# Patient Record
Sex: MALE | Race: WHITE | Employment: UNEMPLOYED | URBAN - METROPOLITAN AREA
[De-identification: names, ages, dates, MRNs, and addresses within clinical notes are randomized per-mention and may not be internally consistent; named-entity substitution may affect disease eponyms.]

---

## 2019-08-23 ENCOUNTER — HOSPITAL ENCOUNTER (INPATIENT)
Age: 54
LOS: 12 days | Discharge: HOME HEALTH | DRG: 854 | End: 2019-09-04
Attending: EMERGENCY MEDICINE | Admitting: HOSPITALIST

## 2019-08-23 ENCOUNTER — APPOINTMENT (OUTPATIENT)
Dept: GENERAL RADIOLOGY | Age: 54
DRG: 854 | End: 2019-08-23
Attending: EMERGENCY MEDICINE

## 2019-08-23 DIAGNOSIS — L03.115 CELLULITIS OF RIGHT FOOT: Primary | ICD-10-CM

## 2019-08-23 DIAGNOSIS — S90.851A FOREIGN BODY IN RIGHT FOOT, INITIAL ENCOUNTER: ICD-10-CM

## 2019-08-23 PROBLEM — Z91.199 NON-COMPLIANCE: Status: ACTIVE | Noted: 2019-08-23

## 2019-08-23 PROBLEM — M79.5 FOREIGN BODY (FB) IN SOFT TISSUE: Status: ACTIVE | Noted: 2019-08-23

## 2019-08-23 PROBLEM — T79.7XXA SUBCUTANEOUS EMPHYSEMA (HCC): Status: ACTIVE | Noted: 2019-08-23

## 2019-08-23 PROBLEM — E87.1 HYPONATREMIA: Status: ACTIVE | Noted: 2019-08-23

## 2019-08-23 LAB
ALBUMIN SERPL-MCNC: 3.4 G/DL (ref 3.5–5)
ALBUMIN/GLOB SERPL: 0.8 {RATIO} (ref 1.2–3.5)
ALP SERPL-CCNC: 129 U/L (ref 50–136)
ALT SERPL-CCNC: 14 U/L (ref 12–65)
ANION GAP SERPL CALC-SCNC: 7 MMOL/L (ref 7–16)
AST SERPL-CCNC: 10 U/L (ref 15–37)
BASOPHILS # BLD: 0.1 K/UL (ref 0–0.2)
BASOPHILS NFR BLD: 0 % (ref 0–2)
BILIRUB SERPL-MCNC: 1.5 MG/DL (ref 0.2–1.1)
BUN SERPL-MCNC: 9 MG/DL (ref 6–23)
CALCIUM SERPL-MCNC: 9.2 MG/DL (ref 8.3–10.4)
CHLORIDE SERPL-SCNC: 90 MMOL/L (ref 98–107)
CO2 SERPL-SCNC: 29 MMOL/L (ref 21–32)
CREAT SERPL-MCNC: 1.21 MG/DL (ref 0.8–1.5)
DIFFERENTIAL METHOD BLD: ABNORMAL
EOSINOPHIL # BLD: 0 K/UL (ref 0–0.8)
EOSINOPHIL NFR BLD: 0 % (ref 0.5–7.8)
ERYTHROCYTE [DISTWIDTH] IN BLOOD BY AUTOMATED COUNT: 12.6 % (ref 11.9–14.6)
EST. AVERAGE GLUCOSE BLD GHB EST-MCNC: 232 MG/DL
GLOBULIN SER CALC-MCNC: 4.4 G/DL (ref 2.3–3.5)
GLUCOSE BLD STRIP.AUTO-MCNC: 252 MG/DL (ref 65–100)
GLUCOSE BLD STRIP.AUTO-MCNC: 315 MG/DL (ref 65–100)
GLUCOSE SERPL-MCNC: 337 MG/DL (ref 65–100)
HBA1C MFR BLD: 9.7 %
HCT VFR BLD AUTO: 42 % (ref 41.1–50.3)
HGB BLD-MCNC: 14.4 G/DL (ref 13.6–17.2)
IMM GRANULOCYTES # BLD AUTO: 0.1 K/UL (ref 0–0.5)
IMM GRANULOCYTES NFR BLD AUTO: 1 % (ref 0–5)
LACTATE BLD-SCNC: 2.02 MMOL/L (ref 0.5–1.9)
LYMPHOCYTES # BLD: 1.2 K/UL (ref 0.5–4.6)
LYMPHOCYTES NFR BLD: 6 % (ref 13–44)
MCH RBC QN AUTO: 28.6 PG (ref 26.1–32.9)
MCHC RBC AUTO-ENTMCNC: 34.3 G/DL (ref 31.4–35)
MCV RBC AUTO: 83.5 FL (ref 79.6–97.8)
MONOCYTES # BLD: 1.2 K/UL (ref 0.1–1.3)
MONOCYTES NFR BLD: 6 % (ref 4–12)
NEUTS SEG # BLD: 17 K/UL (ref 1.7–8.2)
NEUTS SEG NFR BLD: 87 % (ref 43–78)
NRBC # BLD: 0 K/UL (ref 0–0.2)
PLATELET # BLD AUTO: 206 K/UL (ref 150–450)
PMV BLD AUTO: 11 FL (ref 9.4–12.3)
POTASSIUM SERPL-SCNC: 3.7 MMOL/L (ref 3.5–5.1)
PROT SERPL-MCNC: 7.8 G/DL (ref 6.3–8.2)
RBC # BLD AUTO: 5.03 M/UL (ref 4.23–5.6)
SODIUM SERPL-SCNC: 126 MMOL/L (ref 136–145)
URATE SERPL-MCNC: 3.8 MG/DL (ref 2.6–6)
WBC # BLD AUTO: 19.5 K/UL (ref 4.3–11.1)

## 2019-08-23 PROCEDURE — 87150 DNA/RNA AMPLIFIED PROBE: CPT

## 2019-08-23 PROCEDURE — 83605 ASSAY OF LACTIC ACID: CPT

## 2019-08-23 PROCEDURE — 87040 BLOOD CULTURE FOR BACTERIA: CPT

## 2019-08-23 PROCEDURE — 74011250636 HC RX REV CODE- 250/636: Performed by: HOSPITALIST

## 2019-08-23 PROCEDURE — 80053 COMPREHEN METABOLIC PANEL: CPT

## 2019-08-23 PROCEDURE — 87186 SC STD MICRODIL/AGAR DIL: CPT

## 2019-08-23 PROCEDURE — 87205 SMEAR GRAM STAIN: CPT

## 2019-08-23 PROCEDURE — 74011000258 HC RX REV CODE- 258: Performed by: EMERGENCY MEDICINE

## 2019-08-23 PROCEDURE — 99284 EMERGENCY DEPT VISIT MOD MDM: CPT | Performed by: EMERGENCY MEDICINE

## 2019-08-23 PROCEDURE — 87077 CULTURE AEROBIC IDENTIFY: CPT

## 2019-08-23 PROCEDURE — 84550 ASSAY OF BLOOD/URIC ACID: CPT

## 2019-08-23 PROCEDURE — 74011250636 HC RX REV CODE- 250/636: Performed by: EMERGENCY MEDICINE

## 2019-08-23 PROCEDURE — 85025 COMPLETE CBC W/AUTO DIFF WBC: CPT

## 2019-08-23 PROCEDURE — 65270000029 HC RM PRIVATE

## 2019-08-23 PROCEDURE — 83036 HEMOGLOBIN GLYCOSYLATED A1C: CPT

## 2019-08-23 PROCEDURE — 74011636637 HC RX REV CODE- 636/637: Performed by: HOSPITALIST

## 2019-08-23 PROCEDURE — 73630 X-RAY EXAM OF FOOT: CPT

## 2019-08-23 PROCEDURE — 74011250637 HC RX REV CODE- 250/637: Performed by: HOSPITALIST

## 2019-08-23 PROCEDURE — 96365 THER/PROPH/DIAG IV INF INIT: CPT | Performed by: EMERGENCY MEDICINE

## 2019-08-23 PROCEDURE — 82962 GLUCOSE BLOOD TEST: CPT

## 2019-08-23 RX ORDER — ADHESIVE BANDAGE
30 BANDAGE TOPICAL DAILY PRN
Status: DISCONTINUED | OUTPATIENT
Start: 2019-08-23 | End: 2019-09-04 | Stop reason: HOSPADM

## 2019-08-23 RX ORDER — NALOXONE HYDROCHLORIDE 0.4 MG/ML
0.4 INJECTION, SOLUTION INTRAMUSCULAR; INTRAVENOUS; SUBCUTANEOUS AS NEEDED
Status: DISCONTINUED | OUTPATIENT
Start: 2019-08-23 | End: 2019-09-04 | Stop reason: HOSPADM

## 2019-08-23 RX ORDER — PROMETHAZINE HYDROCHLORIDE 25 MG/1
25 TABLET ORAL
Status: DISCONTINUED | OUTPATIENT
Start: 2019-08-23 | End: 2019-09-04 | Stop reason: HOSPADM

## 2019-08-23 RX ORDER — ACETAMINOPHEN 325 MG/1
650 TABLET ORAL
Status: DISCONTINUED | OUTPATIENT
Start: 2019-08-23 | End: 2019-09-04 | Stop reason: HOSPADM

## 2019-08-23 RX ORDER — VANCOMYCIN 2 GRAM/500 ML IN 0.9 % SODIUM CHLORIDE INTRAVENOUS
2000 ONCE
Status: COMPLETED | OUTPATIENT
Start: 2019-08-23 | End: 2019-08-24

## 2019-08-23 RX ORDER — SODIUM CHLORIDE 0.9 % (FLUSH) 0.9 %
5-40 SYRINGE (ML) INJECTION AS NEEDED
Status: DISCONTINUED | OUTPATIENT
Start: 2019-08-23 | End: 2019-09-03 | Stop reason: SDUPTHER

## 2019-08-23 RX ORDER — ACETAMINOPHEN 650 MG/1
650 SUPPOSITORY RECTAL
Status: DISCONTINUED | OUTPATIENT
Start: 2019-08-23 | End: 2019-08-23

## 2019-08-23 RX ORDER — SODIUM CHLORIDE 9 MG/ML
100 INJECTION, SOLUTION INTRAVENOUS CONTINUOUS
Status: DISPENSED | OUTPATIENT
Start: 2019-08-23 | End: 2019-08-25

## 2019-08-23 RX ORDER — INSULIN LISPRO 100 [IU]/ML
INJECTION, SOLUTION INTRAVENOUS; SUBCUTANEOUS
Status: DISCONTINUED | OUTPATIENT
Start: 2019-08-23 | End: 2019-09-04 | Stop reason: HOSPADM

## 2019-08-23 RX ORDER — SODIUM CHLORIDE 0.9 % (FLUSH) 0.9 %
5-40 SYRINGE (ML) INJECTION EVERY 8 HOURS
Status: DISCONTINUED | OUTPATIENT
Start: 2019-08-23 | End: 2019-09-03 | Stop reason: SDUPTHER

## 2019-08-23 RX ORDER — MORPHINE SULFATE 2 MG/ML
2 INJECTION, SOLUTION INTRAMUSCULAR; INTRAVENOUS
Status: DISCONTINUED | OUTPATIENT
Start: 2019-08-23 | End: 2019-09-04 | Stop reason: HOSPADM

## 2019-08-23 RX ORDER — DIPHENHYDRAMINE HCL 25 MG
25 CAPSULE ORAL
Status: DISCONTINUED | OUTPATIENT
Start: 2019-08-23 | End: 2019-09-04 | Stop reason: HOSPADM

## 2019-08-23 RX ORDER — VANCOMYCIN/0.9 % SOD CHLORIDE 1.5G/250ML
1500 PLASTIC BAG, INJECTION (ML) INTRAVENOUS EVERY 24 HOURS
Status: DISCONTINUED | OUTPATIENT
Start: 2019-08-23 | End: 2019-08-23 | Stop reason: SDUPTHER

## 2019-08-23 RX ORDER — ENOXAPARIN SODIUM 100 MG/ML
40 INJECTION SUBCUTANEOUS EVERY 24 HOURS
Status: DISCONTINUED | OUTPATIENT
Start: 2019-08-24 | End: 2019-09-04 | Stop reason: HOSPADM

## 2019-08-23 RX ORDER — IBUPROFEN 200 MG
1 TABLET ORAL
Status: DISCONTINUED | OUTPATIENT
Start: 2019-08-23 | End: 2019-09-04 | Stop reason: HOSPADM

## 2019-08-23 RX ORDER — HYDROCODONE BITARTRATE AND ACETAMINOPHEN 7.5; 325 MG/1; MG/1
1 TABLET ORAL
Status: DISCONTINUED | OUTPATIENT
Start: 2019-08-23 | End: 2019-09-04 | Stop reason: HOSPADM

## 2019-08-23 RX ORDER — VANCOMYCIN/0.9 % SOD CHLORIDE 1.5G/250ML
1500 PLASTIC BAG, INJECTION (ML) INTRAVENOUS EVERY 12 HOURS
Status: DISCONTINUED | OUTPATIENT
Start: 2019-08-24 | End: 2019-08-25 | Stop reason: DRUGHIGH

## 2019-08-23 RX ADMIN — INSULIN LISPRO 5 UNITS: 100 INJECTION, SOLUTION INTRAVENOUS; SUBCUTANEOUS at 22:56

## 2019-08-23 RX ADMIN — SODIUM CHLORIDE 75 ML/HR: 900 INJECTION, SOLUTION INTRAVENOUS at 22:21

## 2019-08-23 RX ADMIN — SODIUM CHLORIDE 1000 ML: 900 INJECTION, SOLUTION INTRAVENOUS at 19:57

## 2019-08-23 RX ADMIN — VANCOMYCIN HYDROCHLORIDE 2000 MG: 10 INJECTION, POWDER, LYOPHILIZED, FOR SOLUTION INTRAVENOUS at 22:22

## 2019-08-23 RX ADMIN — PIPERACILLIN SODIUM,TAZOBACTAM SODIUM 3.38 G: 3; .375 INJECTION, POWDER, FOR SOLUTION INTRAVENOUS at 18:41

## 2019-08-23 RX ADMIN — Medication 5 ML: at 22:23

## 2019-08-23 RX ADMIN — ACETAMINOPHEN 650 MG: 325 TABLET, FILM COATED ORAL at 22:56

## 2019-08-23 NOTE — ED NOTES
Cleaned right foot with dermal cleanser. Pt tolerated well. Antibiotic hanging. Call bell provided. Pt resting.

## 2019-08-23 NOTE — ED TRIAGE NOTES
Pt to ED c/o redness/swelling/possible gout flare of right foot. Last flare in 2002. Pt states he cannot afford his diabetes medications. bgl 315. Afebrile.

## 2019-08-23 NOTE — ED PROVIDER NOTES
60-year-old  male with a history of diabetes presents to the emergency department complaining of right foot pain and swelling    The history is provided by the patient. Foot Pain    This is a new problem. The current episode started more than 2 days ago. The problem occurs constantly. The problem has been gradually worsening. The pain is present in the right foot. The quality of the pain is described as aching. The pain is at a severity of 6/10. Associated symptoms include stiffness. Pertinent negatives include no numbness, full range of motion, no tingling, no itching, no back pain and no neck pain. The symptoms are aggravated by palpation, movement and standing. He has tried rest for the symptoms. The treatment provided no relief. There has been no history of extremity trauma. Past Medical History:   Diagnosis Date    Diabetes [facility])        Past Surgical History:   Procedure Laterality Date    HX CHOLECYSTECTOMY           History reviewed. No pertinent family history.     Social History     Socioeconomic History    Marital status: SINGLE     Spouse name: Not on file    Number of children: Not on file    Years of education: Not on file    Highest education level: Not on file   Occupational History    Not on file   Social Needs    Financial resource strain: Not on file    Food insecurity:     Worry: Not on file     Inability: Not on file    Transportation needs:     Medical: Not on file     Non-medical: Not on file   Tobacco Use    Smoking status: Current Every Day Smoker   Substance and Sexual Activity    Alcohol use: No    Drug use: Not on file    Sexual activity: Never   Lifestyle    Physical activity:     Days per week: Not on file     Minutes per session: Not on file    Stress: Not on file   Relationships    Social connections:     Talks on phone: Not on file     Gets together: Not on file     Attends Advent service: Not on file     Active member of club or organization: Not on file     Attends meetings of clubs or organizations: Not on file     Relationship status: Not on file    Intimate partner violence:     Fear of current or ex partner: Not on file     Emotionally abused: Not on file     Physically abused: Not on file     Forced sexual activity: Not on file   Other Topics Concern    Not on file   Social History Narrative    Not on file         ALLERGIES: Patient has no known allergies. Review of Systems   Constitutional: Negative for chills and fever. Musculoskeletal: Positive for arthralgias and stiffness. Negative for back pain and neck pain. Skin: Positive for color change. Negative for itching. Neurological: Negative for tingling and numbness. All other systems reviewed and are negative. Vitals:    08/23/19 1738   BP: 123/85   Pulse: 94   Resp: 16   Temp: 98.7 °F (37.1 °C)   SpO2: 98%   Weight: 95.3 kg (210 lb)   Height: 5' 9\" (1.753 m)            Physical Exam   Constitutional: He is oriented to person, place, and time. He appears well-developed and well-nourished. No distress. HENT:   Head: Normocephalic and atraumatic. Right Ear: External ear normal.   Left Ear: External ear normal.   Mouth/Throat: Oropharynx is clear and moist.   Eyes: Pupils are equal, round, and reactive to light. Conjunctivae and EOM are normal.   Neck: Normal range of motion. Neck supple. Cardiovascular: Normal rate, regular rhythm, normal heart sounds and intact distal pulses. Exam reveals no gallop and no friction rub. No murmur heard. Pulmonary/Chest: Effort normal and breath sounds normal.   Abdominal: Soft. Bowel sounds are normal. There is no tenderness. Musculoskeletal: Normal range of motion. He exhibits no edema. Right foot: There is tenderness, swelling and laceration. There is no deformity. Feet:    Right foot is swollen and erythematous and warm with streaking erythema going up the anterior aspect of the leg. 3+ edema to the right ankle and foot. Feet:   Right Foot:   Skin Integrity: Positive for erythema and warmth. Neurological: He is alert and oriented to person, place, and time. Skin: Skin is warm and dry. Capillary refill takes less than 2 seconds. There is erythema. Psychiatric: He has a normal mood and affect. Nursing note and vitals reviewed. MDM  Number of Diagnoses or Management Options  Cellulitis of right foot: new and requires workup  Foreign body in right foot, initial encounter: new and requires workup     Amount and/or Complexity of Data Reviewed  Clinical lab tests: ordered and reviewed  Tests in the radiology section of CPT®: ordered and reviewed  Review and summarize past medical records: yes  Discuss the patient with other providers: yes  Independent visualization of images, tracings, or specimens: yes    Risk of Complications, Morbidity, and/or Mortality  Presenting problems: high  Diagnostic procedures: moderate  Management options: moderate    Patient Progress  Patient progress: stable         Procedures    The patient was observed in the ED. he was given Zosyn 4.5 g IV for his foot infection. Due to the elevated blood sugars, history of noncompliance, severe swelling as well as possible foreign body in the foot, case was discussed with the hospitalist will admit.     Results Reviewed:      Recent Results (from the past 24 hour(s))   GLUCOSE, POC    Collection Time: 08/23/19  5:41 PM   Result Value Ref Range    Glucose (POC) 315 (H) 65 - 100 mg/dL   CBC WITH AUTOMATED DIFF    Collection Time: 08/23/19  5:54 PM   Result Value Ref Range    WBC 19.5 (H) 4.3 - 11.1 K/uL    RBC 5.03 4.23 - 5.6 M/uL    HGB 14.4 13.6 - 17.2 g/dL    HCT 42.0 41.1 - 50.3 %    MCV 83.5 79.6 - 97.8 FL    MCH 28.6 26.1 - 32.9 PG    MCHC 34.3 31.4 - 35.0 g/dL    RDW 12.6 11.9 - 14.6 %    PLATELET 463 490 - 993 K/uL    MPV 11.0 9.4 - 12.3 FL    ABSOLUTE NRBC 0.00 0.0 - 0.2 K/uL    DF AUTOMATED      NEUTROPHILS 87 (H) 43 - 78 %    LYMPHOCYTES 6 (L) 13 - 44 %    MONOCYTES 6 4.0 - 12.0 %    EOSINOPHILS 0 (L) 0.5 - 7.8 %    BASOPHILS 0 0.0 - 2.0 %    IMMATURE GRANULOCYTES 1 0.0 - 5.0 %    ABS. NEUTROPHILS 17.0 (H) 1.7 - 8.2 K/UL    ABS. LYMPHOCYTES 1.2 0.5 - 4.6 K/UL    ABS. MONOCYTES 1.2 0.1 - 1.3 K/UL    ABS. EOSINOPHILS 0.0 0.0 - 0.8 K/UL    ABS. BASOPHILS 0.1 0.0 - 0.2 K/UL    ABS. IMM. GRANS. 0.1 0.0 - 0.5 K/UL   METABOLIC PANEL, COMPREHENSIVE    Collection Time: 08/23/19  5:54 PM   Result Value Ref Range    Sodium 126 (L) 136 - 145 mmol/L    Potassium 3.7 3.5 - 5.1 mmol/L    Chloride 90 (L) 98 - 107 mmol/L    CO2 29 21 - 32 mmol/L    Anion gap 7 7 - 16 mmol/L    Glucose 337 (H) 65 - 100 mg/dL    BUN 9 6 - 23 MG/DL    Creatinine 1.21 0.8 - 1.5 MG/DL    GFR est AA >60 >60 ml/min/1.73m2    GFR est non-AA >60 >60 ml/min/1.73m2    Calcium 9.2 8.3 - 10.4 MG/DL    Bilirubin, total 1.5 (H) 0.2 - 1.1 MG/DL    ALT (SGPT) 14 12 - 65 U/L    AST (SGOT) 10 (L) 15 - 37 U/L    Alk. phosphatase 129 50 - 136 U/L    Protein, total 7.8 6.3 - 8.2 g/dL    Albumin 3.4 (L) 3.5 - 5.0 g/dL    Globulin 4.4 (H) 2.3 - 3.5 g/dL    A-G Ratio 0.8 (L) 1.2 - 3.5     URIC ACID    Collection Time: 08/23/19  5:54 PM   Result Value Ref Range    Uric acid 3.8 2.6 - 6.0 MG/DL   POC LACTIC ACID    Collection Time: 08/23/19  6:40 PM   Result Value Ref Range    Lactic Acid (POC) 2.02 (H) 0.5 - 1.9 mmol/L       XR FOOT RT MIN 3 V   Final Result   IMPRESSION: Findings suggestive of triangular radiopaque foreign body within the   plantar soft tissues at the level of the third proximal phalanx. There is also   subcutaneous emphysema seen extending from the first through fifth digits at the   level of the proximal phalanges.

## 2019-08-23 NOTE — PROGRESS NOTES
TRANSFER - IN REPORT:    Verbal report received from Janis Veras RN on Shama Stage  being received from [facility]. for routine progression of care      Report consisted of patients Situation, Background, Assessment and   Recommendations(SBAR). Information from the following report(s) Kardex, ED Summary and Recent Results was reviewed with the receiving nurse. Opportunity for questions and clarification was provided. Assessment to be completed upon patients arrival to unit and care to be assumed.

## 2019-08-24 ENCOUNTER — ANESTHESIA EVENT (OUTPATIENT)
Dept: SURGERY | Age: 54
DRG: 854 | End: 2019-08-24

## 2019-08-24 PROBLEM — A41.9 SEPSIS (HCC): Status: ACTIVE | Noted: 2019-08-24

## 2019-08-24 PROBLEM — E11.628 DIABETIC INFECTION OF RIGHT FOOT (HCC): Status: ACTIVE | Noted: 2019-08-24

## 2019-08-24 PROBLEM — L08.9 DIABETIC INFECTION OF RIGHT FOOT (HCC): Status: ACTIVE | Noted: 2019-08-24

## 2019-08-24 LAB
ANION GAP SERPL CALC-SCNC: 7 MMOL/L (ref 7–16)
BUN SERPL-MCNC: 10 MG/DL (ref 6–23)
CALCIUM SERPL-MCNC: 7.8 MG/DL (ref 8.3–10.4)
CHLORIDE SERPL-SCNC: 96 MMOL/L (ref 98–107)
CO2 SERPL-SCNC: 28 MMOL/L (ref 21–32)
CREAT SERPL-MCNC: 1.2 MG/DL (ref 0.8–1.5)
CRP SERPL HS-MCNC: 189 MG/L
ERYTHROCYTE [DISTWIDTH] IN BLOOD BY AUTOMATED COUNT: 12.4 % (ref 11.9–14.6)
ERYTHROCYTE [SEDIMENTATION RATE] IN BLOOD: 51 MM/HR (ref 0–20)
GLUCOSE BLD STRIP.AUTO-MCNC: 156 MG/DL (ref 65–100)
GLUCOSE BLD STRIP.AUTO-MCNC: 177 MG/DL (ref 65–100)
GLUCOSE BLD STRIP.AUTO-MCNC: 182 MG/DL (ref 65–100)
GLUCOSE BLD STRIP.AUTO-MCNC: 235 MG/DL (ref 65–100)
GLUCOSE SERPL-MCNC: 174 MG/DL (ref 65–100)
HCT VFR BLD AUTO: 37.3 % (ref 41.1–50.3)
HGB BLD-MCNC: 12.7 G/DL (ref 13.6–17.2)
MCH RBC QN AUTO: 28.3 PG (ref 26.1–32.9)
MCHC RBC AUTO-ENTMCNC: 34 G/DL (ref 31.4–35)
MCV RBC AUTO: 83.3 FL (ref 79.6–97.8)
NRBC # BLD: 0 K/UL (ref 0–0.2)
PLATELET # BLD AUTO: 161 K/UL (ref 150–450)
PMV BLD AUTO: 10.5 FL (ref 9.4–12.3)
POTASSIUM SERPL-SCNC: 3.6 MMOL/L (ref 3.5–5.1)
RBC # BLD AUTO: 4.48 M/UL (ref 4.23–5.6)
SODIUM SERPL-SCNC: 131 MMOL/L (ref 136–145)
WBC # BLD AUTO: 14.2 K/UL (ref 4.3–11.1)

## 2019-08-24 PROCEDURE — 85652 RBC SED RATE AUTOMATED: CPT

## 2019-08-24 PROCEDURE — 65270000029 HC RM PRIVATE

## 2019-08-24 PROCEDURE — 85027 COMPLETE CBC AUTOMATED: CPT

## 2019-08-24 PROCEDURE — 74011000258 HC RX REV CODE- 258: Performed by: HOSPITALIST

## 2019-08-24 PROCEDURE — 74011250636 HC RX REV CODE- 250/636: Performed by: HOSPITALIST

## 2019-08-24 PROCEDURE — 36415 COLL VENOUS BLD VENIPUNCTURE: CPT

## 2019-08-24 PROCEDURE — 77030020263 HC SOL INJ SOD CL0.9% LFCR 1000ML

## 2019-08-24 PROCEDURE — 82962 GLUCOSE BLOOD TEST: CPT

## 2019-08-24 PROCEDURE — 74011250637 HC RX REV CODE- 250/637: Performed by: HOSPITALIST

## 2019-08-24 PROCEDURE — 80048 BASIC METABOLIC PNL TOTAL CA: CPT

## 2019-08-24 PROCEDURE — 74011636637 HC RX REV CODE- 636/637: Performed by: INTERNAL MEDICINE

## 2019-08-24 PROCEDURE — 86141 C-REACTIVE PROTEIN HS: CPT

## 2019-08-24 PROCEDURE — 74011636637 HC RX REV CODE- 636/637: Performed by: HOSPITALIST

## 2019-08-24 RX ORDER — POVIDONE-IODINE 10 %
SOLUTION, NON-ORAL TOPICAL DAILY
Status: DISCONTINUED | OUTPATIENT
Start: 2019-08-24 | End: 2019-08-30

## 2019-08-24 RX ORDER — INSULIN GLARGINE 100 [IU]/ML
10 INJECTION, SOLUTION SUBCUTANEOUS DAILY
Status: DISCONTINUED | OUTPATIENT
Start: 2019-08-24 | End: 2019-09-04 | Stop reason: HOSPADM

## 2019-08-24 RX ADMIN — INSULIN GLARGINE 10 UNITS: 100 INJECTION, SOLUTION SUBCUTANEOUS at 08:37

## 2019-08-24 RX ADMIN — Medication 10 ML: at 22:30

## 2019-08-24 RX ADMIN — INSULIN LISPRO 3 UNITS: 100 INJECTION, SOLUTION INTRAVENOUS; SUBCUTANEOUS at 08:38

## 2019-08-24 RX ADMIN — INSULIN LISPRO 2 UNITS: 100 INJECTION, SOLUTION INTRAVENOUS; SUBCUTANEOUS at 02:00

## 2019-08-24 RX ADMIN — ENOXAPARIN SODIUM 40 MG: 40 INJECTION SUBCUTANEOUS at 08:39

## 2019-08-24 RX ADMIN — Medication 10 ML: at 06:35

## 2019-08-24 RX ADMIN — PIPERACILLIN SODIUM,TAZOBACTAM SODIUM 3.38 G: 3; .375 INJECTION, POWDER, FOR SOLUTION INTRAVENOUS at 19:43

## 2019-08-24 RX ADMIN — SODIUM CHLORIDE 75 ML/HR: 900 INJECTION, SOLUTION INTRAVENOUS at 15:29

## 2019-08-24 RX ADMIN — INSULIN LISPRO 5 UNITS: 100 INJECTION, SOLUTION INTRAVENOUS; SUBCUTANEOUS at 12:33

## 2019-08-24 RX ADMIN — INSULIN LISPRO 3 UNITS: 100 INJECTION, SOLUTION INTRAVENOUS; SUBCUTANEOUS at 22:19

## 2019-08-24 RX ADMIN — ACETAMINOPHEN 650 MG: 325 TABLET, FILM COATED ORAL at 23:58

## 2019-08-24 RX ADMIN — VANCOMYCIN HYDROCHLORIDE 1500 MG: 10 INJECTION, POWDER, LYOPHILIZED, FOR SOLUTION INTRAVENOUS at 10:11

## 2019-08-24 RX ADMIN — PIPERACILLIN SODIUM,TAZOBACTAM SODIUM 3.38 G: 3; .375 INJECTION, POWDER, FOR SOLUTION INTRAVENOUS at 11:27

## 2019-08-24 RX ADMIN — Medication: at 19:50

## 2019-08-24 RX ADMIN — PIPERACILLIN SODIUM,TAZOBACTAM SODIUM 3.38 G: 3; .375 INJECTION, POWDER, FOR SOLUTION INTRAVENOUS at 02:59

## 2019-08-24 RX ADMIN — VANCOMYCIN HYDROCHLORIDE 1500 MG: 10 INJECTION, POWDER, LYOPHILIZED, FOR SOLUTION INTRAVENOUS at 22:01

## 2019-08-24 NOTE — CONSULTS
H&P/Consult Note/Progress Note/Office Note:   Kelly Hamilton  MRN: [id number]  :1965  Age:54 y.o.    HPI: Kelly Hamilton is a 49 y.o. male who was admitted on 19 from the ER by the Hospitalists with a diabetic right foot infection. He reported progressive, constant, severe right foot pain for at least 2 days. He walks barefoot. Nothing made pain better. It is worse with palpation or standing. He had associated low grade fever. He was placed on IV Abx by Hospitalists  He has h/o DM and non- compliance (No primary care doctor, not on DM meds)  Bld Cxs x 2 negative so far  Xray as below      19 Xray right foot  History: Redness and swelling of the right foot. Pain     EXAM: 3 views right foot     FINDINGS: There is a question of a triangular shaped foreign body along the  plantar surface of the right foot at the level of the third proximal phalanx. There is subcutaneous emphysema, raising possibility for superimposed infection. No fracture or dislocation.     IMPRESSION: Findings suggestive of triangular radiopaque foreign body within the  plantar soft tissues at the level of the third proximal phalanx.  There is also  subcutaneous emphysema seen extending from the first through fifth digits at the  level of the proximal phalanges            Past Medical History:   Diagnosis Date    Diabetes [facility])      Past Surgical History:   Procedure Laterality Date    HX CHOLECYSTECTOMY       Current Facility-Administered Medications   Medication Dose Route Frequency    piperacillin-tazobactam (ZOSYN) 3.375 g in 0.9% sodium chloride (MBP/ADV) 100 mL  3.375 g IntraVENous Q8H    insulin glargine (LANTUS) injection 10 Units  10 Units SubCUTAneous DAILY    [START ON 2019] VANCOMYCIN INFORMATION NOTE   Other ONCE    povidone-iodine (BETADINE) 10 % topical solution   Topical DAILY    0.9% sodium chloride infusion  100 mL/hr IntraVENous CONTINUOUS    sodium chloride (NS) flush 5-40 mL  5-40 mL IntraVENous Q8H    sodium chloride (NS) flush 5-40 mL  5-40 mL IntraVENous PRN    HYDROcodone-acetaminophen (NORCO) 7.5-325 mg per tablet 1 Tab  1 Tab Oral Q4H PRN    morphine injection 2 mg  2 mg IntraVENous Q4H PRN    naloxone (NARCAN) injection 0.4 mg  0.4 mg IntraVENous PRN    diphenhydrAMINE (BENADRYL) capsule 25 mg  25 mg Oral Q4H PRN    promethazine (PHENERGAN) tablet 25 mg  25 mg Oral Q6H PRN    magnesium hydroxide (MILK OF MAGNESIA) 400 mg/5 mL oral suspension 30 mL  30 mL Oral DAILY PRN    nicotine (NICODERM CQ) 21 mg/24 hr patch 1 Patch  1 Patch TransDERmal DAILY PRN    enoxaparin (LOVENOX) injection 40 mg  40 mg SubCUTAneous Q24H    insulin lispro (HUMALOG) injection   SubCUTAneous AC&HS    acetaminophen (TYLENOL) tablet 650 mg  650 mg Oral Q4H PRN    vancomycin (VANCOCIN) 1500 mg in  ml infusion  1,500 mg IntraVENous Q12H     Patient has no known allergies. Social History     Socioeconomic History    Marital status: SINGLE     Spouse name: Not on file    Number of children: Not on file    Years of education: Not on file    Highest education level: Not on file   Tobacco Use    Smoking status: Current Every Day Smoker   Substance and Sexual Activity    Alcohol use: No    Sexual activity: Never     Social History     Tobacco Use   Smoking Status Current Every Day Smoker     History reviewed. No pertinent family history. ROS: The patient has no difficulty with chest pain or shortness of breath. No fever or chills. Comprehensive review of systems was otherwise unremarkable except as noted above.     Physical Exam:   Visit Vitals  /79 (BP 1 Location: Left arm, BP Patient Position: At rest)   Pulse 82   Temp 100.1 °F (37.8 °C)   Resp 16   Ht 5' 9\" (1.753 m)   Wt 210 lb (95.3 kg)   SpO2 98%   BMI 31.01 kg/m²     Vitals:    08/24/19 0317 08/24/19 0745 08/24/19 1218 08/24/19 1557   BP: 154/81 141/78 126/73 149/79   Pulse: 89 86 87 82   Resp: 17 16 16 16   Temp: 98.6 °F (37 °C) 100.3 °F (37.9 °C) 99.4 °F (37.4 °C) 100.1 °F (37.8 °C)   SpO2: 97% 94% 97% 98%   Weight:       Height:         No intake/output data recorded. 08/23 0701 - 08/24 1900  In: 0006 [P.O.:240; I.V.:850]  Out: 1250 [Urine:1250]    Constitutional: Alert, oriented, cooperative patient in no acute distress; appears stated age    Eyes:Sclera are clear. EOMs intact  ENMT: no external lesions gross hearing normal; no obvious neck masses, no ear or lip lesions, nares normal  CV: RRR. Normal perfusion  Resp: No JVD. Breathing is  non-labored; no audible wheezing. GI: soft and non-distended     Musculoskeletal: unremarkable with normal function. No embolic signs or cyanosis. Right foot cellulitis with much necrotic tissue on plantar aspect of distal foot just proxmial to toes overlying distal metatarsals   No fluctuance;  No crepitus    Neuro:  Oriented; moves all 4; no focal deficits  Psychiatric: normal affect and mood, no memory impairment    Recent vitals (if inpt):  Patient Vitals for the past 24 hrs:   BP Temp Pulse Resp SpO2   08/24/19 1557 149/79 100.1 °F (37.8 °C) 82 16 98 %   08/24/19 1218 126/73 99.4 °F (37.4 °C) 87 16 97 %   08/24/19 0745 141/78 100.3 °F (37.9 °C) 86 16 94 %   08/24/19 0317 154/81 98.6 °F (37 °C) 89 17 97 %   08/23/19 2334 128/76 97.9 °F (36.6 °C) 87 16 97 %   08/23/19 2012 147/75 97.4 °F (36.3 °C) 84 17 99 %   08/23/19 1945 131/84 98.5 °F (36.9 °C) 88 16 98 %       Labs:  Recent Labs     08/24/19  0454 08/23/19  1754   WBC 14.2* 19.5*   HGB 12.7* 14.4    206   * 126*   K 3.6 3.7   CL 96* 90*   CO2 28 29   BUN 10 9   CREA 1.20 1.21   * 337*   TBILI  --  1.5*   SGOT  --  10*   ALT  --  14   AP  --  129       Lab Results   Component Value Date/Time    WBC 14.2 (H) 08/24/2019 04:54 AM    HGB 12.7 (L) 08/24/2019 04:54 AM    PLATELET 664 82/26/8485 04:54 AM    Sodium 131 (L) 08/24/2019 04:54 AM    Potassium 3.6 08/24/2019 04:54 AM    Chloride 96 (L) 08/24/2019 04:54 AM    CO2 28 08/24/2019 04:54 AM    BUN 10 08/24/2019 04:54 AM    Creatinine 1.20 08/24/2019 04:54 AM    Glucose 174 (H) 08/24/2019 04:54 AM    Bilirubin, total 1.5 (H) 08/23/2019 05:54 PM    AST (SGOT) 10 (L) 08/23/2019 05:54 PM    ALT (SGPT) 14 08/23/2019 05:54 PM    Alk. phosphatase 129 08/23/2019 05:54 PM       CT Results  (Last 48 hours)    None        chest X-ray      I reviewed recent labs, recent radiologic studies, and pertinent records including other doctor notes if needed. I independently reviewed radiology images for studies I described above or studies I have ordered. Admission date (for inpatients): 8/23/2019   * No surgery found *  * No surgery found *    ASSESSMENT/PLAN:  Problem List  Date Reviewed: 8/24/2019          Codes Class Noted    * (Principal) Diabetic infection of right foot ([facility] ICD-10-CM: E11.628, L08.9  ICD-9-CM: 250.80, 686.9  8/24/2019        Sepsis ([facility] ICD-10-CM: A41.9  ICD-9-CM: 038.9, 995.91  8/24/2019        Cellulitis of right leg ICD-10-CM: L03.115  ICD-9-CM: 682.6  8/23/2019        Foreign body (FB) in soft tissue ICD-10-CM: M79.5  ICD-9-CM: 729.6  8/23/2019        Subcutaneous emphysema ([facility] ICD-10-CM: T79. 7XXA  ICD-9-CM: 958.7  8/23/2019        Uncontrolled type II diabetes mellitus ([facility] ICD-10-CM: E11.65  ICD-9-CM: 250.02  8/23/2019        Personal history of noncompliance with medical treatment ICD-10-CM: Z91.19  ICD-9-CM: V15.81  8/23/2019        Hyponatremia ICD-10-CM: E87.1  ICD-9-CM: 276.1  8/23/2019            Principal Problem:    Diabetic infection of right foot ([facility] (8/24/2019)    Active Problems:    Cellulitis of right leg (8/23/2019)      Foreign body (FB) in soft tissue (8/23/2019)      Subcutaneous emphysema ([address] (8/23/2019)      Uncontrolled type II diabetes mellitus ([address] (8/23/2019)      Personal history of noncompliance with medical treatment (8/23/2019)      Hyponatremia (8/23/2019)      Sepsis ([address] (8/24/2019)         Diabetic right foot infection  Will likely proceed with OR debridement, drainage,a dn foreign body removal in OR in am, but will decide in early am  NPO after midnight  IVF  IV Abx; Pharmacy dosing Vancomycin    Surgical drainage/debridement and FB removal recommended   Procedural risks discussed including inability to locate the FB, need for furtehr procedures, blood clots, progressive infection despite suregry, need for amputation. All questions answered. Pt elects to proceed      Uncontrolled DM  Inpt diabetic control  Sliding scale and semiquants    Non-Compliance with outpt DM regimen  Encourage compliance  Risks of non-compliance discussed including amputation and early death            I have personally performed a face-to-face diagnostic evaluation and management  service on this patient. I have independently seen the patient. I have independently obtained the above history from the patient/family. I have independently examined the patient with above findings. I have independently reviewed data/labs for this patient and developed the above plan of care (MDM). Signed: Luz Elena Backers.  Lidia Tompkins MD, FACS

## 2019-08-24 NOTE — PROGRESS NOTES
Resting quietly, awake with no c/o during bedside report given to Eric Maxwell RN. Breakfast menu given with instructions how to order. No distress noted.

## 2019-08-24 NOTE — PROGRESS NOTES
Pt with no new complaint. Denies pain/numbness to that right foot, although flinches with dressing change. Drainage is cloudy and malodorous. Pt assisted in hygiene overnight. Education reinforced this morning ie: foot care and wearing shoes. Consult to diabetes educator.

## 2019-08-24 NOTE — PROGRESS NOTES
Hospitalist Progress Note    2019  Admit Date: 2019  6:07 PM   NAME: Wanda Ludwig   :  1965   MRN:  [id number]   Attending: Messi Nam MD  PCP:  None    SUBJECTIVE:   Patient is a 49years old male with history of NDDM and non complicance presents with redness, swelling, pain of R foot and low grade fever for last 5 days. He do not recall injuries but he walks bare foot not infrequently. He has no PCP, on no medications for years despite his need for low dose insulin 2-3 y ago. Interval History(): patient examined at bedside. No acute events since admission, nursing staff says that his right foot has been bleeding and is foul smelling. Patient says he does not have a PCP nor does he take any medications because he cannot afford anything. He denies fevers/chills, chest pain, shortness of breath. Pain in foot is tolerable currently. Review of Systems negative with exception of pertinent positives noted above  PHYSICAL EXAM     Visit Vitals  /78 (BP 1 Location: Right arm, BP Patient Position: At rest)   Pulse 86   Temp 100.3 °F (37.9 °C)   Resp 16   Ht 5' 9\" (1.753 m)   Wt 95.3 kg (210 lb)   SpO2 94%   BMI 31.01 kg/m²      Temp (24hrs), Av.6 °F (37 °C), Min:97.4 °F (36.3 °C), Max:100.3 °F (37.9 °C)    Oxygen Therapy  O2 Sat (%): 94 % (19 0745)  O2 Device: Room air (19 0724)    Intake/Output Summary (Last 24 hours) at 2019 1146  Last data filed at 2019 1016  Gross per 24 hour   Intake 840 ml   Output 1050 ml   Net -210 ml      General: No acute distress    Lungs:  CTA Bilaterally. Heart:  Regular rate and rhythm,  No murmur, rub, or gallop  Abdomen: Soft, Non distended, Non tender, Positive bowel sounds  Extremities: Bleeding on plantar surface of right foot between 2nd/3rd toes with surrounding erythema. No palpable crepitus.    Neurologic:  No focal deficits    ASSESSMENT      Active Hospital Problems    Diagnosis Date Noted    Diabetic infection of right foot ([address] 08/24/2019    Sepsis ([address] 08/24/2019    Cellulitis of right leg 08/23/2019    Foreign body (FB) in soft tissue 08/23/2019    Subcutaneous emphysema ([facility] 08/23/2019    Uncontrolled type II diabetes mellitus ([facility] 08/23/2019    Personal history of noncompliance with medical treatment 08/23/2019    Hyponatremia 08/23/2019     Plan:    # Sepsis with diabetic foot infection of the right foot  - started empirically on vancomycin and Zosyn  - x-ray upon admission showing foreign body on plantar surface (patient admits to walking around barefoot)  - consult general surgery for removal of foreign body  - given uncontrolled DM type I, would like to order MRI of right foot but do not know if he has metal in his foot  - continue with vancomycin/Zosyn and IVFs  - blood cultures x2 collected with NGTD    # DM type II, uncontrolled  - cannot afford any medications and does not take anything  - case management consult for assistance   - Lantus 10 units qdaily  - Humalog SSI and serial CBGs  - goal CBG between 140 and 180 while inpatient  - diabetes education consultation  - repeat HgbA1c of 9.7, patient would most benefit with starting on insulin regimen after hospitalization    # Hyponatremia  - resolving with IVFs  - continue to monitor    F/E/N: maintenance fluids, replete electrolytes as needed, NPO for possible surgical intervention    Ppx: Lovenox for VTE    Code Status: FULL CODE    Disposition: pending workup as above. Discussed with patient at bedside. All questions answered.      Signed By: Marquise Godoy DO     August 24, 2019

## 2019-08-24 NOTE — PROGRESS NOTES
Vancomycin Consult    MD ordering: Andrew MEZA following? no  Indication: SSTI  DOT:  5-7  days  Goal level(s): 10 - 20    Ht Readings from Last 1 Encounters:   19 5' 9\" (1.753 m)      Wt Readings from Last 1 Encounters:   19 95.3 kg (210 lb)         Temp (24hrs), Av.2 °F (36.8 °C), Min:97.4 °F (36.3 °C), Max:98.7 °F (37.1 °C)    Dosing weight: 95 kg  49 y.o. Date:  Dose/Freq Admin Times Level/Time:    2 g IV LD  1500 mg IV every 12 hours (10)  ()     1500 mg IV every 12 hours (10)  () Trough (09)                 Recent Labs     19  0454 19  1840 19  1754   BUN 10  --  9   CREA 1.20  --  1.21   WBC 14.2*  --  19.5*   LACPOC  --  2.02*  --      Estimated Creatinine Clearance: 81.1 mL/min (based on SCr of 1.2 mg/dL). Day 2 Assessment and Plan: Will continue with Vancomycin 1500 mg IV every 12 hours. Vancomycin trough ordered for 9 am tomorrow .     Sole WinnD, Board Certified Pharmacotherapy Specialist

## 2019-08-24 NOTE — PROGRESS NOTES
08/24/19 0310   Dual Skin Pressure Injury Assessment   Dual Skin Pressure Injury Assessment WDL   Second Care Provider (Based on Facility Policy) Ambrosio Gaytan RN   Skin Integumentary   Skin Integumentary (WDL) X   Skin Color Purple;Red  (right foot, toes)   Skin Condition/Temp Dry; Inflamed; Warm   Skin Integrity Cracked  (between toes, 2nd  through 5th, scattered scabs over body)

## 2019-08-24 NOTE — ANESTHESIA PREPROCEDURE EVALUATION
Relevant Problems   No relevant active problems       Anesthetic History   No history of anesthetic complications            Review of Systems / Medical History  Patient summary reviewed and pertinent labs reviewed    Pulmonary          Smoker         Neuro/Psych   Within defined limits           Cardiovascular                  Exercise tolerance: >4 METS     GI/Hepatic/Renal                Endo/Other    Diabetes: well controlled, type 2    Obesity     Other Findings   Comments: Foreign body right foot; infection           Physical Exam    Airway  Mallampati: II  TM Distance: 4 - 6 cm  Neck ROM: normal range of motion   Mouth opening: Normal     Cardiovascular  Regular rate and rhythm,  S1 and S2 normal,  no murmur, click, rub, or gallop  Rhythm: regular  Rate: normal         Dental  No notable dental hx       Pulmonary  Breath sounds clear to auscultation               Abdominal  Abdominal exam normal       Other Findings            Anesthetic Plan    ASA: 2  Anesthesia type: general      Post-op pain plan if not by surgeon: peripheral nerve block single    Induction: Intravenous  Anesthetic plan and risks discussed with: Patient

## 2019-08-24 NOTE — PROGRESS NOTES
Right foot with small amount of bloody and purulent drainage noted between toes, cleansed, covered with vaseline gauze, securing with kerlex dsg, tolerating well with no c/o pain or discomfort. Skid resistant sock applied with reminder to call for asst to be out of bed, agreed. No distress.

## 2019-08-24 NOTE — PROGRESS NOTES
SW reviewed patient's chart and conducted a baseline assessment. Discharge plan at this time is as follows:     Care Management Interventions  PCP Verified by CM:  Yes  Mode of Transport at Discharge: Self  Transition of [address] (CM Consult): Discharge Planning  Current Support Network: Own Home(Roommate )  Confirm Follow Up Transport: Friends  Plan discussed with Pt/Family/Caregiver: Yes  Freedom of Choice Offered: Yes  Discharge Location  Discharge Placement: Home with family assistance    Everette Tompkins, [address] Work   [address] [facility]  [email or web address]

## 2019-08-24 NOTE — ED NOTES
TRANSFER - OUT REPORT:    Verbal report given to Fredo Cohen RN on Debbie Alva  being transferred to [address] [place] [address] for routine progression of care       Report consisted of patients Situation, Background, Assessment and   Recommendations(SBAR). Information from the following report(s) ED Summary was reviewed with the receiving nurse. Lines:   Peripheral IV 08/23/19 Right Antecubital (Active)   Site Assessment Clean, dry, & intact 8/23/2019  5:51 PM   Phlebitis Assessment 0 8/23/2019  5:51 PM   Infiltration Assessment 0 8/23/2019  5:51 PM   Dressing Type Tape;Transparent 8/23/2019  5:51 PM   Hub Color/Line Status Pink;Flushed 8/23/2019  5:51 PM        Opportunity for questions and clarification was provided.       Patient transported with:   Registered Nurse

## 2019-08-24 NOTE — PROGRESS NOTES
Resting quietly, in bed, alert. Oriented to bed controls, nurse call light, gown applied. To bathroom with asst. RLE red, inflamed, warm with 3+ edema. Seated on commode to soak and wash off thick layer of hardened brown dirt, questionably stool from left foot in warm, soapy water. Back to bed. Right foot elevated on pillow. Instructed to call for asst to be out of bed with rationale given, agreed. Denies any pain or discomfort. Assessment started. Aware nurse to return to discuss plan of care.

## 2019-08-24 NOTE — PROGRESS NOTES
Tylenol 650 mg given PO for c/o headache. IVF infusing without difficulty. Humalog insulin 5 units given SC for SQBS 252.

## 2019-08-24 NOTE — H&P
INTERNAL MEDICINE H&P/CONSULT    Subjective:     Patient is a 49years old male with history of NDDM and non complicance presents with redness, swelling, pain of R foot and low grade fever for last 5 days. He do not recall injuries but he walks bare foot not infrequently. He has no PCP, on no medications for years despite his need for low dose insulin 2-3 y ago. Past Medical History:   Diagnosis Date    Diabetes [facility])       Past Surgical History:   Procedure Laterality Date    HX CHOLECYSTECTOMY        Prior to Admission medications    Not on File     No Known Allergies   Social History     Tobacco Use    Smoking status: Current Every Day Smoker   Substance Use Topics    Alcohol use: No        Family History:  HTN    Review of Systems   A comprehensive review of systems was negative except for that written in the HPI. Objective: Intake / Output:  No intake/output data recorded. No intake/output data recorded. Physical Exam:  Visit Vitals  /75 (BP 1 Location: Right arm, BP Patient Position: At rest)   Pulse 84   Temp 97.4 °F (36.3 °C)   Resp 17   Ht 5' 9\" (1.753 m)   Wt 95.3 kg (210 lb)   SpO2 99%   BMI 31.01 kg/m²     General appearance: awake, alert, cooperative, moderate distress, appears stated age -   Head: Normocephalic, without obvious abnormality, atraumatic  Back: symmetric, no curvature. ROM normal. No CVA tenderness. Lungs: clear to auscultation bilaterally  Heart: regular rate and rhythm, S1, S2 normal, no murmur, click, rub or gallop. Abdomen: soft, no tenderness, no distension, normal bowel sound, no masses, no organomegaly  Extremities: R foot proximally is red swollen w/ bluish discoloration at the 2-4th MCP joints w/ possible pin point of entry at the base of 3rd toe. Monofilament test is partially impaired. Skin: No rashes or ulceration.   Neurologic: Grossly intact     ECG: sinus rhythm     Data Review (Labs):   Recent Results (from the past 24 hour(s))   GLUCOSE, POC    Collection Time: 08/23/19  5:41 PM   Result Value Ref Range    Glucose (POC) 315 (H) 65 - 100 mg/dL   CBC WITH AUTOMATED DIFF    Collection Time: 08/23/19  5:54 PM   Result Value Ref Range    WBC 19.5 (H) 4.3 - 11.1 K/uL    RBC 5.03 4.23 - 5.6 M/uL    HGB 14.4 13.6 - 17.2 g/dL    HCT 42.0 41.1 - 50.3 %    MCV 83.5 79.6 - 97.8 FL    MCH 28.6 26.1 - 32.9 PG    MCHC 34.3 31.4 - 35.0 g/dL    RDW 12.6 11.9 - 14.6 %    PLATELET 242 516 - 430 K/uL    MPV 11.0 9.4 - 12.3 FL    ABSOLUTE NRBC 0.00 0.0 - 0.2 K/uL    DF AUTOMATED      NEUTROPHILS 87 (H) 43 - 78 %    LYMPHOCYTES 6 (L) 13 - 44 %    MONOCYTES 6 4.0 - 12.0 %    EOSINOPHILS 0 (L) 0.5 - 7.8 %    BASOPHILS 0 0.0 - 2.0 %    IMMATURE GRANULOCYTES 1 0.0 - 5.0 %    ABS. NEUTROPHILS 17.0 (H) 1.7 - 8.2 K/UL    ABS. LYMPHOCYTES 1.2 0.5 - 4.6 K/UL    ABS. MONOCYTES 1.2 0.1 - 1.3 K/UL    ABS. EOSINOPHILS 0.0 0.0 - 0.8 K/UL    ABS. BASOPHILS 0.1 0.0 - 0.2 K/UL    ABS. IMM. GRANS. 0.1 0.0 - 0.5 K/UL   METABOLIC PANEL, COMPREHENSIVE    Collection Time: 08/23/19  5:54 PM   Result Value Ref Range    Sodium 126 (L) 136 - 145 mmol/L    Potassium 3.7 3.5 - 5.1 mmol/L    Chloride 90 (L) 98 - 107 mmol/L    CO2 29 21 - 32 mmol/L    Anion gap 7 7 - 16 mmol/L    Glucose 337 (H) 65 - 100 mg/dL    BUN 9 6 - 23 MG/DL    Creatinine 1.21 0.8 - 1.5 MG/DL    GFR est AA >60 >60 ml/min/1.73m2    GFR est non-AA >60 >60 ml/min/1.73m2    Calcium 9.2 8.3 - 10.4 MG/DL    Bilirubin, total 1.5 (H) 0.2 - 1.1 MG/DL    ALT (SGPT) 14 12 - 65 U/L    AST (SGOT) 10 (L) 15 - 37 U/L    Alk.  phosphatase 129 50 - 136 U/L    Protein, total 7.8 6.3 - 8.2 g/dL    Albumin 3.4 (L) 3.5 - 5.0 g/dL    Globulin 4.4 (H) 2.3 - 3.5 g/dL    A-G Ratio 0.8 (L) 1.2 - 3.5     URIC ACID    Collection Time: 08/23/19  5:54 PM   Result Value Ref Range    Uric acid 3.8 2.6 - 6.0 MG/DL   HEMOGLOBIN A1C WITH EAG    Collection Time: 08/23/19  5:54 PM   Result Value Ref Range    Hemoglobin A1c 9.7 %    Est. average glucose 232 mg/dL POC LACTIC ACID    Collection Time: 08/23/19  6:40 PM   Result Value Ref Range    Lactic Acid (POC) 2.02 (H) 0.5 - 1.9 mmol/L   GLUCOSE, POC    Collection Time: 08/23/19  9:30 PM   Result Value Ref Range    Glucose (POC) 252 (H) 65 - 100 mg/dL       Assessment:     Principal Problem:    Cellulitis of right leg (8/23/2019)    Active Problems:    Foreign body (FB) in soft tissue (8/23/2019) possible per X-ray      Subcutaneous emphysema ([address] (8/23/2019)      Uncontrolled type II diabetes mellitus ([address] (8/23/2019)      Non-compliance (8/23/2019)      Hyponatremia (8/23/2019)        Plan:     Vancomycin+ Zosyn iv  Follow blood CS  IVF hydration  Insulin scale  DM educator and  consulted  May get surgical opinion after improvement of infection  AM lab  Patient is full code. Further management depends on patient progress. Thank you for the oppourtinity to contribute in the care of your patient. Time 30 minutes.     Signed By: Jacquelyn Jeffries MD     August 23, 2019

## 2019-08-24 NOTE — PROGRESS NOTES
Pharmacokinetic Consult to Pharmacist    Kaiden Blankenship is a 49 y.o. male being treated for SSTI with vancomycin. Height: 5' 9\" (175.3 cm)  Weight: 95.3 kg (210 lb)  Lab Results   Component Value Date/Time    BUN 9 08/23/2019 05:54 PM    Creatinine 1.21 08/23/2019 05:54 PM    WBC 19.5 (H) 08/23/2019 05:54 PM    Lactic Acid (POC) 2.02 (H) 08/23/2019 06:40 PM      Estimated Creatinine Clearance: 80.4 mL/min (based on SCr of 1.21 mg/dL). CULTURES:  Results     Procedure Component Value Units Date/Time    CULTURE, BLOOD [[id number]] Collected:  08/23/19 1840    Order Status:  Completed Specimen:  Blood Updated:  08/23/19 1846    CULTURE, BLOOD [[id number]] Collected:  08/23/19 1840    Order Status:  Completed Specimen:  Blood Updated:  08/23/19 1846          Day 1 of vancomycin. Goal trough is 10-20. Vancomycin dose initiated at 2000 mg x 1 dose; followed by Vanc 1500 mg IV q12h. Will continue to follow patient. Thank you,  Denita Lakhani, PharmD.

## 2019-08-25 ENCOUNTER — APPOINTMENT (OUTPATIENT)
Dept: MRI IMAGING | Age: 54
DRG: 854 | End: 2019-08-25
Attending: INTERNAL MEDICINE

## 2019-08-25 ENCOUNTER — ANESTHESIA (OUTPATIENT)
Dept: SURGERY | Age: 54
DRG: 854 | End: 2019-08-25

## 2019-08-25 LAB
ANION GAP SERPL CALC-SCNC: 8 MMOL/L (ref 7–16)
BASOPHILS # BLD: 0.1 K/UL (ref 0–0.2)
BASOPHILS NFR BLD: 1 % (ref 0–2)
BUN SERPL-MCNC: 13 MG/DL (ref 6–23)
CALCIUM SERPL-MCNC: 7.6 MG/DL (ref 8.3–10.4)
CHLORIDE SERPL-SCNC: 102 MMOL/L (ref 98–107)
CO2 SERPL-SCNC: 24 MMOL/L (ref 21–32)
CREAT SERPL-MCNC: 1.17 MG/DL (ref 0.8–1.5)
DIFFERENTIAL METHOD BLD: ABNORMAL
EOSINOPHIL # BLD: 0.1 K/UL (ref 0–0.8)
EOSINOPHIL NFR BLD: 1 % (ref 0.5–7.8)
ERYTHROCYTE [DISTWIDTH] IN BLOOD BY AUTOMATED COUNT: 12.5 % (ref 11.9–14.6)
GLUCOSE BLD STRIP.AUTO-MCNC: 133 MG/DL (ref 65–100)
GLUCOSE BLD STRIP.AUTO-MCNC: 164 MG/DL (ref 65–100)
GLUCOSE BLD STRIP.AUTO-MCNC: 166 MG/DL (ref 65–100)
GLUCOSE BLD STRIP.AUTO-MCNC: 178 MG/DL (ref 65–100)
GLUCOSE SERPL-MCNC: 157 MG/DL (ref 65–100)
HCT VFR BLD AUTO: 36.6 % (ref 41.1–50.3)
HGB BLD-MCNC: 12.3 G/DL (ref 13.6–17.2)
IMM GRANULOCYTES # BLD AUTO: 0.1 K/UL (ref 0–0.5)
IMM GRANULOCYTES NFR BLD AUTO: 1 % (ref 0–5)
LYMPHOCYTES # BLD: 1.5 K/UL (ref 0.5–4.6)
LYMPHOCYTES NFR BLD: 13 % (ref 13–44)
MCH RBC QN AUTO: 28.6 PG (ref 26.1–32.9)
MCHC RBC AUTO-ENTMCNC: 33.6 G/DL (ref 31.4–35)
MCV RBC AUTO: 85.1 FL (ref 79.6–97.8)
MONOCYTES # BLD: 0.7 K/UL (ref 0.1–1.3)
MONOCYTES NFR BLD: 6 % (ref 4–12)
NEUTS SEG # BLD: 8.7 K/UL (ref 1.7–8.2)
NEUTS SEG NFR BLD: 78 % (ref 43–78)
NRBC # BLD: 0 K/UL (ref 0–0.2)
PLATELET # BLD AUTO: 145 K/UL (ref 150–450)
PMV BLD AUTO: 10.7 FL (ref 9.4–12.3)
POTASSIUM SERPL-SCNC: 3.2 MMOL/L (ref 3.5–5.1)
RBC # BLD AUTO: 4.3 M/UL (ref 4.23–5.6)
SODIUM SERPL-SCNC: 134 MMOL/L (ref 136–145)
VANCOMYCIN TROUGH SERPL-MCNC: 18.3 UG/ML (ref 5–20)
WBC # BLD AUTO: 11.1 K/UL (ref 4.3–11.1)

## 2019-08-25 PROCEDURE — 74011250636 HC RX REV CODE- 250/636: Performed by: INTERNAL MEDICINE

## 2019-08-25 PROCEDURE — 87075 CULTR BACTERIA EXCEPT BLOOD: CPT

## 2019-08-25 PROCEDURE — 87205 SMEAR GRAM STAIN: CPT

## 2019-08-25 PROCEDURE — 87186 SC STD MICRODIL/AGAR DIL: CPT

## 2019-08-25 PROCEDURE — 74011250636 HC RX REV CODE- 250/636: Performed by: SURGERY

## 2019-08-25 PROCEDURE — 74011636637 HC RX REV CODE- 636/637: Performed by: SURGERY

## 2019-08-25 PROCEDURE — 76060000033 HC ANESTHESIA 1 TO 1.5 HR: Performed by: SURGERY

## 2019-08-25 PROCEDURE — 80202 ASSAY OF VANCOMYCIN: CPT

## 2019-08-25 PROCEDURE — 77030020263 HC SOL INJ SOD CL0.9% LFCR 1000ML

## 2019-08-25 PROCEDURE — 77030019940 HC BLNKT HYPOTHRM STRY -B: Performed by: ANESTHESIOLOGY

## 2019-08-25 PROCEDURE — 88300 SURGICAL PATH GROSS: CPT

## 2019-08-25 PROCEDURE — 85025 COMPLETE CBC W/AUTO DIFF WBC: CPT

## 2019-08-25 PROCEDURE — 74011000258 HC RX REV CODE- 258: Performed by: HOSPITALIST

## 2019-08-25 PROCEDURE — 77030018836 HC SOL IRR NACL ICUM -A: Performed by: SURGERY

## 2019-08-25 PROCEDURE — 77030010509 HC AIRWY LMA MSK TELE -A: Performed by: ANESTHESIOLOGY

## 2019-08-25 PROCEDURE — 87077 CULTURE AEROBIC IDENTIFY: CPT

## 2019-08-25 PROCEDURE — 74011250637 HC RX REV CODE- 250/637: Performed by: INTERNAL MEDICINE

## 2019-08-25 PROCEDURE — A9575 INJ GADOTERATE MEGLUMI 0.1ML: HCPCS | Performed by: HOSPITALIST

## 2019-08-25 PROCEDURE — 74011000258 HC RX REV CODE- 258: Performed by: SURGERY

## 2019-08-25 PROCEDURE — 77030020255 HC SOL INJ LR 1000ML BG

## 2019-08-25 PROCEDURE — 76210000006 HC OR PH I REC 0.5 TO 1 HR: Performed by: SURGERY

## 2019-08-25 PROCEDURE — 80048 BASIC METABOLIC PNL TOTAL CA: CPT

## 2019-08-25 PROCEDURE — 73720 MRI LWR EXTREMITY W/O&W/DYE: CPT

## 2019-08-25 PROCEDURE — 36415 COLL VENOUS BLD VENIPUNCTURE: CPT

## 2019-08-25 PROCEDURE — 88305 TISSUE EXAM BY PATHOLOGIST: CPT

## 2019-08-25 PROCEDURE — 82962 GLUCOSE BLOOD TEST: CPT

## 2019-08-25 PROCEDURE — 65660000000 HC RM CCU STEPDOWN

## 2019-08-25 PROCEDURE — 74011250636 HC RX REV CODE- 250/636: Performed by: HOSPITALIST

## 2019-08-25 PROCEDURE — 76010000138 HC OR TIME 0.5 TO 1 HR: Performed by: SURGERY

## 2019-08-25 PROCEDURE — 74011000250 HC RX REV CODE- 250

## 2019-08-25 PROCEDURE — 74011250636 HC RX REV CODE- 250/636

## 2019-08-25 RX ORDER — ESMOLOL HYDROCHLORIDE 10 MG/ML
INJECTION INTRAVENOUS AS NEEDED
Status: DISCONTINUED | OUTPATIENT
Start: 2019-08-25 | End: 2019-08-25 | Stop reason: HOSPADM

## 2019-08-25 RX ORDER — OXYCODONE HYDROCHLORIDE 5 MG/1
5 TABLET ORAL
Status: DISCONTINUED | OUTPATIENT
Start: 2019-08-25 | End: 2019-08-25 | Stop reason: HOSPADM

## 2019-08-25 RX ORDER — POTASSIUM CHLORIDE 20 MEQ/1
40 TABLET, EXTENDED RELEASE ORAL
Status: COMPLETED | OUTPATIENT
Start: 2019-08-25 | End: 2019-08-25

## 2019-08-25 RX ORDER — NALOXONE HYDROCHLORIDE 0.4 MG/ML
0.1 INJECTION, SOLUTION INTRAMUSCULAR; INTRAVENOUS; SUBCUTANEOUS AS NEEDED
Status: DISCONTINUED | OUTPATIENT
Start: 2019-08-25 | End: 2019-08-25 | Stop reason: HOSPADM

## 2019-08-25 RX ORDER — SODIUM CHLORIDE 0.9 % (FLUSH) 0.9 %
5-40 SYRINGE (ML) INJECTION EVERY 8 HOURS
Status: DISCONTINUED | OUTPATIENT
Start: 2019-08-25 | End: 2019-08-25 | Stop reason: HOSPADM

## 2019-08-25 RX ORDER — SODIUM CHLORIDE 0.9 % (FLUSH) 0.9 %
5-40 SYRINGE (ML) INJECTION AS NEEDED
Status: DISCONTINUED | OUTPATIENT
Start: 2019-08-25 | End: 2019-08-25 | Stop reason: HOSPADM

## 2019-08-25 RX ORDER — VANCOMYCIN HYDROCHLORIDE
1250 EVERY 12 HOURS
Status: DISCONTINUED | OUTPATIENT
Start: 2019-08-25 | End: 2019-08-27 | Stop reason: DRUGHIGH

## 2019-08-25 RX ORDER — ONDANSETRON 2 MG/ML
INJECTION INTRAMUSCULAR; INTRAVENOUS AS NEEDED
Status: DISCONTINUED | OUTPATIENT
Start: 2019-08-25 | End: 2019-08-25 | Stop reason: HOSPADM

## 2019-08-25 RX ORDER — LIDOCAINE HYDROCHLORIDE 20 MG/ML
INJECTION, SOLUTION EPIDURAL; INFILTRATION; INTRACAUDAL; PERINEURAL AS NEEDED
Status: DISCONTINUED | OUTPATIENT
Start: 2019-08-25 | End: 2019-08-25 | Stop reason: HOSPADM

## 2019-08-25 RX ORDER — SODIUM CHLORIDE, SODIUM LACTATE, POTASSIUM CHLORIDE, CALCIUM CHLORIDE 600; 310; 30; 20 MG/100ML; MG/100ML; MG/100ML; MG/100ML
INJECTION, SOLUTION INTRAVENOUS
Status: DISCONTINUED | OUTPATIENT
Start: 2019-08-25 | End: 2019-08-25 | Stop reason: HOSPADM

## 2019-08-25 RX ORDER — HYDROMORPHONE HYDROCHLORIDE 2 MG/ML
0.5 INJECTION, SOLUTION INTRAMUSCULAR; INTRAVENOUS; SUBCUTANEOUS
Status: DISCONTINUED | OUTPATIENT
Start: 2019-08-25 | End: 2019-08-25 | Stop reason: HOSPADM

## 2019-08-25 RX ORDER — PROPOFOL 10 MG/ML
INJECTION, EMULSION INTRAVENOUS AS NEEDED
Status: DISCONTINUED | OUTPATIENT
Start: 2019-08-25 | End: 2019-08-25 | Stop reason: HOSPADM

## 2019-08-25 RX ORDER — SODIUM CHLORIDE 0.9 % (FLUSH) 0.9 %
10 SYRINGE (ML) INJECTION
Status: COMPLETED | OUTPATIENT
Start: 2019-08-25 | End: 2019-08-25

## 2019-08-25 RX ORDER — GADOTERATE MEGLUMINE 376.9 MG/ML
20 INJECTION INTRAVENOUS
Status: COMPLETED | OUTPATIENT
Start: 2019-08-25 | End: 2019-08-25

## 2019-08-25 RX ORDER — HYDROCODONE BITARTRATE AND ACETAMINOPHEN 7.5; 325 MG/1; MG/1
1 TABLET ORAL AS NEEDED
Status: DISCONTINUED | OUTPATIENT
Start: 2019-08-25 | End: 2019-08-25 | Stop reason: HOSPADM

## 2019-08-25 RX ORDER — SODIUM CHLORIDE 9 MG/ML
100 INJECTION, SOLUTION INTRAVENOUS CONTINUOUS
Status: DISPENSED | OUTPATIENT
Start: 2019-08-25 | End: 2019-08-27

## 2019-08-25 RX ADMIN — ESMOLOL HYDROCHLORIDE 50 MCG: 10 INJECTION INTRAVENOUS at 08:16

## 2019-08-25 RX ADMIN — Medication 5 ML: at 07:56

## 2019-08-25 RX ADMIN — PIPERACILLIN SODIUM,TAZOBACTAM SODIUM 3.38 G: 3; .375 INJECTION, POWDER, FOR SOLUTION INTRAVENOUS at 02:56

## 2019-08-25 RX ADMIN — PIPERACILLIN SODIUM,TAZOBACTAM SODIUM 3.38 G: 3; .375 INJECTION, POWDER, FOR SOLUTION INTRAVENOUS at 20:07

## 2019-08-25 RX ADMIN — PROPOFOL 170 MG: 10 INJECTION, EMULSION INTRAVENOUS at 08:13

## 2019-08-25 RX ADMIN — SODIUM CHLORIDE 100 ML/HR: 900 INJECTION, SOLUTION INTRAVENOUS at 14:28

## 2019-08-25 RX ADMIN — INSULIN LISPRO 2 UNITS: 100 INJECTION, SOLUTION INTRAVENOUS; SUBCUTANEOUS at 17:25

## 2019-08-25 RX ADMIN — POTASSIUM CHLORIDE 40 MEQ: 20 TABLET, EXTENDED RELEASE ORAL at 07:39

## 2019-08-25 RX ADMIN — INSULIN GLARGINE 10 UNITS: 100 INJECTION, SOLUTION SUBCUTANEOUS at 10:57

## 2019-08-25 RX ADMIN — SODIUM CHLORIDE, SODIUM LACTATE, POTASSIUM CHLORIDE, CALCIUM CHLORIDE: 600; 310; 30; 20 INJECTION, SOLUTION INTRAVENOUS at 08:04

## 2019-08-25 RX ADMIN — SODIUM CHLORIDE 100 ML/HR: 900 INJECTION, SOLUTION INTRAVENOUS at 11:18

## 2019-08-25 RX ADMIN — Medication 10 ML: at 16:09

## 2019-08-25 RX ADMIN — INSULIN LISPRO 2 UNITS: 100 INJECTION, SOLUTION INTRAVENOUS; SUBCUTANEOUS at 22:11

## 2019-08-25 RX ADMIN — GADOTERATE MEGLUMINE 20 ML: 376.9 INJECTION INTRAVENOUS at 16:09

## 2019-08-25 RX ADMIN — ONDANSETRON 4 MG: 2 INJECTION INTRAMUSCULAR; INTRAVENOUS at 08:22

## 2019-08-25 RX ADMIN — Medication 5 ML: at 22:14

## 2019-08-25 RX ADMIN — VANCOMYCIN HYDROCHLORIDE 1250 MG: 10 INJECTION, POWDER, LYOPHILIZED, FOR SOLUTION INTRAVENOUS at 22:13

## 2019-08-25 RX ADMIN — SODIUM CHLORIDE 100 ML/HR: 900 INJECTION, SOLUTION INTRAVENOUS at 02:25

## 2019-08-25 RX ADMIN — Medication 5 ML: at 06:47

## 2019-08-25 RX ADMIN — LIDOCAINE HYDROCHLORIDE 100 MG: 20 INJECTION, SOLUTION EPIDURAL; INFILTRATION; INTRACAUDAL; PERINEURAL at 08:13

## 2019-08-25 RX ADMIN — VANCOMYCIN HYDROCHLORIDE 1500 MG: 10 INJECTION, POWDER, LYOPHILIZED, FOR SOLUTION INTRAVENOUS at 11:13

## 2019-08-25 RX ADMIN — PIPERACILLIN SODIUM,TAZOBACTAM SODIUM 3.38 G: 3; .375 INJECTION, POWDER, FOR SOLUTION INTRAVENOUS at 11:13

## 2019-08-25 RX ADMIN — INSULIN LISPRO 3 UNITS: 100 INJECTION, SOLUTION INTRAVENOUS; SUBCUTANEOUS at 11:51

## 2019-08-25 NOTE — PROGRESS NOTES
After confirming surgical plan, proper limb, surgeon, surgical consent and blood consent obtained and witnessed.

## 2019-08-25 NOTE — PERIOP NOTES
TRANSFER - OUT REPORT:    Verbal report given to Isidoro Hedrick (name) on Kaiden Blankenship  being transferred to [address] [place] [address] (unit) for routine post - op       Report consisted of patients Situation, Background, Assessment and   Recommendations(SBAR). Information from the following report(s) OR Summary, Procedure Summary, Intake/Output and MAR was reviewed with the receiving nurse. Lines:   Peripheral IV 08/23/19 Right Wrist (Active)   Site Assessment Clean, dry, & intact 8/25/2019  7:04 AM   Phlebitis Assessment 0 8/25/2019  7:04 AM   Infiltration Assessment 0 8/25/2019  7:04 AM   Dressing Status Clean, dry, & intact 8/25/2019  7:04 AM   Dressing Type Transparent;Tape 8/25/2019  7:04 AM   Hub Color/Line Status Infusing 8/25/2019  7:04 AM       Peripheral IV 08/24/19 Left Wrist (Active)   Site Assessment Clean, dry, & intact 8/25/2019  9:03 AM   Phlebitis Assessment 0 8/25/2019  9:03 AM   Infiltration Assessment 0 8/25/2019  9:03 AM   Dressing Status Clean, dry, & intact 8/25/2019  9:03 AM   Dressing Type Tape;Transparent 8/25/2019  9:03 AM   Hub Color/Line Status Infusing;Pink 8/25/2019  9:03 AM        Opportunity for questions and clarification was provided.       Patient transported with:

## 2019-08-25 NOTE — PROGRESS NOTES
Awoke easily. Bathed and rinsed with hibiclens solution with clean gown and linens. Remains NPO. No c/o. No distress. Waiting to be transported to surgery.

## 2019-08-25 NOTE — PROGRESS NOTES
Humalog 3 units given SC for SQBS 177. Aware to call for asst to be out of bed, rationale understood. All jewelry removed, denies any body piercings, denies any removable dental work. Pt plans to notify family of his surgery.

## 2019-08-25 NOTE — PROGRESS NOTES
Hospitalist Progress Note    2019  Admit Date: 2019  6:07 PM   NAME: Jackqueline Gosselin   :  1965   MRN:  [id number]   Attending: Sridhar Chao MD  PCP:  None    SUBJECTIVE:   Patient is a 49years old male with history of NDDM and non complicance presents with redness, swelling, pain of R foot and low grade fever for last 5 days. He do not recall injuries but he walks bare foot not infrequently. He has no PCP, on no medications for years despite his need for low dose insulin 2-3 y ago. Interval History (): patient examined at bedside. No acute events overnight. He denies fevers/chills, chest pain, shortness of breath. Pain in foot is tolerable currently. Patient went to OR for surgery, documented episode of Afib during surgery, patient converted spontaneously with no further events. Review of Systems negative with exception of pertinent positives noted above  PHYSICAL EXAM     Visit Vitals  /81 (BP 1 Location: Right arm, BP Patient Position: At rest;Supine)   Pulse 82   Temp 98.8 °F (37.1 °C)   Resp 15   Ht 5' 9\" (1.753 m)   Wt 95.3 kg (210 lb)   SpO2 93%   BMI 31.01 kg/m²      Temp (24hrs), Av.4 °F (36.9 °C), Min:96.1 °F (35.6 °C), Max:100.1 °F (37.8 °C)    Oxygen Therapy  O2 Sat (%): 93 % (19 1221)  O2 Device: Room air (19 0930)  O2 Flow Rate (L/min): 2 l/min (19 0930)    Intake/Output Summary (Last 24 hours) at 2019 1412  Last data filed at 2019 0938  Gross per 24 hour   Intake 1625 ml   Output 1100 ml   Net 525 ml      General: No acute distress    Lungs:  CTA Bilaterally. Heart:  Regular rate and rhythm,  No murmur, rub, or gallop  Abdomen: Soft, Non distended, Non tender, Positive bowel sounds  Extremities: Bleeding on plantar surface of right foot between 2nd/3rd toes with surrounding erythema. No palpable crepitus.    Neurologic:  No focal deficits    ASSESSMENT      Active Hospital Problems    Diagnosis Date Noted    Diabetic infection of right foot ([facility] 08/24/2019    Sepsis ([facility] 08/24/2019    Cellulitis of right leg 08/23/2019    Foreign body (FB) in soft tissue 08/23/2019    Subcutaneous emphysema ([facility] 08/23/2019    Uncontrolled type II diabetes mellitus ([facility] 08/23/2019    Personal history of noncompliance with medical treatment 08/23/2019    Hyponatremia 08/23/2019     Plan:    # Sepsis with diabetic foot infection of the right foot  - continue with vancomycin and Zosyn and IVFs  - blood cultures growing Strep species  - x-ray upon admission showing foreign body on plantar surface (patient admits to walking around barefoot), general surgery consulted with glass removed (also with 5th toe amputation and debridement/abscess drainage)  - with foreign body removed, will order MRI of right foot with/without contrast to assess for underlying osteomyelitis  - given presence of Strep bacteremia, will order TTE to assess for valvular vegetations  - consult ID tomorrow    # Paroxysmal atrial fibrillation  - observed during surgery and likely precipitated by sepsis  - patient spontaneously converted back to NSR  - no current indication for anticoagulation  - ordered cardiac telemetry  - sepsis management, as above    # DM type II, uncontrolled  - cannot afford any medications and does not take anything at home  - case management consult for assistance   - Lantus 10 units qdaily  - Humalog SSI and serial CBGs  - goal CBG between 140 and 180 while inpatient  - diabetes education consultation  - repeat HgbA1c of 9.7, patient would most benefit with starting on insulin regimen after hospitalization    # Hyponatremia  - resolving with IVFs  - continue to monitor    F/E/N: maintenance fluids, replete electrolytes as needed, diabetic diet after surgery    Ppx: Lovenox for VTE    Code Status: FULL CODE    Disposition: pending workup as above. Discussed with patient at bedside. All questions answered.      Signed By: Angella Brasher DO     August 25, 2019

## 2019-08-25 NOTE — BRIEF OP NOTE
BRIEF OPERATIVE NOTE    Date of Procedure: 8/25/2019   Preoperative Diagnosis:   Diabetic right foot infection  Retained Foreign Body Right Foot    Postoperative Diagnosis: same        Procedure(s):    Right foot diabetic abscess drainage  Debridement (skin, fat , fascia)  Right 5th toe amputation  Foreign body removal right foot (glass)     Surgeon(s) and Role:     * Aarti Holley MD - Primary         Surgical Assistant: none    Surgical Staff:  Circ-1: Arthur Tipton RN  Scrub Tech-1: Ernestina ALLISON  Event Time In Time Out   Incision Start 08/25/2019 0826    Incision Close 08/25/2019 0853      Anesthesia: General   Estimated Blood Loss: <10cc  Specimens:   ID Type Source Tests Collected by Time Destination   1 : RIGHT 5th TOE Preservative Toe  Aarti Holley MD 8/25/2019 0845 Pathology   2 : FOREIGN BODY RIGHT FOOT Preservative Foot, Right  Aarti Holley MD 8/25/2019 0011 Pathology   1 : ANAEROBIC & WOUND CULTURE W/GRAM STAIN RIGHT FOOT Wound Foot, Right CULTURE, ANAEROBIC, CULTURE, WOUND W Sampson Mena MD 8/25/2019 0830 Microbiology      Findings: marked deep ischemia; all 4 remaining toes are at risk of needing amputation in next 1-6 weeks     Complications: none  Implants: * No implants in log *

## 2019-08-25 NOTE — PROGRESS NOTES
Tylenol 650 mg given PO for c/o headache. Aware of NPO status at midnight. Agrees to call for asst to be out of bed.

## 2019-08-25 NOTE — PROGRESS NOTES
Received pt from PACU in stable condition. Dressing to right foot c/d/i and elevated. Pt denies pain at this time. IVF infusing without difficulty. Encouraged to call for help when needed. Dr. Rhodna Sanchez notified of afib in OR and received order for tele monitor.

## 2019-08-25 NOTE — PROGRESS NOTES
Assessment completed. AP 76, regular, lungs sounds clear. Aware of NPO status, beginning at midnight for surgery early a.m.

## 2019-08-25 NOTE — PROGRESS NOTES
Continues to rest quietly, awake with no c/o during bedside report given to Sade Madison RN. No distress.

## 2019-08-25 NOTE — PROGRESS NOTES
TRANSFER - IN REPORT:    Verbal report received from MANNY Pickett on Martin Ernandez  being received from PACU for routine post - op      Report consisted of patients Situation, Background, Assessment and   Recommendations(SBAR). Information from the following report(s) OR Summary, MAR and Recent Results was reviewed with the receiving nurse. Opportunity for questions and clarification was provided. Assessment completed upon patients arrival to unit and care assumed.

## 2019-08-25 NOTE — PROGRESS NOTES
Resting quietly, awake, resp even, unlab, skin warm, dry. Right foot elevated on pillow, red, inflamed, swollen, skin cracked between toes with discolored forefoot. Denies any pain. Betadine applied to toes, forefoot with dsg and kerlex applied per orders. Consumed sandwich tray given earlier. No c/o, no distress noted.

## 2019-08-25 NOTE — PROGRESS NOTES
Reported to Paul Sparrow RN Charge, pt's MEWs score \"3\", pulse 112, irregular, pt asymptomatic with plans to continue to monitor.

## 2019-08-25 NOTE — PROGRESS NOTES
TRANSFER - OUT REPORT:    Verbal report given to Elden Heimlich, RN on Jackqueline Gosselin  being transferred to OR(unit) for ordered procedure, right foot Drainage, debridement and removal of foreign object. Report consisted of patients Situation, Background, Assessment and   Recommendations(SBAR). Information from the following report(s) SBAR, Kardex, Intake/Output, MAR and Recent Results was reviewed with the receiving nurse. Lines:   Peripheral IV 08/23/19 Right Wrist (Active)   Site Assessment Clean, dry, & intact 8/25/2019  7:04 AM   Phlebitis Assessment 0 8/25/2019  7:04 AM   Infiltration Assessment 0 8/25/2019  7:04 AM   Dressing Status Clean, dry, & intact 8/25/2019  7:04 AM   Dressing Type Transparent;Tape 8/25/2019  7:04 AM   Hub Color/Line Status Infusing 8/25/2019  7:04 AM       Peripheral IV 08/24/19 Left Wrist (Active)   Site Assessment Clean, dry, & intact 8/25/2019  7:04 AM   Phlebitis Assessment 0 8/25/2019  7:04 AM   Infiltration Assessment 0 8/25/2019  7:04 AM   Dressing Status Clean, dry, & intact 8/25/2019  7:04 AM   Dressing Type Transparent;Tape 8/25/2019  7:04 AM   Hub Color/Line Status Capped; Patent 8/25/2019  7:04 AM        Opportunity for questions and clarification was provided.       Patient transported with:   Registered Nurse

## 2019-08-25 NOTE — PROGRESS NOTES
Up to bathroom with one person asst, voiding without difficulty and back to bed. No c/o. No distress.

## 2019-08-25 NOTE — PROGRESS NOTES
Vancomycin Consult    MD ordering: Andrew MEZA following? no  Indication: SSTI  DOT:  5-7  days  Goal level(s): 10 - 20    Ht Readings from Last 1 Encounters:   19 5' 9\" (1.753 m)      Wt Readings from Last 1 Encounters:   19 95.3 kg (210 lb)         Temp (24hrs), Av.2 °F (36.8 °C), Min:97.4 °F (36.3 °C), Max:98.7 °F (37.1 °C)    Dosing weight: 95 kg  49 y.o. Date:  Dose/Freq Admin Times Level/Time:    2 g IV LD 2223     1500 mg IV every 12 hours 1114  2202     1500 mg IV every 12 hours 1113   Trough at 1022 was 18.3    1250 mg IV every 12 hours (22)            Recent Labs     19  0454 19  1840 19  1754   BUN 10  --  9   CREA 1.20  --  1.21   WBC 14.2*  --  19.5*   LACPOC  --  2.02*  --      Estimated Creatinine Clearance: 81.1 mL/min (based on SCr of 1.2 mg/dL). Day 3 Assessment and Plan:  Vancomycin reduced to 1250 mg IV every 12 hours.       Chandrika WinnD, Board Certified Pharmacotherapy Specialist

## 2019-08-25 NOTE — PROGRESS NOTES
H&P/Consult Note/Progress Note/Office Note:   Jim Mercado  MRN: [id number]  :1965  Age:54 y.o.    HPI: Jim Mercado is a 49 y.o. male who was admitted on 19 from the ER by the Hospitalists with a diabetic right foot infection. He reported progressive, constant, severe right foot pain for at least 2 days. He walks barefoot. Nothing made pain better. It is worse with palpation or standing. He had associated low grade fever. He was placed on IV Abx by Hospitalists  He has h/o DM and non- compliance (No primary care doctor, not on DM meds)  Bld Cxs x 2 negative so far  Xray as below      19 Xray right foot, 3 views  Hx: Redness and swelling of the right foot. Pain     There is a question of a triangular shaped foreign body along the plantar surface of the right foot   at the level of the third proximal phalanx. There is subcutaneous emphysema, raising possibility for superimposed infection. No fracture or dislocation.     IMPRESSION: Findings suggestive of triangular radiopaque foreign body within the  plantar soft tissues at the level of the third proximal phalanx.  There is also  subcutaneous emphysema seen extending from the first through fifth digits at the  level of the proximal phalanges            Past Medical History:   Diagnosis Date    Diabetes [facility])      Past Surgical History:   Procedure Laterality Date    HX CHOLECYSTECTOMY       Current Facility-Administered Medications   Medication Dose Route Frequency    piperacillin-tazobactam (ZOSYN) 3.375 g in 0.9% sodium chloride (MBP/ADV) 100 mL  3.375 g IntraVENous Q8H    insulin glargine (LANTUS) injection 10 Units  10 Units SubCUTAneous DAILY    VANCOMYCIN INFORMATION NOTE   Other ONCE    povidone-iodine (BETADINE) 10 % topical solution   Topical DAILY    0.9% sodium chloride infusion  100 mL/hr IntraVENous CONTINUOUS    sodium chloride (NS) flush 5-40 mL  5-40 mL IntraVENous Q8H    sodium chloride (NS) flush 5-40 mL  5-40 mL IntraVENous PRN    HYDROcodone-acetaminophen (NORCO) 7.5-325 mg per tablet 1 Tab  1 Tab Oral Q4H PRN    morphine injection 2 mg  2 mg IntraVENous Q4H PRN    naloxone (NARCAN) injection 0.4 mg  0.4 mg IntraVENous PRN    diphenhydrAMINE (BENADRYL) capsule 25 mg  25 mg Oral Q4H PRN    promethazine (PHENERGAN) tablet 25 mg  25 mg Oral Q6H PRN    magnesium hydroxide (MILK OF MAGNESIA) 400 mg/5 mL oral suspension 30 mL  30 mL Oral DAILY PRN    nicotine (NICODERM CQ) 21 mg/24 hr patch 1 Patch  1 Patch TransDERmal DAILY PRN    enoxaparin (LOVENOX) injection 40 mg  40 mg SubCUTAneous Q24H    insulin lispro (HUMALOG) injection   SubCUTAneous AC&HS    acetaminophen (TYLENOL) tablet 650 mg  650 mg Oral Q4H PRN    vancomycin (VANCOCIN) 1500 mg in  ml infusion  1,500 mg IntraVENous Q12H     Patient has no known allergies. Social History     Socioeconomic History    Marital status: SINGLE     Spouse name: Not on file    Number of children: Not on file    Years of education: Not on file    Highest education level: Not on file   Tobacco Use    Smoking status: Current Every Day Smoker   Substance and Sexual Activity    Alcohol use: No    Sexual activity: Never     Social History     Tobacco Use   Smoking Status Current Every Day Smoker     History reviewed. No pertinent family history. ROS: The patient has no difficulty with chest pain or shortness of breath. No fever or chills. Comprehensive review of systems was otherwise unremarkable except as noted above.     Physical Exam:   Visit Vitals  /75   Pulse (!) 101   Temp 97.8 °F (36.6 °C)   Resp 16   Ht 5' 9\" (1.753 m)   Wt 210 lb (95.3 kg)   SpO2 94%   BMI 31.01 kg/m²     Vitals:    08/24/19 2011 08/25/19 0000 08/25/19 0258 08/25/19 0644   BP: 151/71 136/84 115/74 126/75   Pulse: 76 (!) 112 (!) 104 (!) 101   Resp: 20 20 18 16   Temp: 98.8 °F (37.1 °C) 99 °F (37.2 °C) 97.4 °F (36.3 °C) 97.8 °F (36.6 °C)   SpO2: 95% 95% 95% 94%   Weight:       Height: 08/25 0701 - 08/25 1900  In: 138 [I.V.:138]  Out: -   08/23 1901 - 08/25 0700  In: 5227 [P.O.:240; I.V.:2287]  Out: 2150 [Urine:2150]    Constitutional: Alert, oriented, cooperative patient in no acute distress; appears stated age    Eyes:Sclera are clear. EOMs intact  ENMT: no external lesions gross hearing normal; no obvious neck masses, no ear or lip lesions, nares normal  CV: RRR. Normal perfusion  Resp: No JVD. Breathing is  non-labored; no audible wheezing. GI: soft and non-distended     Musculoskeletal: unremarkable with normal function. No embolic signs or cyanosis. Right foot cellulitis with much necrotic tissue on plantar aspect of distal foot just proxmial to toes overlying distal metatarsals   No fluctuance; No crepitus  +edema  Hard to palpate pedal pulses    Neuro:  Oriented; moves all 4; no focal deficits  Psychiatric: normal affect and mood, no memory impairment    Recent vitals (if inpt):  Patient Vitals for the past 24 hrs:   BP Temp Pulse Resp SpO2   08/25/19 0644 126/75 97.8 °F (36.6 °C) (!) 101 16 94 %   08/25/19 0258 115/74 97.4 °F (36.3 °C) (!) 104 18 95 %   08/25/19 0000 136/84 99 °F (37.2 °C) (!) 112 20 95 %   08/24/19 2011 151/71 98.8 °F (37.1 °C) 76 20 95 %   08/24/19 1557 149/79 100.1 °F (37.8 °C) 82 16 98 %   08/24/19 1218 126/73 99.4 °F (37.4 °C) 87 16 97 %       Labs:  Recent Labs     08/25/19  0411  08/23/19  1754   WBC 11.1   < > 19.5*   HGB 12.3*   < > 14.4   *   < > 206   *   < > 126*   K 3.2*   < > 3.7      < > 90*   CO2 24   < > 29   BUN 13   < > 9   CREA 1.17   < > 1.21   *   < > 337*   TBILI  --   --  1.5*   SGOT  --   --  10*   ALT  --   --  14   AP  --   --  129    < > = values in this interval not displayed.        Lab Results   Component Value Date/Time    WBC 11.1 08/25/2019 04:11 AM    HGB 12.3 (L) 08/25/2019 04:11 AM    PLATELET 192 (L) 37/68/8219 04:11 AM    Sodium 134 (L) 08/25/2019 04:11 AM    Potassium 3.2 (L) 08/25/2019 04:11 AM    Chloride 102 08/25/2019 04:11 AM    CO2 24 08/25/2019 04:11 AM    BUN 13 08/25/2019 04:11 AM    Creatinine 1.17 08/25/2019 04:11 AM    Glucose 157 (H) 08/25/2019 04:11 AM    Bilirubin, total 1.5 (H) 08/23/2019 05:54 PM    AST (SGOT) 10 (L) 08/23/2019 05:54 PM    ALT (SGPT) 14 08/23/2019 05:54 PM    Alk. phosphatase 129 08/23/2019 05:54 PM       CT Results  (Last 48 hours)    None        chest X-ray      I reviewed recent labs, recent radiologic studies, and pertinent records including other doctor notes if needed. I independently reviewed radiology images for studies I described above or studies I have ordered. Admission date (for inpatients): 8/23/2019   * No surgery found *  Procedure(s):  Right foot debridment, removal foreign body    ASSESSMENT/PLAN:  Problem List  Date Reviewed: 8/24/2019          Codes Class Noted    * (Principal) Diabetic infection of right foot ([facility] ICD-10-CM: E11.628, L08.9  ICD-9-CM: 250.80, 686.9  8/24/2019        Sepsis ([facility] ICD-10-CM: A41.9  ICD-9-CM: 038.9, 995.91  8/24/2019        Cellulitis of right leg ICD-10-CM: L03.115  ICD-9-CM: 682.6  8/23/2019        Foreign body (FB) in soft tissue ICD-10-CM: M79.5  ICD-9-CM: 729.6  8/23/2019        Subcutaneous emphysema ([facility] ICD-10-CM: T79. 7XXA  ICD-9-CM: 958.7  8/23/2019        Uncontrolled type II diabetes mellitus ([facility] ICD-10-CM: E11.65  ICD-9-CM: 250.02  8/23/2019        Personal history of noncompliance with medical treatment ICD-10-CM: Z91.19  ICD-9-CM: V15.81  8/23/2019        Hyponatremia ICD-10-CM: E87.1  ICD-9-CM: 276.1  8/23/2019            Principal Problem:    Diabetic infection of right foot ([facility] (8/24/2019)    Active Problems:    Cellulitis of right leg (8/23/2019)      Foreign body (FB) in soft tissue (8/23/2019)      Subcutaneous emphysema ([facility] (8/23/2019)      Uncontrolled type II diabetes mellitus ([facility] (8/23/2019)      Personal history of noncompliance with medical treatment (8/23/2019)      Hyponatremia (8/23/2019)      Sepsis ([facility] (8/24/2019)         Diabetic right foot infection  We decided to proceed with OR debridement, drainage, and foreign body removal   NPO   IVF  Cont IV Zosyn/Vanc; Pharmacy dosing Vancomycin    Surgical drainage/debridement and FB removal recommended   Procedural risks discussed including inability to locate the FB, need for furtehr procedures, blood clots, progressive infection despite suregry, need for amputation. All questions answered. Pt elected to proceed    PVD  Evaluate with non-invasive arterial studies I have ordered    Uncontrolled DM  Inpt diabetic control  Sliding scale and semiquants    Non-Compliance with outpt DM regimen  Encourage compliance  Risks of non-compliance discussed including amputation and early death            I have personally performed a face-to-face diagnostic evaluation and management  service on this patient. I have independently seen the patient. I have independently obtained the above history from the patient/family. I have independently examined the patient with above findings. I have independently reviewed data/labs for this patient and developed the above plan of care (MDM). Signed: Sandoval Cavazos.  Lucien Knight MD, FACS

## 2019-08-25 NOTE — PROGRESS NOTES
Problem: Diabetes Self-Management  Goal: *Disease process and treatment process  Description  Define diabetes and identify own type of diabetes; list 3 options for treating diabetes. Outcome: Progressing Towards Goal  Goal: *Incorporating nutritional management into lifestyle  Description  Describe effect of type, amount and timing of food on blood glucose; list 3 methods for planning meals. Outcome: Progressing Towards Goal  Goal: *Incorporating physical activity into lifestyle  Description  State effect of exercise on blood glucose levels. Outcome: Progressing Towards Goal  Goal: *Developing strategies to promote health/change behavior  Description  Define the ABC's of diabetes; identify appropriate screenings, schedule and personal plan for screenings. Outcome: Progressing Towards Goal  Goal: *Using medications safely  Description  State effect of diabetes medications on diabetes; name diabetes medication taking, action and side effects. Outcome: Progressing Towards Goal  Goal: *Monitoring blood glucose, interpreting and using results  Description  Identify recommended blood glucose targets  and personal targets. Outcome: Progressing Towards Goal  Goal: *Prevention, detection, treatment of acute complications  Description  List symptoms of hyper- and hypoglycemia; describe how to treat low blood sugar and actions for lowering  high blood glucose level. Outcome: Progressing Towards Goal  Goal: *Prevention, detection and treatment of chronic complications  Description  Define the natural course of diabetes and describe the relationship of blood glucose levels to long term complications of diabetes.   Outcome: Progressing Towards Goal  Goal: *Developing strategies to address psychosocial issues  Description  Describe feelings about living with diabetes; identify support needed and support network  Outcome: Progressing Towards Goal  Goal: *Insulin pump training  Outcome: Progressing Towards Goal  Goal: *Sick day guidelines  Outcome: Progressing Towards Goal  Goal: *Patient Specific Goal (EDIT GOAL, INSERT TEXT)  Outcome: Progressing Towards Goal     Problem: Falls - Risk of  Goal: *Absence of Falls  Description  Document Se Heaton Fall Risk and appropriate interventions in the flowsheet.   Outcome: Progressing Towards Goal  Note:   Fall Risk Interventions:  Mobility Interventions: Bed/chair exit alarm         Medication Interventions: Bed/chair exit alarm    Elimination Interventions: Call light in reach

## 2019-08-25 NOTE — ANESTHESIA POSTPROCEDURE EVALUATION
Procedure(s):  Right foot debridment, removal foreign body, right 5th toe amputation . general    Anesthesia Post Evaluation        Patient location during evaluation: PACU  Patient participation: complete - patient participated  Level of consciousness: awake and alert  Pain management: adequate  Airway patency: patent  Anesthetic complications: no  Cardiovascular status: acceptable  Respiratory status: acceptable  Hydration status: acceptable  Comments: On arrival to OR, pt was discovered to have AFib with -130. IV esmolol given with rate reduction about 100. Following induction of anesthesia, pt spontaneously converted to NSR which he maintained throughout remainder of perioperative course.   Post anesthesia nausea and vomiting:  none      Vitals Value Taken Time   /67 8/25/2019  9:20 AM   Temp 37.1 °C (98.7 °F) 8/25/2019  9:04 AM   Pulse 84 8/25/2019  9:20 AM   Resp 18 8/25/2019  9:20 AM   SpO2 98 % 8/25/2019  9:20 AM

## 2019-08-26 ENCOUNTER — APPOINTMENT (OUTPATIENT)
Dept: ULTRASOUND IMAGING | Age: 54
DRG: 854 | End: 2019-08-26
Attending: SURGERY

## 2019-08-26 LAB
ANION GAP SERPL CALC-SCNC: 7 MMOL/L (ref 7–16)
BASOPHILS # BLD: 0.1 K/UL (ref 0–0.2)
BASOPHILS NFR BLD: 1 % (ref 0–2)
BUN SERPL-MCNC: 15 MG/DL (ref 6–23)
CALCIUM SERPL-MCNC: 8 MG/DL (ref 8.3–10.4)
CHLORIDE SERPL-SCNC: 104 MMOL/L (ref 98–107)
CO2 SERPL-SCNC: 26 MMOL/L (ref 21–32)
CREAT SERPL-MCNC: 1.09 MG/DL (ref 0.8–1.5)
DIFFERENTIAL METHOD BLD: ABNORMAL
EOSINOPHIL # BLD: 0.1 K/UL (ref 0–0.8)
EOSINOPHIL NFR BLD: 1 % (ref 0.5–7.8)
ERYTHROCYTE [DISTWIDTH] IN BLOOD BY AUTOMATED COUNT: 12.7 % (ref 11.9–14.6)
GLUCOSE BLD STRIP.AUTO-MCNC: 124 MG/DL (ref 65–100)
GLUCOSE BLD STRIP.AUTO-MCNC: 131 MG/DL (ref 65–100)
GLUCOSE BLD STRIP.AUTO-MCNC: 152 MG/DL (ref 65–100)
GLUCOSE BLD STRIP.AUTO-MCNC: 180 MG/DL (ref 65–100)
GLUCOSE SERPL-MCNC: 125 MG/DL (ref 65–100)
HCT VFR BLD AUTO: 34.7 % (ref 41.1–50.3)
HGB BLD-MCNC: 11.6 G/DL (ref 13.6–17.2)
IMM GRANULOCYTES # BLD AUTO: 0.1 K/UL (ref 0–0.5)
IMM GRANULOCYTES NFR BLD AUTO: 1 % (ref 0–5)
LYMPHOCYTES # BLD: 1.7 K/UL (ref 0.5–4.6)
LYMPHOCYTES NFR BLD: 19 % (ref 13–44)
MCH RBC QN AUTO: 28.6 PG (ref 26.1–32.9)
MCHC RBC AUTO-ENTMCNC: 33.4 G/DL (ref 31.4–35)
MCV RBC AUTO: 85.5 FL (ref 79.6–97.8)
MONOCYTES # BLD: 0.6 K/UL (ref 0.1–1.3)
MONOCYTES NFR BLD: 7 % (ref 4–12)
NEUTS SEG # BLD: 6.6 K/UL (ref 1.7–8.2)
NEUTS SEG NFR BLD: 72 % (ref 43–78)
NRBC # BLD: 0 K/UL (ref 0–0.2)
PLATELET # BLD AUTO: 187 K/UL (ref 150–450)
PMV BLD AUTO: 10.7 FL (ref 9.4–12.3)
POTASSIUM SERPL-SCNC: 4.1 MMOL/L (ref 3.5–5.1)
RBC # BLD AUTO: 4.06 M/UL (ref 4.23–5.6)
SODIUM SERPL-SCNC: 137 MMOL/L (ref 136–145)
WBC # BLD AUTO: 9.1 K/UL (ref 4.3–11.1)

## 2019-08-26 PROCEDURE — 0JCQ0ZZ EXTIRPATION OF MATTER FROM RIGHT FOOT SUBCUTANEOUS TISSUE AND FASCIA, OPEN APPROACH: ICD-10-PCS | Performed by: SURGERY

## 2019-08-26 PROCEDURE — 74011636637 HC RX REV CODE- 636/637: Performed by: SURGERY

## 2019-08-26 PROCEDURE — 80048 BASIC METABOLIC PNL TOTAL CA: CPT

## 2019-08-26 PROCEDURE — 90714 TD VACC NO PRESV 7 YRS+ IM: CPT | Performed by: INTERNAL MEDICINE

## 2019-08-26 PROCEDURE — 97535 SELF CARE MNGMENT TRAINING: CPT

## 2019-08-26 PROCEDURE — 0Y6X0Z0 DETACHMENT AT RIGHT 5TH TOE, COMPLETE, OPEN APPROACH: ICD-10-PCS | Performed by: SURGERY

## 2019-08-26 PROCEDURE — 77030020263 HC SOL INJ SOD CL0.9% LFCR 1000ML

## 2019-08-26 PROCEDURE — 77030018836 HC SOL IRR NACL ICUM -A

## 2019-08-26 PROCEDURE — 36415 COLL VENOUS BLD VENIPUNCTURE: CPT

## 2019-08-26 PROCEDURE — 74011250636 HC RX REV CODE- 250/636: Performed by: INTERNAL MEDICINE

## 2019-08-26 PROCEDURE — 82962 GLUCOSE BLOOD TEST: CPT

## 2019-08-26 PROCEDURE — 74011250637 HC RX REV CODE- 250/637: Performed by: SURGERY

## 2019-08-26 PROCEDURE — 65660000000 HC RM CCU STEPDOWN

## 2019-08-26 PROCEDURE — 74011000258 HC RX REV CODE- 258: Performed by: SURGERY

## 2019-08-26 PROCEDURE — 74011250637 HC RX REV CODE- 250/637: Performed by: HOSPITALIST

## 2019-08-26 PROCEDURE — 97530 THERAPEUTIC ACTIVITIES: CPT

## 2019-08-26 PROCEDURE — 97165 OT EVAL LOW COMPLEX 30 MIN: CPT

## 2019-08-26 PROCEDURE — C8929 TTE W OR WO FOL WCON,DOPPLER: HCPCS

## 2019-08-26 PROCEDURE — 0LBV0ZZ EXCISION OF RIGHT FOOT TENDON, OPEN APPROACH: ICD-10-PCS | Performed by: SURGERY

## 2019-08-26 PROCEDURE — 93925 LOWER EXTREMITY STUDY: CPT

## 2019-08-26 PROCEDURE — 85025 COMPLETE CBC W/AUTO DIFF WBC: CPT

## 2019-08-26 PROCEDURE — 74011000250 HC RX REV CODE- 250: Performed by: INTERNAL MEDICINE

## 2019-08-26 PROCEDURE — 74011250636 HC RX REV CODE- 250/636: Performed by: SURGERY

## 2019-08-26 PROCEDURE — 97163 PT EVAL HIGH COMPLEX 45 MIN: CPT

## 2019-08-26 RX ORDER — CLONIDINE HYDROCHLORIDE 0.1 MG/1
0.1 TABLET ORAL
Status: DISCONTINUED | OUTPATIENT
Start: 2019-08-26 | End: 2019-09-04 | Stop reason: HOSPADM

## 2019-08-26 RX ADMIN — Medication: at 08:11

## 2019-08-26 RX ADMIN — CLOSTRIDIUM TETANI TOXOID ANTIGEN (FORMALDEHYDE INACTIVATED) AND CORYNEBACTERIUM DIPHTHERIAE TOXOID ANTIGEN (FORMALDEHYDE INACTIVATED) 0.5 ML: 5; 2 INJECTION, SUSPENSION INTRAMUSCULAR at 10:58

## 2019-08-26 RX ADMIN — ACETAMINOPHEN 650 MG: 325 TABLET, FILM COATED ORAL at 03:49

## 2019-08-26 RX ADMIN — Medication 10 ML: at 16:49

## 2019-08-26 RX ADMIN — INSULIN LISPRO 2 UNITS: 100 INJECTION, SOLUTION INTRAVENOUS; SUBCUTANEOUS at 12:20

## 2019-08-26 RX ADMIN — INSULIN LISPRO 3 UNITS: 100 INJECTION, SOLUTION INTRAVENOUS; SUBCUTANEOUS at 16:49

## 2019-08-26 RX ADMIN — PIPERACILLIN SODIUM,TAZOBACTAM SODIUM 3.38 G: 3; .375 INJECTION, POWDER, FOR SOLUTION INTRAVENOUS at 10:37

## 2019-08-26 RX ADMIN — CLONIDINE HYDROCHLORIDE 0.1 MG: 0.1 TABLET ORAL at 04:16

## 2019-08-26 RX ADMIN — Medication 10 ML: at 22:21

## 2019-08-26 RX ADMIN — INSULIN GLARGINE 10 UNITS: 100 INJECTION, SOLUTION SUBCUTANEOUS at 08:11

## 2019-08-26 RX ADMIN — PERFLUTREN 1 ML: 6.52 INJECTION, SUSPENSION INTRAVENOUS at 09:00

## 2019-08-26 RX ADMIN — ENOXAPARIN SODIUM 40 MG: 40 INJECTION SUBCUTANEOUS at 08:02

## 2019-08-26 RX ADMIN — PIPERACILLIN SODIUM,TAZOBACTAM SODIUM 3.38 G: 3; .375 INJECTION, POWDER, FOR SOLUTION INTRAVENOUS at 19:50

## 2019-08-26 RX ADMIN — VANCOMYCIN HYDROCHLORIDE 1250 MG: 10 INJECTION, POWDER, LYOPHILIZED, FOR SOLUTION INTRAVENOUS at 22:18

## 2019-08-26 RX ADMIN — VANCOMYCIN HYDROCHLORIDE 1250 MG: 10 INJECTION, POWDER, LYOPHILIZED, FOR SOLUTION INTRAVENOUS at 10:37

## 2019-08-26 RX ADMIN — PIPERACILLIN SODIUM,TAZOBACTAM SODIUM 3.38 G: 3; .375 INJECTION, POWDER, FOR SOLUTION INTRAVENOUS at 03:46

## 2019-08-26 RX ADMIN — SODIUM CHLORIDE 100 ML/HR: 900 INJECTION, SOLUTION INTRAVENOUS at 03:51

## 2019-08-26 RX ADMIN — SODIUM CHLORIDE 100 ML/HR: 900 INJECTION, SOLUTION INTRAVENOUS at 16:50

## 2019-08-26 NOTE — PROGRESS NOTES
Hospitalist Progress Note    2019  Admit Date: 2019  6:07 PM   NAME: Adrienne Ramesh   :  1965   MRN:  [id number]   Attending: Jacobo Holt DO  PCP:  None    SUBJECTIVE:   Patient is a 49years old male with history of NDDM and non complicance presents with redness, swelling, pain of R foot and low grade fever for last 5 days. He do not recall injuries but he walks bare foot not infrequently. He has no PCP, on no medications for years despite his need for low dose insulin 2-3 y ago. Interval History (): patient examined at bedside. No acute events overnight. He denies fevers/chills, chest pain, shortness of breath. Pain in foot is tolerable currently. He is POD #1 for surgical debridement/glass removal (FB)/right fifth toe amputation in the setting of diabetic foot infection. No current fevers/chills, chest pain, shortness of breath, abdominal pain, nausea/vomiting, diarrhea. Review of Systems negative with exception of pertinent positives noted above  PHYSICAL EXAM     Visit Vitals  /82 (BP 1 Location: Left arm, BP Patient Position: At rest;Supine)   Pulse 62   Temp 98 °F (36.7 °C)   Resp 18   Ht 5' 9\" (1.753 m)   Wt 95.3 kg (210 lb)   SpO2 97%   BMI 31.01 kg/m²      Temp (24hrs), Av.9 °F (36.6 °C), Min:97.4 °F (36.3 °C), Max:98.8 °F (37.1 °C)    Oxygen Therapy  O2 Sat (%): 97 % (19 0754)  O2 Device: Room air (19 0801)  O2 Flow Rate (L/min): 2 l/min (19 0930)    Intake/Output Summary (Last 24 hours) at 2019 1146  Last data filed at 2019 1057  Gross per 24 hour   Intake 1664 ml   Output 1050 ml   Net 614 ml      General: No acute distress    Lungs:  CTA Bilaterally. Heart:  Regular rate and rhythm,  No murmur, rub, or gallop  Abdomen: Soft, Non distended, Non tender, Positive bowel sounds  Extremities: Bandaging in place with some visible bleeding, no purulence.     Neurologic:  No focal deficits    ASSESSMENT      Active Hospital Problems    Diagnosis Date Noted    Diabetic infection of right foot ([facility] 08/24/2019    Sepsis ([facility] 08/24/2019    Cellulitis of right leg 08/23/2019    Foreign body (FB) in soft tissue 08/23/2019    Subcutaneous emphysema ([facility] 08/23/2019    Uncontrolled type II diabetes mellitus ([facility] 08/23/2019    Personal history of noncompliance with medical treatment 08/23/2019    Hyponatremia 08/23/2019     Plan:    # Sepsis with diabetic foot infection of the right foot  - continue with vancomycin and Zosyn and IVFs  - blood cultures growing Strep species with identity and susceptibilities pending (likely originated from surface of skin on foot)  - x-ray upon admission showing foreign body on plantar surface (patient admits to walking around barefoot), general surgery consulted with glass removed (also with 5th toe amputation and debridement/abscess drainage)  - ID consulted, appreciate recs  - MRI foot showing post-surgical soft tissue changes without any evident osteomyelitis or abscess  - ordered TTE to evaluate for valvular vegetations given Strep bactermia  - consult ID tomorrow    # Paroxysmal atrial fibrillation  - observed during surgery and likely precipitated by sepsis  - patient spontaneously converted back to NSR  - no current indication for anticoagulation  - ordered cardiac telemetry  - sepsis management, as above    # DM type II, uncontrolled  - cannot afford any medications and does not take anything at home  - case management consult for assistance   - Lantus 10 units qdaily  - Humalog SSI and serial CBGs  - goal CBG between 140 and 180 while inpatient  - diabetes education consultation  - repeat HgbA1c of 9.7, patient would most benefit with starting on insulin regimen after hospitalization    # Hyponatremia  - resolved  - continue to monitor    F/E/N: discontinue fluids, replete electrolytes as needed, diabetic diet    Ppx: Lovenox for VTE    Code Status: FULL CODE    Disposition: pending workup as above.  Discussed with patient at bedside. All questions answered. Patient without insurance but will likely need rehab.  Discussed with PT and will see if general surgery would agree with boot to help with weight offloading as patient currently has difficulty with climbing steps (has 6 steps at home)    Signed By: Christine Monzon DO     August 26, 2019

## 2019-08-26 NOTE — PROGRESS NOTES
Problem: Diabetes Self-Management  Goal: *Disease process and treatment process  Description  Define diabetes and identify own type of diabetes; list 3 options for treating diabetes. Outcome: Progressing Towards Goal  Goal: *Incorporating nutritional management into lifestyle  Description  Describe effect of type, amount and timing of food on blood glucose; list 3 methods for planning meals. Outcome: Progressing Towards Goal  Goal: *Incorporating physical activity into lifestyle  Description  State effect of exercise on blood glucose levels. Outcome: Progressing Towards Goal  Goal: *Developing strategies to promote health/change behavior  Description  Define the ABC's of diabetes; identify appropriate screenings, schedule and personal plan for screenings. Outcome: Progressing Towards Goal  Goal: *Using medications safely  Description  State effect of diabetes medications on diabetes; name diabetes medication taking, action and side effects. Outcome: Progressing Towards Goal  Goal: *Monitoring blood glucose, interpreting and using results  Description  Identify recommended blood glucose targets  and personal targets. Outcome: Progressing Towards Goal  Goal: *Prevention, detection, treatment of acute complications  Description  List symptoms of hyper- and hypoglycemia; describe how to treat low blood sugar and actions for lowering  high blood glucose level. Outcome: Progressing Towards Goal  Goal: *Prevention, detection and treatment of chronic complications  Description  Define the natural course of diabetes and describe the relationship of blood glucose levels to long term complications of diabetes.   Outcome: Progressing Towards Goal  Goal: *Developing strategies to address psychosocial issues  Description  Describe feelings about living with diabetes; identify support needed and support network  Outcome: Progressing Towards Goal  Goal: *Insulin pump training  Outcome: Progressing Towards Goal  Goal: *Sick day guidelines  Outcome: Progressing Towards Goal  Goal: *Patient Specific Goal (EDIT GOAL, INSERT TEXT)  Outcome: Progressing Towards Goal     Problem: Falls - Risk of  Goal: *Absence of Falls  Description  Document Travis Boeck Fall Risk and appropriate interventions in the flowsheet.   Outcome: Progressing Towards Goal  Note:   Fall Risk Interventions:  Mobility Interventions: Bed/chair exit alarm         Medication Interventions: Bed/chair exit alarm    Elimination Interventions: Call light in reach

## 2019-08-26 NOTE — PROGRESS NOTES
Shift assessment complete. Pt resting in bed. A&Ox4. Respirations present, even, unlabored. Lung sounds clear to auscultation. HR regular, S1&S2 auscultated. Right foot dressing in place and elevated. IVF infusing without difficulty. Pt denies pain at this time. Bed in lowest position, call light within reach, side rails x3. Encouraged to call for help when needed.

## 2019-08-26 NOTE — PROGRESS NOTES
H&P/Consult Note/Progress Note/Office Note:   Stephani Mena  MRN: [id number]  :1965  Age:54 y.o.    HPI: Stephani Mena is a 49 y.o. male who was admitted on 19 from the ER by the Hospitalists with a diabetic right foot infection. He reported progressive, constant, severe right foot pain for at least 2 days. He walks barefoot. Nothing made pain better. It is worse with palpation or standing. He had associated low grade fever. He was placed on IV Abx by Hospitalists  He has h/o DM and non- compliance (No primary care doctor, not on DM meds)  Bld Cxs x 2 negative so far  Xray as below      19 Xray right foot, 3 views  Hx: Redness and swelling of the right foot. Pain     There is a question of a triangular shaped foreign body along the plantar surface of the right foot   at the level of the third proximal phalanx. There is subcutaneous emphysema, raising possibility for superimposed infection. No fracture or dislocation.     IMPRESSION: Findings suggestive of triangular radiopaque foreign body within the  plantar soft tissues at the level of the third proximal phalanx.  There is also  subcutaneous emphysema seen extending from the first through fifth digits at the  level of the proximal phalanges    19 POD1 right foot debridement, abscess drainage, 5th toe amputation, and FB removal        Past Medical History:   Diagnosis Date    Diabetes [facility])      Past Surgical History:   Procedure Laterality Date    HX CHOLECYSTECTOMY       Current Facility-Administered Medications   Medication Dose Route Frequency    vancomycin (VANCOCIN) 1250 mg in  ml infusion  1,250 mg IntraVENous Q12H    0.9% sodium chloride infusion  100 mL/hr IntraVENous CONTINUOUS    piperacillin-tazobactam (ZOSYN) 3.375 g in 0.9% sodium chloride (MBP/ADV) 100 mL  3.375 g IntraVENous Q8H    insulin glargine (LANTUS) injection 10 Units  10 Units SubCUTAneous DAILY    povidone-iodine (BETADINE) 10 % topical solution Topical DAILY    sodium chloride (NS) flush 5-40 mL  5-40 mL IntraVENous Q8H    sodium chloride (NS) flush 5-40 mL  5-40 mL IntraVENous PRN    HYDROcodone-acetaminophen (NORCO) 7.5-325 mg per tablet 1 Tab  1 Tab Oral Q4H PRN    morphine injection 2 mg  2 mg IntraVENous Q4H PRN    naloxone (NARCAN) injection 0.4 mg  0.4 mg IntraVENous PRN    diphenhydrAMINE (BENADRYL) capsule 25 mg  25 mg Oral Q4H PRN    promethazine (PHENERGAN) tablet 25 mg  25 mg Oral Q6H PRN    magnesium hydroxide (MILK OF MAGNESIA) 400 mg/5 mL oral suspension 30 mL  30 mL Oral DAILY PRN    nicotine (NICODERM CQ) 21 mg/24 hr patch 1 Patch  1 Patch TransDERmal DAILY PRN    enoxaparin (LOVENOX) injection 40 mg  40 mg SubCUTAneous Q24H    insulin lispro (HUMALOG) injection   SubCUTAneous AC&HS    acetaminophen (TYLENOL) tablet 650 mg  650 mg Oral Q4H PRN     Patient has no known allergies. Social History     Socioeconomic History    Marital status: SINGLE     Spouse name: Not on file    Number of children: Not on file    Years of education: Not on file    Highest education level: Not on file   Tobacco Use    Smoking status: Current Every Day Smoker   Substance and Sexual Activity    Alcohol use: No    Sexual activity: Never     Social History     Tobacco Use   Smoking Status Current Every Day Smoker     History reviewed. No pertinent family history. ROS: The patient has no difficulty with chest pain or shortness of breath. No fever or chills. Comprehensive review of systems was otherwise unremarkable except as noted above.     Physical Exam:   Visit Vitals  /86   Pulse 79   Temp 98.1 °F (36.7 °C)   Resp 20   Ht 5' 9\" (1.753 m)   Wt 210 lb (95.3 kg)   SpO2 95%   BMI 31.01 kg/m²     Vitals:    08/25/19 1221 08/25/19 1706 08/25/19 2041 08/25/19 2346   BP: 119/81 129/79 130/76 158/86   Pulse: 82 74 84 79   Resp: 15 15 18 20   Temp: 98.8 °F (37.1 °C) 97.5 °F (36.4 °C) 97.5 °F (36.4 °C) 98.1 °F (36.7 °C)   SpO2: 93% 97% 96% 95% Weight:       Height:         08/25 1901 - 08/26 0700  In: -   Out: 275 [Urine:275]  08/24 0701 - 08/25 1900  In: 5048 [P.O.:240; I.V.:2475]  Out: 1500 [Urine:1500]    Constitutional: Alert, oriented, cooperative patient in no acute distress; appears stated age    Eyes:Sclera are clear. EOMs intact  ENMT: no external lesions gross hearing normal; no obvious neck masses, no ear or lip lesions, nares normal  CV: RRR. Normal perfusion  Resp: No JVD. Breathing is  non-labored; no audible wheezing. GI: soft and non-distended     Musculoskeletal: unremarkable with normal function. No embolic signs or cyanosis. Right 5th toe amputation. Exposed tendons on plantar right foot  Tenuous right 2nd, 3rd, and 4th toes. No fluctuance;  No crepitus  +edema  Hard to palpate pedal pulses    Neuro:  Oriented; moves all 4; no focal deficits  Psychiatric: normal affect and mood, no memory impairment    Recent vitals (if inpt):  Patient Vitals for the past 24 hrs:   BP Temp Pulse Resp SpO2   08/25/19 2346 158/86 98.1 °F (36.7 °C) 79 20 95 %   08/25/19 2041 130/76 97.5 °F (36.4 °C) 84 18 96 %   08/25/19 1706 129/79 97.5 °F (36.4 °C) 74 15 97 %   08/25/19 1221 119/81 98.8 °F (37.1 °C) 82 15 93 %   08/25/19 0943 105/69 96.1 °F (35.6 °C) 80 20 95 %   08/25/19 0930 132/66 98.6 °F (37 °C) 80 18 99 %   08/25/19 0920 118/67 -- 84 18 98 %   08/25/19 0915 112/64 -- 82 18 97 %   08/25/19 0910 103/59 -- 83 18 97 %   08/25/19 0904 108/64 98.7 °F (37.1 °C) 79 18 99 %   08/25/19 0644 126/75 97.8 °F (36.6 °C) (!) 101 16 94 %   08/25/19 0258 115/74 97.4 °F (36.3 °C) (!) 104 18 95 %       Labs:  Recent Labs     08/25/19  0411  08/23/19  1754   WBC 11.1   < > 19.5*   HGB 12.3*   < > 14.4   *   < > 206   *   < > 126*   K 3.2*   < > 3.7      < > 90*   CO2 24   < > 29   BUN 13   < > 9   CREA 1.17   < > 1.21   *   < > 337*   TBILI  --   --  1.5*   SGOT  --   --  10*   ALT  --   --  14   AP  --   --  129    < > = values in this interval not displayed. Lab Results   Component Value Date/Time    WBC 11.1 08/25/2019 04:11 AM    HGB 12.3 (L) 08/25/2019 04:11 AM    PLATELET 777 (L) 00/21/4303 04:11 AM    Sodium 134 (L) 08/25/2019 04:11 AM    Potassium 3.2 (L) 08/25/2019 04:11 AM    Chloride 102 08/25/2019 04:11 AM    CO2 24 08/25/2019 04:11 AM    BUN 13 08/25/2019 04:11 AM    Creatinine 1.17 08/25/2019 04:11 AM    Glucose 157 (H) 08/25/2019 04:11 AM    Bilirubin, total 1.5 (H) 08/23/2019 05:54 PM    AST (SGOT) 10 (L) 08/23/2019 05:54 PM    ALT (SGPT) 14 08/23/2019 05:54 PM    Alk. phosphatase 129 08/23/2019 05:54 PM       CT Results  (Last 48 hours)    None        chest X-ray      I reviewed recent labs, recent radiologic studies, and pertinent records including other doctor notes if needed. I independently reviewed radiology images for studies I described above or studies I have ordered. Admission date (for inpatients): 8/23/2019   * No surgery found *  Procedure(s):  Right foot debridment, removal foreign body, right 5th toe amputation     ASSESSMENT/PLAN:  Problem List  Date Reviewed: 8/24/2019          Codes Class Noted    * (Principal) Diabetic infection of right foot ([facility] ICD-10-CM: E11.628, L08.9  ICD-9-CM: 250.80, 686.9  8/24/2019        Sepsis ([facility] ICD-10-CM: A41.9  ICD-9-CM: 038.9, 995.91  8/24/2019        Cellulitis of right leg ICD-10-CM: L03.115  ICD-9-CM: 682.6  8/23/2019        Foreign body (FB) in soft tissue ICD-10-CM: M79.5  ICD-9-CM: 729.6  8/23/2019        Subcutaneous emphysema ([facility] ICD-10-CM: T79. 7XXA  ICD-9-CM: 958.7  8/23/2019        Uncontrolled type II diabetes mellitus ([facility] ICD-10-CM: E11.65  ICD-9-CM: 250.02  8/23/2019        Personal history of noncompliance with medical treatment ICD-10-CM: Z91.19  ICD-9-CM: V15.81  8/23/2019        Hyponatremia ICD-10-CM: E87.1  ICD-9-CM: 276.1  8/23/2019            Principal Problem:    Diabetic infection of right foot ([facility] (8/24/2019)    Active Problems: Cellulitis of right leg (8/23/2019)      Foreign body (FB) in soft tissue (8/23/2019)      Subcutaneous emphysema ([facility] (8/23/2019)      Uncontrolled type II diabetes mellitus ([facility] (8/23/2019)      Personal history of noncompliance with medical treatment (8/23/2019)      Hyponatremia (8/23/2019)      Sepsis ([facility] (8/24/2019)         Diabetic right foot infection - limb threatening  S/p right 5th toe amputation and distal plantar forefoot debridement with extraction of glass (FB)  OR cultures pending  Cont IV Zosyn/Vanc; Pharmacy dosing Vancomycin    He may lose his right foot  I recommend SNF placement for 1-2 months  He needs to be complete non-weight bearing for right foot   and he won't be complaint with treatment (diabetic, dressings, off-loading, etc...)  if we send him home as he has no family or support    PVD  Evaluate with non-invasive arterial studies I have ordered    Uncontrolled DM  Inpt diabetic control  Sliding scale and semiquants    Non-Compliance with outpt DM regimen  Encourage compliance  Risks of non-compliance discussed including amputation and early death            I have personally performed a face-to-face diagnostic evaluation and management  service on this patient. I have independently seen the patient. I have independently obtained the above history from the patient/family. I have independently examined the patient with above findings. I have independently reviewed data/labs for this patient and developed the above plan of care (MDM). Signed: Vika Carlson.  Stevie Cogan, MD, FACS

## 2019-08-26 NOTE — PROGRESS NOTES
Problem: Mobility Impaired (Adult and Pediatric)  Goal: *Acute Goals and Plan of Care (Insert Text)  Description  DISCHARGE GOALS :  (1.)Mr. Nancy Shepard will move from supine to sit and sit to supine  with MODIFIED INDEPENDENCE. (2.)Mr. Nancy Shepard will transfer from bed to chair and chair to bed with SUPERVISION using a walker NWB RIGHT LE. PLAN TO SET AMBULATION & STAIR GOALS WHEN SURGEON ALLOWS PATIENT TO USE AN OFF LOADING BOOT TO SAFELY ALLOW PT TO ATTEMPT THIS ACTIVITY SAFELY. ________________________________________________________________________________________________   Outcome: Progressing Towards Goal     PHYSICAL THERAPY: Initial Assessment and AM 8/26/2019  INPATIENT: PT Visit Days : 1  Payor: SELF PAY / Plan: [facility] SELF PAY / Product Type: Self Pay /       NAME/AGE/GENDER: Hugo Varner is a 49 y.o. male   PRIMARY DIAGNOSIS: Cellulitis of right leg [L03.115] Diabetic infection of right foot ([address]   Diabetic infection of right foot ([address]    Procedure(s) (LRB):  Right foot debridment, removal foreign body, right 5th toe amputation  (Right)  1 Day Post-Op  ICD-10: Treatment Diagnosis:    Generalized Muscle Weakness (M62.81)  Other lack of cordination (R27.8)   Precaution/Allergies:  Patient has no known allergies. ASSESSMENT:     Mr. Nancy Shepard presents with weakness & impaired ability to transfer safely while keeping NWB on right LE. Need to have surgeon give OK to use an off loading boot to attempt gait & stair ambulation safely, Hospitalist & MSW made aware of this concern & that pt has no assist at home along with 5 steps at entry.     This section established at most recent assessment   PROBLEM LIST (Impairments causing functional limitations):  Decreased Strength  Decreased ADL/Functional Activities  Decreased Transfer Abilities  Decreased Ambulation Ability/Technique  Decreased Balance  Decreased Activity Tolerance   INTERVENTIONS PLANNED: (Benefits and precautions of physical therapy have been discussed with the patient.)  Balance Exercise  Bed Mobility  Therapeutic Activites  Therapeutic Exercise/Strengthening  Transfer Training     TREATMENT PLAN: Frequency/Duration: daily for duration of hospital stay  Rehabilitation Potential For Stated Goals: Good     REHAB RECOMMENDATIONS (at time of discharge pending progress):    Placement: It is my opinion, based on this patient's performance to date, that Mr. Hannah Barreto may benefit from intensive therapy at an [address] [place] [address] after discharge due to potential to make ongoing and sustainable functional improvement that is of practical value. .  Equipment: To be determined               HISTORY:   History of Present Injury/Illness (Reason for Referral):  Patient is a 49years old male with history of NDDM and non complicance presents with redness, swelling, pain of R foot and low grade fever for last 5 days. He do not recall injuries but he walks bare foot not infrequently. He has no PCP, on no medications for years despite his need for low dose insulin 2-3 y ago. Past Medical History/Comorbidities:   Mr. Hannah Barreto  has a past medical history of Diabetes ([facility] Mr. Hannah Barreto  has a past surgical history that includes hx cholecystectomy. Social History/Living Environment:   Home Environment: Private residence  # Steps to Enter: 5  Rails to Enter: Yes  Hand Rails : Right  One/Two Story Residence: One story  Living Alone: Yes(Room mate but alone during the day)  Support Systems: Friends \ neighbors  Patient Expects to be Discharged to[de-identified] Private residence  Current DME Used/Available at Home: None  Prior Level of Function/Work/Activity:  Pt was independent without an assistive device prior to this admission. Personal Factors:           Other factors that influence how disability is experienced by the patient:  current & PMH    Number of Personal Factors/Comorbidities that affect the Plan of Care: 3+: HIGH COMPLEXITY   EXAMINATION:   Most Recent Physical Functioning:   Gross Assessment:  AROM: Generally decreased, functional(left LE & both UE's)  Strength: Generally decreased, functional(left LE & both UE's)  Coordination: Generally decreased, functional(left LE & both UE's)               Posture:     Balance:  Sitting: Intact; Without support  Standing: Impaired; With support(walker) Bed Mobility:  Supine to Sit: Stand-by assistance  Sit to Supine: (NT)  Scooting: Stand-by assistance  Wheelchair Mobility:     Transfers:  Sit to Stand: Minimum assistance  Stand to Sit: Minimum assistance  Bed to Chair: Minimum assistance(with walker)  Gait:         Functional Mobility:         Transfers:  min        Bed Mobility:  sba   Body Structures Involved:  Metabolic  Muscles Body Functions Affected: Movement Related  Metobolic/Endocrine Activities and Participation Affected:  General Tasks and Demands  Mobility   Number of elements that affect the Plan of Care: 4+: HIGH COMPLEXITY   CLINICAL PRESENTATION:   Presentation: Evolving clinical presentation with unstable and unpredictable characteristics: HIGH COMPLEXITY   CLINICAL DECISION MAKIN [facility] Inpatient Short Form  How much difficulty does the patient currently have. .. Unable A Lot A Little None   1. Turning over in bed (including adjusting bedclothes, sheets and blankets)? ? 1   ? 2   ? 3   ? 4   2. Sitting down on and standing up from a chair with arms ( e.g., wheelchair, bedside commode, etc.)   ? 1   ? 2   ? 3   ? 4   3. Moving from lying on back to sitting on the side of the bed?   ? 1   ? 2   ? 3   ? 4   How much help from another person does the patient currently need. .. Total A Lot A Little None   4. Moving to and from a bed to a chair (including a wheelchair)? ? 1   ? 2   ? 3   ? 4   5. Need to walk in hospital room? ? 1   ? 2   ? 3   ? 4   6. Climbing 3-5 steps with a railing?    ? 1   ? 2   ? 3   ? 4   © , Trustees of [address] [place] [address] [address] [address], under license to [employer]. All rights reserved      Score:  Initial: 14 Most Recent: X (Date: -- )    Interpretation of Tool:  Represents activities that are increasingly more difficult (i.e. Bed mobility, Transfers, Gait). Medical Necessity:     Patient is expected to demonstrate progress in strength, balance, coordination and functional technique   to decrease assistance required with bed mobility & transfers. .  Reason for Services/Other Comments:  Patient continues to require skilled intervention due to pt being unsafe with functional mobility   . Use of outcome tool(s) and clinical judgement create a POC that gives a: Difficult prediction of patient's progress: HIGH COMPLEXITY            TREATMENT:   (In addition to Assessment/Re-Assessment sessions the following treatments were rendered)   Pre-treatment Symptoms/Complaints:  weakness  Pain: Initial: numeric scale  Pain Intensity 1: 0  Post Session: 0/10     Therapeutic Activity: (    12 min ( extra time to work through activity noted): Therapeutic activities including repeated transfers to chair & BSC for practice with walker & NWB right LE  to improve mobility, strength, balance, coordination and dynamic movement of arm - bilateral and leg - left to improve functional stability during transfers . Assessment/ 11 min    Braces/Orthotics/Lines/Etc:   IV  Treatment/Session Assessment:    Response to Treatment:  pt concerned of being able to perform transfers independently  Interdisciplinary Collaboration:   Registered Nurse  Physician    Rehabilitation Attendant  After treatment position/precautions:   Up in chair  Bed/Chair-wheels locked  Call light within reach  RN notified   Compliance with Program/Exercises: Will assess as treatment progresses  Recommendations/Intent for next treatment session: \"Next visit will focus on reduction in assistance provided\".   Total Treatment Duration:  PT Patient Time In/Time Out  Time In: 1111  Time Out: [facility] [place] Frank

## 2019-08-26 NOTE — PROGRESS NOTES
Interdisciplinary team rounds were held 8/26/2019 with the following team members:Care Management, Nursing, Nutrition, Pharmacy, Physical Therapy and Physician. Plan of care discussed. See clinical pathway and/or care plan for interventions and desired outcomes.

## 2019-08-26 NOTE — PROGRESS NOTES
Resting quietly, awake, resp even, unlab, skin warm, dry. Assessed as noted. Right foot dsg intact with areas of serosanguinous drainage noted, dsg reinforced, foot elevated on pillow. Denies any pain, reporting comfortable. Agrees to call for asst to be out of bed. No distress.

## 2019-08-26 NOTE — PROGRESS NOTES
Continues to rest quietly, resp even, unlab with no distress noted during report given to Ignacio Bernstein RN.

## 2019-08-26 NOTE — DIABETES MGMT
Patient s/p right 5th toe amputation, I & D, and removal of glass from foot, seen by diabetes educator. Patient states he has a family history of diabetes and was told he had diabetes around  and has been on and off various diabetic medications, oral and insulin for years since then. Patient estimates the last time he took anything for diabetes was probably . Patient states he knows how to use insulin pens and vials and syringes and glucometers. Patient has no diabetic supplies at home at this time. Patient states he has lost weight. \" I was a lot heavier before. \" Patient also admits he was drinking lots of sweetened beverages prior to admission. \"Like Gatorades, Powerades and sodas. ..several a day. \" Blood glucose 337 on admission. A1c 9.7 (eA). Blood glucose ranged 133-178 yesterday with patient receiving Lantus 10 units and Humalog 7 units. Blood glucose 125-152 so far today. Patient states he doesn't overeat. \"I don't even order every meal here/ I was like if I am not hungry why should I order food? \"Educated patient that his body will require so much nutrition to aid in wound healing. \"Oh, maybe that's why. \"     Pt given educational material, \"Diabetes Self-Management: A Patient Teaching Guide\", which was reviewed with pt. Explained basic physiology of diabetes, as well as causes, s/s, and treatments for hypoglycemia and hyperglycemia. Described the effects of poor glycemic control on the development of long-term complications such as renal, eye, nerve, and cardiovascular disease. Described proper diabetic foot care and the importance of checking feet qd. Educated re: effects of carbohydrates on blood glucose, the \"plate method\" of healthy meal planning, basics of healthy meal plan, and Consistent Carbohydrate Diet. Also explained the relationship between hyperglycemia and infection. Discussed target goals for blood glucose and A1C.  Stressed importance of controlling diabetes to aid in wound healing and reduce infection risk. Pt verbalizes understanding of teaching. Patient was working at [employer], but currently does not work and is uninsured. Will follow along and provide education as needed. Patient voices no questions at this time. Encouraged patient to be compliant with regimen and to work on lifestyle modifications at discharge as cleared by MD. Per surgery note patient is at risk for losing his foot.

## 2019-08-26 NOTE — CONSULTS
Infectious Disease Consult    Today's Date: 8/26/2019   Admit Date: 8/23/2019    Impression:   · R foot diabetic foot infection, foreign body (glass present), s/p debridement of infected tissue and 5th toe amputation, cultures with gram positive cocci, ID pending, preliminary MRI report without definite evidence of osteomyelitis  · Blood cultures positive for alpha hemolytic streptococcus, ID pending, most likely originating from foot infection    Plan:   ·  Await final culture results  · Continue vancomycin and Zosyn at present, adjust as indicated  · Tetanus booster  · Will need at least 10-14 days IV antibiotics due to bacteremia. Likely not a good outpatient IV antibiotic candidate due to social circumstances. Anti-infectives:   · Vancomycin  · Zosyn    Subjective:   Date of Consultation:  August 26, 2019  Referring Physician: Alethea Bowman    Patient is a 49 y.o. male with diabetes, not on treatment presents with infection of R forefoot associated with low grade fever. He was taken to the operating room yesterday for extensive debridement of infected tissue and amputation of 5th toe. A piece of glass was found in the wound. Patient Active Problem List   Diagnosis Code    Cellulitis of right leg L03.115    Foreign body (FB) in soft tissue M79.5    Subcutaneous emphysema ([address] T79. 7XXA    Uncontrolled type II diabetes mellitus ([address] E11.65    Personal history of noncompliance with medical treatment Z91.19    Hyponatremia E87.1    Diabetic infection of right foot (HCC) E11.628, L08.9    Sepsis ([address] A41.9     Past Medical History:   Diagnosis Date    Diabetes ([address]       History reviewed. No pertinent family history.    Social History     Tobacco Use    Smoking status: Current Every Day Smoker   Substance Use Topics    Alcohol use: No     Past Surgical History:   Procedure Laterality Date    HX CHOLECYSTECTOMY        Prior to Admission medications    Not on File       No Known Allergies     Review of Systems:  A comprehensive review of systems was negative except for that written in the History of Present Illness. Objective:     Visit Vitals  /82 (BP 1 Location: Left arm, BP Patient Position: At rest;Supine)   Pulse 62   Temp 98 °F (36.7 °C)   Resp 18   Ht 5' 9\" (1.753 m)   Wt 95.3 kg (210 lb)   SpO2 97%   BMI 31.01 kg/m²     Temp (24hrs), Av.9 °F (36.6 °C), Min:96.1 °F (35.6 °C), Max:98.8 °F (37.1 °C)       Lines:      Physical Exam:    General:  Alert, cooperative, well noursished, well developed, appears stated age   Eyes:  Sclera anicteric. Pupils equally round and reactive to light.    Neck: Supple   Lungs:   good effort   Abdomen:   non-distended   Extremities: R forefoot plantar surface debrided to healthy appearing tissue   Skin: See above   Musculoskeletal: See above   Lines/Devices:  Intact, no erythema, drainage or tenderness   Psych: Alert and oriented, normal mood affect given the setting       Data Review:     CBC:  Recent Labs     19   WBC 9.1 11.1 14.2*   GRANS 72 78  --    MONOS 7 6  --    EOS 1 1  --    ANEU 6.6 8.7*  --    ABL 1.7 1.5  --    HGB 11.6* 12.3* 12.7*   HCT 34.7* 36.6* 37.3*    145* 161       BMP:  Recent Labs     194   CREA 1.09 1.17 1.20   BUN 15 13 10    134* 131*   K 4.1 3.2* 3.6    102 96*   CO2 26 24 28   AGAP 7 8 7   * 157* 174*       LFTS:  Recent Labs     19  1754   TBILI 1.5*   ALT 14   SGOT 10*      TP 7.8   ALB 3.4*       Microbiology:     All Micro Results     Procedure Component Value Units Date/Time    CULTURE, Estela Hung STAIN [[id number]]  (Abnormal) Collected:  19    Order Status:  Completed Specimen:  Wound from Foot Updated:  19 0818     Special Requests: RIGHT        GRAM STAIN PENDING     Culture result:       LIGHT GRAM POSITIVE COCCI SUBCULTURE IN PROGRESS          CULTURE, BLOOD [[id number]]  (Abnormal) Collected:  08/23/19 1840    Order Status:  Completed Specimen:  Blood Updated:  08/26/19 0642     Special Requests: --        LEFT  ARM       GRAM STAIN GRAM POSITIVE COCCI         ANAEROBIC BOTTLE POSITIVE               RESULTS VERIFIED, PHONED TO AND READ BACK BY Teagan Downs RN 08/24/2019 AT 1 ELF           Culture result: ALPHA STREPTOCOCCUS               CULTURE IN [address] UPDATES TO FOLLOW          CULTURE, BLOOD [[id number]]  (Abnormal) Collected:  08/23/19 1840    Order Status:  Completed Specimen:  Blood Updated:  08/26/19 0637     Special Requests: --        RIGHT  Antecubital       GRAM STAIN GRAM POSITIVE COCCI         ANAEROBIC BOTTLE POSITIVE               RESULTS VERIFIED, PHONED TO AND READ BACK BY Teagan Downs RN 08/24/2019 AT 1428 ELF           Culture result:       ALPHA STREPTOCOCCUS IDENTIFICATION AND SUSCEPTIBILITY TO FOLLOW            STREPTOCOCCUS SPECIES DETECTED Test Performed by Multiplex PCR            RESULTS VERIFIED, PHONED TO AND READ BACK BY  AIMEE NEVAREZ RN ON 08/25/19 @0614, ADS      CULTURE, ANAEROBIC [[id number]] Collected:  08/25/19 0830    Order Status:  Completed Specimen:  Wound Drainage Updated:  08/25/19 1126          Imaging:   MRI foot preliminary report    Signed By: Lorene Aleman MD     August 26, 2019

## 2019-08-26 NOTE — PROGRESS NOTES
Problem: Self Care Deficits Care Plan (Adult)  Goal: *Acute Goals and Plan of Care (Insert Text)  Description  Patient will complete lower body dressing with supervision to increase self care independence. Patient will complete toileting transfer with supervision and NWB RLE. Patient will complete bathing with supervision to increase self care independence. Patient will complete all functional transfers with supervision and NWB RLE    Timeframe: 7 visits          OCCUPATIONAL THERAPY: Initial Assessment and Daily Note 8/26/2019  INPATIENT: OT Visit Days: 1  NWB RLE  Payor: SELF PAY / Plan: [facility] SELF PAY / Product Type: Self Pay /      NAME/AGE/GENDER: Damián Monroe is a 49 y.o. male   PRIMARY DIAGNOSIS:  Cellulitis of right leg [W79.087] Diabetic infection of right foot ([address]   Diabetic infection of right foot ([address]    Procedure(s) (LRB):  Right foot debridment, removal foreign body, right 5th toe amputation  (Right)  1 Day Post-Op  ICD-10: Treatment Diagnosis:    Difficulty in walking, Not elsewhere classified (R26.2)   Precautions/Allergies:     Patient has no known allergies. ASSESSMENT:     Mr. Leigha Esqueda presents supine in bed. NWB RLE. Patient is below baseline due to decreased balance and stnregth dur to R foot infection and WB restrictions. He was able to SPT from bed to recliner using a RW, but was not able to maintain NWB the entire time, at times he had his heel on the ground, he did not put midfoot or toes on the ground. Extra time explaining importance of NWB, patient with partial understanding. He also has some cognitive limitation when following verbal instructions during transfers, he does work hard. He will need continued OT for ADL's and transfers during ADL's. Could benefit from short term intensive rehab. Initiate OT POC.      This section established at most recent assessment   PROBLEM LIST (Impairments causing functional limitations):  Decreased Strength  Decreased ADL/Functional Activities  Decreased Transfer Abilities  Decreased Balance  Increased Pain  Decreased Activity Tolerance  Decreased Cognition   INTERVENTIONS PLANNED: (Benefits and precautions of occupational therapy have been discussed with the patient.)  Activities of daily living training  Neuromuscular re-eduation  Therapeutic activity  Therapeutic exercise     TREATMENT PLAN: Frequency/Duration: Follow patient 3x a week to address above goals. Rehabilitation Potential For Stated Goals: Good     REHAB RECOMMENDATIONS (at time of discharge pending progress):    Placement: It is my opinion, based on this patient's performance to date, that Mr. Marj Stevenson may benefit from participating in 1-2 additional therapy sessions in order to continue to assess for rehab potential and then make recommendation for disposition at discharge. Equipment:   None at this time              OCCUPATIONAL PROFILE AND HISTORY:   History of Present Injury/Illness (Reason for Referral):  See H&P  Past Medical History/Comorbidities:   Mr. Marj Stevenson  has a past medical history of Diabetes ([facility] Mr. Marj Stevenson  has a past surgical history that includes hx cholecystectomy. Social History/Living Environment:   Home Environment: Private residence  One/Two Story Residence: One story  Living Alone: No  Support Systems: Friends \ neighbors  Patient Expects to be Discharged to[de-identified] Private residence  Current DME Used/Available at Home: None  Prior Level of Function/Work/Activity:  Lives with a roommate. Was independent all adl's and transfers.       Number of Personal Factors/Comorbidities that affect the Plan of Care: Brief history (0):  LOW COMPLEXITY   ASSESSMENT OF OCCUPATIONAL PERFORMANCE[de-identified]   Activities of Daily Living:   Basic ADLs (From Assessment) Complex ADLs (From Assessment)   Feeding: Independent  Oral Facial Hygiene/Grooming: Independent  Bathing: Minimum assistance  Upper Body Dressing: Setup  Lower Body Dressing: Contact guard assistance  Toileting: Contact guard assistance     Grooming/Bathing/Dressing Activities of Daily Living                       Functional Transfers  Toilet Transfer : Contact guard assistance     Bed/Mat Mobility  Supine to Sit: Stand-by assistance  Sit to Stand: Contact guard assistance  Stand to Sit: Contact guard assistance  Scooting: Stand-by assistance     Most Recent Physical Functioning:   Gross Assessment:                  Posture:     Balance:  Sitting: Intact  Standing: With support Bed Mobility:  Supine to Sit: Stand-by assistance  Scooting: Stand-by assistance  Wheelchair Mobility:     Transfers:  Sit to Stand: Contact guard assistance  Stand to Sit: Contact guard assistance            Patient Vitals for the past 6 hrs:   BP BP Patient Position SpO2 Pulse   19 0754 164/82 At rest;Supine 97 % 62       Mental Status  Neurologic State: Alert  Orientation Level: Oriented X4  Cognition: Appropriate decision making                          Physical Skills Involved:  Range of Motion  Balance  Strength  Activity Tolerance  Gross Motor Control  Skin Integrity Cognitive Skills Affected (resulting in the inability to perform in a timely and safe manner):  Perception  Command followign  Psychosocial Skills Affected:  Environmental Adaptation  Self-Awareness   Number of elements that affect the Plan of Care: 3-5:  MODERATE COMPLEXITY   CLINICAL DECISION MAKIN [facility] [address] [address] -PAC 6 Clicks   Daily Activity Inpatient Short Form  How much help from another person does the patient currently need. .. Total A Lot A Little None   1. Putting on and taking off regular lower body clothing? ? 1   ? 2   ? 3   ? 4   2. Bathing (including washing, rinsing, drying)? ? 1   ? 2   ? 3   ? 4   3. Toileting, which includes using toilet, bedpan or urinal?   ? 1   ? 2   ? 3   ? 4   4. Putting on and taking off regular upper body clothing? ? 1   ? 2   ? 3   ? 4   5. Taking care of personal grooming such as brushing teeth? ? 1   ? 2   ? 3   ? 4   6.  Eating meals? ? 1   ? 2   ? 3   ? 4   © 2007, Trustees of [address] [place] [address] [address] [address], under license to [employer]. All rights reserved      Score:  Initial: 17 Most Recent: X (Date: -- )    Interpretation of Tool:  Represents activities that are increasingly more difficult (i.e. Bed mobility, Transfers, Gait). Medical Necessity:     Skilled intervention continues to be required due to Deficits listed abvoe. Reason for Services/Other Comments:  Patient continues to require skilled intervention due to right foot infection and NWB   . Use of outcome tool(s) and clinical judgement create a POC that gives a: MODERATE COMPLEXITY         TREATMENT:   (In addition to Assessment/Re-Assessment sessions the following treatments were rendered)     Pre-treatment Symptoms/Complaints:    Pain: Initial:   Pain Intensity 1: 0  Post Session:  0     Self Care: (10): Procedure(s) (per grid) utilized to improve and/or restore self-care/home management as related to dressing and toileting. Required moderate visual, verbal and tactile cueing to facilitate activities of daily living skills. Initial evaluation 15 mintues. Braces/Orthotics/Lines/Etc:   O2 Device: Room air  Treatment/Session Assessment:    Response to Treatment:  good, back in bed, fresh gown. Interdisciplinary Collaboration:   Physical Therapist  Occupational Therapist  Registered Nurse  After treatment position/precautions:   Supine in bed  Bed/Chair-wheels locked  Bed in low position  Side rails x 2   Compliance with Program/Exercises: Compliant all of the time, Will assess as treatment progresses. Recommendations/Intent for next treatment session: \"Next visit will focus on advancements to more challenging activities and reduction in assistance provided\".   Total Treatment Duration:  OT Patient Time In/Time Out  Time In: 0940  Time Out: 503 [address], OT

## 2019-08-27 LAB
ANION GAP SERPL CALC-SCNC: 5 MMOL/L (ref 7–16)
BACTERIA SPEC CULT: ABNORMAL
BASOPHILS # BLD: 0.1 K/UL (ref 0–0.2)
BASOPHILS NFR BLD: 1 % (ref 0–2)
BUN SERPL-MCNC: 13 MG/DL (ref 6–23)
CALCIUM SERPL-MCNC: 7.9 MG/DL (ref 8.3–10.4)
CHLORIDE SERPL-SCNC: 105 MMOL/L (ref 98–107)
CO2 SERPL-SCNC: 27 MMOL/L (ref 21–32)
CREAT SERPL-MCNC: 0.96 MG/DL (ref 0.8–1.5)
DIFFERENTIAL METHOD BLD: ABNORMAL
EOSINOPHIL # BLD: 0.2 K/UL (ref 0–0.8)
EOSINOPHIL NFR BLD: 2 % (ref 0.5–7.8)
ERYTHROCYTE [DISTWIDTH] IN BLOOD BY AUTOMATED COUNT: 12.9 % (ref 11.9–14.6)
GLUCOSE BLD STRIP.AUTO-MCNC: 134 MG/DL (ref 65–100)
GLUCOSE BLD STRIP.AUTO-MCNC: 167 MG/DL (ref 65–100)
GLUCOSE BLD STRIP.AUTO-MCNC: 180 MG/DL (ref 65–100)
GLUCOSE BLD STRIP.AUTO-MCNC: 95 MG/DL (ref 65–100)
GLUCOSE SERPL-MCNC: 98 MG/DL (ref 65–100)
GRAM STN SPEC: ABNORMAL
HCT VFR BLD AUTO: 32.6 % (ref 41.1–50.3)
HGB BLD-MCNC: 11 G/DL (ref 13.6–17.2)
IMM GRANULOCYTES # BLD AUTO: 0.1 K/UL (ref 0–0.5)
IMM GRANULOCYTES NFR BLD AUTO: 1 % (ref 0–5)
LYMPHOCYTES # BLD: 1.4 K/UL (ref 0.5–4.6)
LYMPHOCYTES NFR BLD: 16 % (ref 13–44)
MCH RBC QN AUTO: 28.6 PG (ref 26.1–32.9)
MCHC RBC AUTO-ENTMCNC: 33.7 G/DL (ref 31.4–35)
MCV RBC AUTO: 84.9 FL (ref 79.6–97.8)
MONOCYTES # BLD: 0.6 K/UL (ref 0.1–1.3)
MONOCYTES NFR BLD: 7 % (ref 4–12)
NEUTS SEG # BLD: 6.5 K/UL (ref 1.7–8.2)
NEUTS SEG NFR BLD: 74 % (ref 43–78)
NRBC # BLD: 0 K/UL (ref 0–0.2)
PLATELET # BLD AUTO: 183 K/UL (ref 150–450)
PMV BLD AUTO: 10.4 FL (ref 9.4–12.3)
POTASSIUM SERPL-SCNC: 3.7 MMOL/L (ref 3.5–5.1)
RBC # BLD AUTO: 3.84 M/UL (ref 4.23–5.6)
SERVICE CMNT-IMP: ABNORMAL
SODIUM SERPL-SCNC: 137 MMOL/L (ref 136–145)
VANCOMYCIN TROUGH SERPL-MCNC: 21.9 UG/ML (ref 5–20)
WBC # BLD AUTO: 8.8 K/UL (ref 4.3–11.1)

## 2019-08-27 PROCEDURE — 74011000258 HC RX REV CODE- 258: Performed by: SURGERY

## 2019-08-27 PROCEDURE — 02HV33Z INSERTION OF INFUSION DEVICE INTO SUPERIOR VENA CAVA, PERCUTANEOUS APPROACH: ICD-10-PCS | Performed by: INTERNAL MEDICINE

## 2019-08-27 PROCEDURE — 36573 INSJ PICC RS&I 5 YR+: CPT | Performed by: INTERNAL MEDICINE

## 2019-08-27 PROCEDURE — 74011250636 HC RX REV CODE- 250/636: Performed by: SURGERY

## 2019-08-27 PROCEDURE — 80048 BASIC METABOLIC PNL TOTAL CA: CPT

## 2019-08-27 PROCEDURE — C1751 CATH, INF, PER/CENT/MIDLINE: HCPCS

## 2019-08-27 PROCEDURE — 85025 COMPLETE CBC W/AUTO DIFF WBC: CPT

## 2019-08-27 PROCEDURE — 36415 COLL VENOUS BLD VENIPUNCTURE: CPT

## 2019-08-27 PROCEDURE — 74011250636 HC RX REV CODE- 250/636: Performed by: INTERNAL MEDICINE

## 2019-08-27 PROCEDURE — 74011636637 HC RX REV CODE- 636/637: Performed by: SURGERY

## 2019-08-27 PROCEDURE — 82962 GLUCOSE BLOOD TEST: CPT

## 2019-08-27 PROCEDURE — 97110 THERAPEUTIC EXERCISES: CPT

## 2019-08-27 PROCEDURE — 65660000000 HC RM CCU STEPDOWN

## 2019-08-27 PROCEDURE — 97530 THERAPEUTIC ACTIVITIES: CPT

## 2019-08-27 PROCEDURE — 74011250637 HC RX REV CODE- 250/637: Performed by: SURGERY

## 2019-08-27 PROCEDURE — 80202 ASSAY OF VANCOMYCIN: CPT

## 2019-08-27 PROCEDURE — 74011250637 HC RX REV CODE- 250/637: Performed by: HOSPITALIST

## 2019-08-27 RX ORDER — HEPARIN 100 UNIT/ML
600 SYRINGE INTRAVENOUS EVERY 8 HOURS
Status: DISCONTINUED | OUTPATIENT
Start: 2019-08-27 | End: 2019-09-04 | Stop reason: HOSPADM

## 2019-08-27 RX ORDER — SODIUM CHLORIDE 0.9 % (FLUSH) 0.9 %
10 SYRINGE (ML) INJECTION AS NEEDED
Status: DISCONTINUED | OUTPATIENT
Start: 2019-08-27 | End: 2019-09-04 | Stop reason: HOSPADM

## 2019-08-27 RX ORDER — SODIUM CHLORIDE 0.9 % (FLUSH) 0.9 %
10 SYRINGE (ML) INJECTION EVERY 8 HOURS
Status: DISCONTINUED | OUTPATIENT
Start: 2019-08-27 | End: 2019-09-04 | Stop reason: HOSPADM

## 2019-08-27 RX ORDER — HEPARIN 100 UNIT/ML
600 SYRINGE INTRAVENOUS AS NEEDED
Status: DISCONTINUED | OUTPATIENT
Start: 2019-08-27 | End: 2019-09-04 | Stop reason: HOSPADM

## 2019-08-27 RX ADMIN — Medication 5 ML: at 12:42

## 2019-08-27 RX ADMIN — PIPERACILLIN SODIUM,TAZOBACTAM SODIUM 3.38 G: 3; .375 INJECTION, POWDER, FOR SOLUTION INTRAVENOUS at 11:13

## 2019-08-27 RX ADMIN — INSULIN GLARGINE 10 UNITS: 100 INJECTION, SOLUTION SUBCUTANEOUS at 09:39

## 2019-08-27 RX ADMIN — PIPERACILLIN SODIUM,TAZOBACTAM SODIUM 3.38 G: 3; .375 INJECTION, POWDER, FOR SOLUTION INTRAVENOUS at 03:15

## 2019-08-27 RX ADMIN — HYDROCODONE BITARTRATE AND ACETAMINOPHEN 1 TABLET: 7.5; 325 TABLET ORAL at 07:15

## 2019-08-27 RX ADMIN — CLONIDINE HYDROCHLORIDE 0.1 MG: 0.1 TABLET ORAL at 16:16

## 2019-08-27 RX ADMIN — Medication 10 ML: at 06:08

## 2019-08-27 RX ADMIN — PIPERACILLIN SODIUM,TAZOBACTAM SODIUM 3.38 G: 3; .375 INJECTION, POWDER, FOR SOLUTION INTRAVENOUS at 20:58

## 2019-08-27 RX ADMIN — Medication 10 ML: at 20:59

## 2019-08-27 RX ADMIN — Medication: at 09:41

## 2019-08-27 RX ADMIN — CLONIDINE HYDROCHLORIDE 0.1 MG: 0.1 TABLET ORAL at 07:15

## 2019-08-27 RX ADMIN — INSULIN LISPRO 2 UNITS: 100 INJECTION, SOLUTION INTRAVENOUS; SUBCUTANEOUS at 12:40

## 2019-08-27 RX ADMIN — VANCOMYCIN HYDROCHLORIDE 1000 MG: 1 INJECTION, POWDER, LYOPHILIZED, FOR SOLUTION INTRAVENOUS at 10:00

## 2019-08-27 RX ADMIN — ENOXAPARIN SODIUM 40 MG: 40 INJECTION SUBCUTANEOUS at 07:15

## 2019-08-27 RX ADMIN — VANCOMYCIN HYDROCHLORIDE 1000 MG: 1 INJECTION, POWDER, LYOPHILIZED, FOR SOLUTION INTRAVENOUS at 21:00

## 2019-08-27 NOTE — PROGRESS NOTES
Shift assessment completed via doc flow sheet. Pt A & O x 4. Lungs CTA, HR WNL, NSR. Abdomen soft and non tender. No c/o pain at this time. Dressing to right foot reinforced. IVS c/d/i, no s/sx of infection/infiltration noted. Pt able to make needs known. No concerns at this time. Bed low and locked. Call light within reach. Will continue to monitor.

## 2019-08-27 NOTE — PROGRESS NOTES
Infectious Disease Progress Note    Today's Date: 2019   Admit Date: 2019    Impression:   · L Diabetic foot infection/osteomyelitis , s/p debridement, wound culture with alpha strep, staph  · Streptococcus anginosus bacteremia secondary to #1, TTE negative    Plan:   · Continue vancomycin and Zosyn pending final wound cult ID  · Place PICC  · Discussed with case management. Patient with Medicaid pending, they will try to get admission to SNF approved. Can discuss changing to less frequently dosed regimen if necessary for SNF. Anti-infectives:   1. Vancomycin  2. Zosyn    Subjective:     Doing well no new complaints  Review of Systems:  A comprehensive review of systems was negative except for that written in the History of Present Illness. Objective:     Visit Vitals  /88 (BP 1 Location: Right arm, BP Patient Position: At rest)   Pulse 63   Temp 97.8 °F (36.6 °C)   Resp 16   Ht 5' 9\" (1.753 m)   Wt 95.3 kg (210 lb)   SpO2 92%   BMI 31.01 kg/m²     Temp (24hrs), Av.7 °F (36.5 °C), Min:96.9 °F (36.1 °C), Max:98.5 °F (36.9 °C)      Lines:  Peripheral IV    Physical Exam:   General:  Alert, cooperative, well noursished, well developed, appears stated age   Eyes:  Sclera anicteric. Pupils equally round and reactive to light. Neck: Supple   Lungs:    good effort   Abdomen:   Soft, non-tender.  bowel sounds normal. non-distended   Extremities: L foot dressed   Skin: Skin color, texture, turgor normal. no acute rash or lesions   Musculoskeletal: No swelling or deformity   Lines/Devices:  Intact, no erythema, drainage or tenderness   Psych: Alert and oriented, normal mood affect given the setting       Data Review:     CBC:   Recent Labs     19  0619  041   WBC 8.8 9.1 11.1   RBC 3.84* 4.06* 4.30   HGB 11.0* 11.6* 12.3*   HCT 32.6* 34.7* 36.6*    187 145*   GRANS 74 72 78   LYMPH 16 19 13   EOS 2 1 1     CMP:   Recent Labs     19 08/25/19  0411   GLU 98 125* 157*    137 134*   K 3.7 4.1 3.2*    104 102   CO2 27 26 24   BUN 13 15 13   CREA 0.96 1.09 1.17   CA 7.9* 8.0* 7.6*   AGAP 5* 7 8     Liver Enzymes: No results for input(s): TP, ALB, TBIL, AP, SGOT, GPT in the last 72 hours. No lab exists for component: DBIL     Microbiology:     All Micro Results     Procedure Component Value Units Date/Time    CULTURE, Lien Grossmanle STAIN [[id number]]  (Abnormal) Collected:  08/25/19 0830    Order Status:  Completed Specimen:  Wound from Foot Updated:  08/27/19 0655     Special Requests: RIGHT        GRAM STAIN 3 TO 18 WBC'S/OIF      MANY GRAM POSITIVE COCCI        Culture result:       LIGHT STAPHYLOCOCCUS SPECIES IDENTIFICATION AND SUSCEPTIBILITY TO FOLLOW                  MODERATE ALPHA STREPTOCOCCUS                  THIS ORGANISM WILL BE HELD FOR 7 DAYS.  IF FURTHER TESTING IS REQUIRED PLEASE NOTIFY MICROBIOLOGY          CULTURE, BLOOD [[id number]]  (Abnormal) Collected:  08/23/19 1840    Order Status:  Completed Specimen:  Blood Updated:  08/27/19 6032     Special Requests: --        LEFT  ARM       GRAM STAIN GRAM POSITIVE COCCI         ANAEROBIC BOTTLE POSITIVE               RESULTS VERIFIED, PHONED TO AND READ BACK BY Zain Okeefe RN 08/24/2019 AT 1428 ELF           Culture result: ALPHA STREPTOCOCCUS         REFER TO [facility] [id number] FOR ID AND SUSCEPTIBILITY    CULTURE, BLOOD [[id number]]  (Abnormal)  (Susceptibility) Collected:  08/23/19 1840    Order Status:  Completed Specimen:  Blood Updated:  08/27/19 0631     Special Requests: --        RIGHT  Antecubital       GRAM STAIN GRAM POSITIVE COCCI         ANAEROBIC BOTTLE POSITIVE               RESULTS VERIFIED, PHONED TO AND READ BACK BY Zain Okeefe RN 08/24/2019 AT 1 ELF           Culture result: STREPTOCOCCUS ANGINOSUS         STREPTOCOCCUS SPECIES DETECTED Test Performed by Multiplex PCR            RESULTS VERIFIED, PHONED TO AND READ BACK BY  AIMEE NEVAREZ RN ON 08/25/19 [id number], ADS      CULTURE, ANAEROBIC [[id number]] Collected:  08/25/19 0830    Order Status:  Completed Specimen:  Foot Updated:  08/26/19 0928     Special Requests: RIGHT        Culture result:       SUBCULTURE IS NECESSARY TO DETERMINE PRESENCE OR ABSENCE OF ANAEROBIC BACTERIA IN THIS CULTURE. FURTHER REPORT TO FOLLOW AFTER INCUBATION OF SUBCULTURE.                 Imaging:     MRI foot    Signed By: Tim Garcia MD     August 27, 2019

## 2019-08-27 NOTE — PROGRESS NOTES
Hospitalist Progress Note    2019  Admit Date: 2019  6:07 PM   NAME: Michael Mott   :  1965   MRN:  [id number]   Attending: Jose Carlos Truong DO  PCP:  None    SUBJECTIVE:   Patient is a 49years old male with history of NDDM and non complicance presents with redness, swelling, pain of R foot and low grade fever for last 5 days. He do not recall injuries but he walks bare foot not infrequently. He has no PCP, on no medications for years despite his need for low dose insulin 2-3 y ago. Surgical debridement and removal of glass foreign body, and R 5th toe amputation .      : Mery Terry. Surgery and ID following: continue vanc/zosyn and NWB for now. Await cultures. Continues occasional chills, no fevers. No n/v. Minimal foot soreness. Review of Systems negative with exception of pertinent positives noted above  PHYSICAL EXAM     Visit Vitals  /88 (BP 1 Location: Right arm, BP Patient Position: At rest)   Pulse 63   Temp 97.8 °F (36.6 °C)   Resp 16   Ht 5' 9\" (1.753 m)   Wt 95.3 kg (210 lb)   SpO2 92%   BMI 31.01 kg/m²      Temp (24hrs), Av.7 °F (36.5 °C), Min:96.9 °F (36.1 °C), Max:98.5 °F (36.9 °C)    Oxygen Therapy  O2 Sat (%): 92 % (19 0709)  O2 Device: Room air (19 0720)  O2 Flow Rate (L/min): 2 l/min (19 0930)    Intake/Output Summary (Last 24 hours) at 2019 1117  Last data filed at 2019 1000  Gross per 24 hour   Intake 525 ml   Output 700 ml   Net -175 ml      General: No acute distress    Lungs:  CTA Bilaterally.    Heart:  Regular rate and rhythm,  No murmur, rub, or gallop  Abdomen: Soft, Non distended, Non tender, Positive bowel sounds  Extremities: Bandaging in place c/d/i  Neurologic:  No focal deficits    ASSESSMENT      Active Hospital Problems    Diagnosis Date Noted    Diabetic infection of right foot ([address] 2019    Sepsis ([facility] 2019    Cellulitis of right leg 2019    Foreign body (FB) in soft tissue 2019    Subcutaneous emphysema ([facility] 08/23/2019    Uncontrolled type II diabetes mellitus ([facility] 08/23/2019    Personal history of noncompliance with medical treatment 08/23/2019    Hyponatremia 08/23/2019     Plan:    # Sepsis, R diabetic foot infection, strep anginosus bacteremia   - Surgery and ID following. Continue vanc/zosyn. - CM following for protracted antibiotics and PT needs - unlikely to get SNF placement due to self pay status. - Strep anginosus bacteremia, intraop wound cx pending    # Paroxysmal atrial fibrillation  - observed during surgery and likely precipitated by sepsis  - patient spontaneously converted back to NSR  - no current indication for anticoagulation  - ordered cardiac telemetry  - sepsis management, as above    # DM type II, uncontrolled  - cannot afford any medications and does not take anything at home  - case management consult for assistance   - Lantus 10 units qdaily  - Humalog SSI and serial CBGs  - goal CBG between 140 and 180 while inpatient  - diabetes education consultation  - repeat HgbA1c of 9.7, patient would most benefit with starting on insulin regimen after hospitalization    F/E/N: discontinue fluids, replete electrolytes as needed, diabetic diet    Ppx: Lovenox for VTE    Code Status: FULL CODE    Disposition: abx and treatment course pending cultures, ID recs. Surgery desires SNF for NWB but this may not be an option.     Signed By: Jerry Glynn MD     August 27, 2019

## 2019-08-27 NOTE — OP NOTES
[address] [address]  OPERATIVE REPORT    Name:  Partha Hernandez  MR#:  [id number]  :  1965  ACCOUNT #:  [de-identified]  DATE OF SERVICE:  2019    PREOPERATIVE DIAGNOSES:  1. Right diabetic foot infection. 2.  Uncontrolled diabetes. 3.  Medical noncompliance. 4.  Retained foreign body seen on x-ray involving the plantar aspect of the right foot. 5.  Tissue ischemia of multiple right lower extremity toes. 6.  Multiple plantar foot abscesses. POSTOPERATIVE DIAGNOSES:  1. Right diabetic foot infection. 2.  Uncontrolled diabetes. 3.  Medical noncompliance. 4.  Retained foreign body seen on x-ray involving the plantar aspect of the right foot. 5.  Tissue ischemia of multiple right lower extremity toes. 6.  Multiple plantar foot abscesses. PROCEDURE PERFORMED:    1. Incision and drainage of multiple right foot plantar metatarsal bursal abscesses with tendon involvement (CPT 75258). 2.  Left fifth toe amputation at metatarsophalangeal joint (CPT 74425-60). 3.  Extraction of foreign body from deep plantar aspect of the right foot (CPT 58160-21). 4.  Sharp excisional debridement of devitalized skin, subcutaneous adipose, fascia, and tendon from large area of ischemia involving the distal plantar forefoot; 32 sq. cm (CPT 07317-47, 40001+). SURGEON:  Elvia Boyd. Rob Campa MD    ASSISTANT:  None. ANESTHESIA:  General.    COMPLICATIONS:  None. SPECIMENS REMOVED:  Foreign body and right fifth toe sent to Pathology for review. IMPLANTS:  None. ESTIMATED BLOOD LOSS:  Less than 30 mL. OPERATIVE PROCEDURE:  The patient was taken to the operating room, placed in supine position, and after adequate anesthesia was given, his right foot was prepped and draped in a sterile fashion with multiple layers of Betadine scrub and Betadine solution. Time-out protocol was followed. He was on standing antibiotics.   He had a large necrotic plantar foot wound with a retained foreign body on x-ray secondary to neglected diabetes, infection, and medical noncompliance. We performed a large incision and drainage of a large abscess involving the plantar aspect of his foot and multiple metatarsal bursa. Pus was sent to microbiology for culture. The incision was oriented transversely across all of the metatarsals. A lot of pus was evacuated from multiple plantar metatarsal bursal spaces. Following incision and drainage, and evacuation of the pus, we dissected into the deep plantar foot space over the distal aspect of the third metatarsal and encountered a moderate sized triangular shard of glass in the same shape as the x-ray. This was extracted and sent to Pathology for review. The glass was over the 3rd metatarsal bone.     The abscesses were over the 1st, 2nd, 3rd, 4th, and 5th metatarsals. There was focussed dissection for the glass. Huge amount of dissection for the abscess (the whole bottom of the foot was unroofed leaving exposed tendons under all the toes). We then performed sharp excisional debridement of the large area of necrosis involving the distal foot and between the 1st and 2nd toes and approximately 32 sq. cm of devitalized skin, subcutaneous adipose, fascia, and tendon were debrided to remove all of the gangrenous tissues. Irrigation was used. The right fifth toe was not viable. We amputated the right fifth toe at the metatarsophalangeal joint. We irrigated the wound. All of the devitalized tissues had been debrided and all of the pus had been evacuated. We then packed the wound with gauze and wrapped the foot with Kerlix. The patient was taken to Recovery in good condition. At the end of the case, it was clear that we may have to return to the operating room in the next 1 to 4 weeks to amputate additional toes.       Photo below taken approx Razia Oswald MD [id number]  D:  08/26/2019 15:44  T:  08/26/2019 18:54  JOB #:  [id number]

## 2019-08-27 NOTE — PROGRESS NOTES
Problem: Mobility Impaired (Adult and Pediatric)  Goal: *Acute Goals and Plan of Care (Insert Text)  Description  DISCHARGE GOALS :  (1.)Mr. Pastor Lennon will move from supine to sit and sit to supine  with MODIFIED INDEPENDENCE. (2.)Mr. Pastor Lennon will transfer from bed to chair and chair to bed with SUPERVISION using a walker NWB RIGHT LE. PLAN TO SET AMBULATION & STAIR GOALS WHEN SURGEON ALLOWS PATIENT TO USE AN OFF LOADING BOOT TO SAFELY ALLOW PT TO ATTEMPT THIS ACTIVITY SAFELY. ________________________________________________________________________________________________   Outcome: Progressing Towards Goal     PHYSICAL THERAPY: Daily Note and AM 8/27/2019  INPATIENT: PT Visit Days : 2  Payor: SELF PAY / Plan: [facility] SELF PAY / Product Type: Self Pay /       NAME/AGE/GENDER: Esther Kirk is a 49 y.o. male   PRIMARY DIAGNOSIS: Cellulitis of right leg [R81.053] Diabetic infection of right foot ([address]   Diabetic infection of right foot ([address]    Procedure(s) (LRB):  Right foot debridment, removal foreign body, right 5th toe amputation  (Right)  2 Days Post-Op  ICD-10: Treatment Diagnosis:    · Generalized Muscle Weakness (M62.81)  · Other lack of cordination (R27.8)   Precaution/Allergies:  Patient has no known allergies. ASSESSMENT:     Mr. Pastor Lennon presents with weakness & impaired ability to transfer safely while keeping NWB on right LE. Patient needs to be completely NWB R LE per Dr. Daisha Peterson note today. Patient is a little impulsive and unsafe. Needed verbal and manual cues for safety and to maintain NWB R LE during gait and transfers. SW is working on SNF placement. This section established at most recent assessment   PROBLEM LIST (Impairments causing functional limitations):  1. Decreased Strength  2. Decreased ADL/Functional Activities  3. Decreased Transfer Abilities  4. Decreased Ambulation Ability/Technique  5. Decreased Balance  6.  Decreased Activity Tolerance   INTERVENTIONS PLANNED: (Benefits and precautions of physical therapy have been discussed with the patient.)  1. Balance Exercise  2. Bed Mobility  3. Therapeutic Activites  4. Therapeutic Exercise/Strengthening  5. Transfer Training     TREATMENT PLAN: Frequency/Duration: daily for duration of hospital stay  Rehabilitation Potential For Stated Goals: Good     REHAB RECOMMENDATIONS (at time of discharge pending progress):    Placement: It is my opinion, based on this patient's performance to date, that Mr. Gagandeep Cash may benefit from intensive therapy at an [address] [place] [address] after discharge due to potential to make ongoing and sustainable functional improvement that is of practical value. .  Equipment:    To be determined               HISTORY:   History of Present Injury/Illness (Reason for Referral):  Patient is a 49years old male with history of NDDM and non complicance presents with redness, swelling, pain of R foot and low grade fever for last 5 days. He do not recall injuries but he walks bare foot not infrequently. He has no PCP, on no medications for years despite his need for low dose insulin 2-3 y ago. Past Medical History/Comorbidities:   Mr. Gagandeep Cash  has a past medical history of Diabetes ([address] Mr. Gagandeep Cash  has a past surgical history that includes hx cholecystectomy. Social History/Living Environment:   Home Environment: Private residence  # Steps to Enter: 5  Rails to Enter: Yes  Hand Rails : Right  One/Two Story Residence: One story  Living Alone: Yes(Room mate but alone during the day)  Support Systems: Friends \ neighbors  Patient Expects to be Discharged to[de-identified] Private residence  Current DME Used/Available at Home: None  Prior Level of Function/Work/Activity:  Pt was independent without an assistive device prior to this admission. Personal Factors:           Other factors that influence how disability is experienced by the patient:  current & PMH    Number of Personal Factors/Comorbidities that affect the Plan of Care: 3+: HIGH COMPLEXITY   EXAMINATION:   Most Recent Physical Functioning:   Gross Assessment:                  Posture:     Balance:  Sitting: Intact  Standing: With support Bed Mobility:  Supine to Sit: Stand-by assistance  Sit to Supine: (left up in chair)  Wheelchair Mobility:     Transfers:  Sit to Stand: Minimum assistance  Stand to Sit: Minimum assistance  Bed to Chair: Minimum assistance  Gait:  Right Side Weight Bearing: Non-weight bearing  Speed/Joan: Slow  Distance (ft): 10 Feet (ft)  Assistive Device: Walker, rolling  Ambulation - Level of Assistance: Minimal assistance  Interventions: Manual cues; Safety awareness training;Verbal cues   Functional Mobility:         Transfers:  min        Bed Mobility:  sba   Body Structures Involved:  1. Metabolic  2. Muscles Body Functions Affected:  1. Movement Related  2. Metobolic/Endocrine Activities and Participation Affected:  1. General Tasks and Demands  2. Mobility   Number of elements that affect the Plan of Care: 4+: HIGH COMPLEXITY   CLINICAL PRESENTATION:   Presentation: Evolving clinical presentation with unstable and unpredictable characteristics: HIGH COMPLEXITY   CLINICAL DECISION MAKIN [facility] Mobility Inpatient Short Form  How much difficulty does the patient currently have. .. Unable A Lot A Little None   1. Turning over in bed (including adjusting bedclothes, sheets and blankets)? ? 1   ? 2   ? 3   ? 4   2. Sitting down on and standing up from a chair with arms ( e.g., wheelchair, bedside commode, etc.)   ? 1   ? 2   ? 3   ? 4   3. Moving from lying on back to sitting on the side of the bed?   ? 1   ? 2   ? 3   ? 4   How much help from another person does the patient currently need. .. Total A Lot A Little None   4. Moving to and from a bed to a chair (including a wheelchair)? ? 1   ? 2   ? 3   ? 4   5. Need to walk in hospital room? ? 1   ? 2   ? 3   ? 4   6.   Climbing 3-5 steps with a railing? ? 1   ? 2   ? 3   ? 4   © 2007, Trustees of [address] [place] [address] [address] [address], under license to [employer]. All rights reserved      Score:  Initial: 14 Most Recent: X (Date: -- )    Interpretation of Tool:  Represents activities that are increasingly more difficult (i.e. Bed mobility, Transfers, Gait). Medical Necessity:     · Patient is expected to demonstrate progress in strength, balance, coordination and functional technique  ·  to decrease assistance required with bed mobility & transfers. · .  Reason for Services/Other Comments:  · Patient continues to require skilled intervention due to pt being unsafe with functional mobility   · . Use of outcome tool(s) and clinical judgement create a POC that gives a: Difficult prediction of patient's progress: HIGH COMPLEXITY            TREATMENT:   (In addition to Assessment/Re-Assessment sessions the following treatments were rendered)   Pre-treatment Symptoms/Complaints:  weakness  Pain: Initial: numeric scale  Pain Intensity 1: 0  Post Session: 0/10     Therapeutic Activity: (    13 min ):  Therapeutic activities including bed mobility, transfers, ambulation on level ground  with walker & NWB right LE  to improve mobility, strength, balance, coordination and dynamic movement of arm - bilateral and leg - left to improve functional stability during transfers . Therapeutic Exercise: (12 Minutes):  Exercises per grid below to improve mobility and strength. Required minimal verbal cues to perform exercises correctly.    Date:  8/27/19 Date:   Date:     Activity/Exercise Parameters Parameters Parameters   Ankle pumps 10     Seated marching 10     Long arc quads 10     Hip abduction/adduction 10     SLR 10                       Braces/Orthotics/Lines/Etc:   · IV  Treatment/Session Assessment:    · Response to Treatment:  Tolerated fairly well  · Interdisciplinary Collaboration:   o Physical Therapist  o Registered Nurse  · After treatment position/precautions:   o Up in chair  o Bed/Chair-wheels locked  o Call light within reach  o RN notified   · Compliance with Program/Exercises: Compliant most of the time, Will assess as treatment progresses  · Recommendations/Intent for next treatment session: \"Next visit will focus on reduction in assistance provided\".   Total Treatment Duration:  PT Patient Time In/Time Out  Time In: 1015  Time Out: Denise [id number], PT

## 2019-08-27 NOTE — PROGRESS NOTES
PICC Placement Note    PRE-PROCEDURE VERIFICATION  Correct Procedure: yes. Time out completed with assistant Brenda Hernandez RN, VAT and all persons present in agreement with time out. Correct Site:  yes  Temperature: Temp: 96.8 °F (36 °C), Temperature Source: Temp Source: Oral  Recent Labs     08/27/19  0601   BUN 13   CREA 0.96      WBC 8.8     Allergies: Patient has no known allergies. Education materials for [facility] given to patient or family. PROCEDURE DETAIL  A double lumen PICC line was started for antibiotic therapy. The following documentation is in addition to the PICC properties in the lines/airways flowsheet :  Lot #: [id number]  xylocaine used: yes  Mid-Arm Circumference: 28 (cm)  Internal Catheter Length: 38 (cm)  Internal Catheter Total Length: 38 (cm)  Vein Selection for PICC:right brachial  Central Line Bundle followed yes  Complication Related to Insertion: none  Both the insertion guidewire and ECG guidewire were removed intact all ports have positive blood return and were flush well with normal saline. The location of the tip of the PICC is verified using ECG technology. The tip is in the SVC per ECG reading. See image below.      Line is okay to use: yes    Donna Mathis RN, VAT

## 2019-08-27 NOTE — PROGRESS NOTES
Kota wet to dry dressing change to right foot with no pain; feels some pressure only there. Plan for PICC placement; antibiotics continue.

## 2019-08-27 NOTE — PROGRESS NOTES
Nutrition   Reason for assessment: Length of stay    Assessment:    Food/Nutrition and Pertinent History: Admitted with DM foot wound s/p debridement, abscess drain, 5th toe amputation and FB removal.  PMH remarkable for DM. Pt is sleeping in chair at [address] visit but easily awakens. He is noted to be slow to respond to questions. He doesn't recall previously speaking with CDE. He reports no DM meds for 2-3 years. When asked about his intake today he reports eating more food than previous meals. He recalls am meal of cereal, milk, banana and oranges. Later in conversation he comments \"I didn't want to eat too many chos that is why I ordered my breakfast like I did. \"      DIET DIABETIC CONSISTENT CARB Regular    PICC placement pending. +Lantus, SSI, zosyn, vancomycin. POC glucose 124-180 (8/26), 95 (8/27), AM glucose 98. A1C 9.7. Anthropometrics:Height: 5' 9\" (175.3 cm),  Weight: 95.3 kg (210 lb), unspecified , Body mass index is 31.01 kg/m². BMI class of obesity. Macronutrient needs: 95 kg listed body weight  EER:  1483-4560 kcal /day (20-25 kcal/kg)  EPR:   grams protein/day (1-1.25 grams/kg)    Intake/Comparative Standards: Recorded meal(s): 50%, 0%, 100%. Pt recalls eating ~50% most meals. This potentially meets ~25-50% of kcal and ~25-50% of protein needs                                                                         Nutrition Diagnosis: Food and nutrition knowledge deficit related to diabetes self care as evidenced by pt with incorrect statements about his food selections, unaware that fruits and cereals are cho sources, A1C 9.7. Intervention:   Meals and snacks: Continue current diet. Nutrition education: Provided patient with written and verbal instruction on consistent cho intake. Emphasis on food sources, portions, importance of oral intake for wound healing. Handouts provided: DM my plate, Simple steps to diabetes, Tips for fast food selection.     Patient verbalizes fair understanding of diet. Expect fair compliance. Nutrition Supplement Therapy: Add glucerna daily. Coordination of nutrition care: Freddie Mendoza RN. IDT rounds. Discharge Nutrition Plan: Too soon to determine.      Ernul [place], [address], [address] [facility]

## 2019-08-27 NOTE — PROGRESS NOTES
Pharmacokinetic Consult to Pharmacist    Chantal Mcintosh is a 49 y.o. male being treated for SSTI with zosyn and vancomycin. Height: 5' 9\" (175.3 cm)  Weight: 95.3 kg (210 lb)  Lab Results   Component Value Date/Time    BUN 13 08/27/2019 06:01 AM    Creatinine 0.96 08/27/2019 06:01 AM    WBC 8.8 08/27/2019 06:01 AM    Lactic Acid (POC) 2.02 (H) 08/23/2019 06:40 PM      Estimated Creatinine Clearance: 101.3 mL/min (based on SCr of 0.96 mg/dL). CULTURES:  Results     Procedure Component Value Units Date/Time    CULTURE, Rikki Vazquez STAIN [[id number]]  (Abnormal) Collected:  08/25/19 0830    Order Status:  Completed Specimen:  Wound from Foot Updated:  08/27/19 0655     Special Requests: RIGHT        GRAM STAIN 3 TO 18 WBC'S/OIF      MANY GRAM POSITIVE COCCI        Culture result:       LIGHT STAPHYLOCOCCUS SPECIES IDENTIFICATION AND SUSCEPTIBILITY TO FOLLOW                  MODERATE ALPHA STREPTOCOCCUS                  THIS ORGANISM WILL BE HELD FOR 7 DAYS. IF FURTHER TESTING IS REQUIRED PLEASE NOTIFY MICROBIOLOGY          CULTURE, ANAEROBIC [[id number]] Collected:  08/25/19 0830    Order Status:  Completed Specimen:  Foot Updated:  08/26/19 0928     Special Requests: RIGHT        Culture result:       SUBCULTURE IS NECESSARY TO DETERMINE PRESENCE OR ABSENCE OF ANAEROBIC BACTERIA IN THIS CULTURE. FURTHER REPORT TO FOLLOW AFTER INCUBATION OF SUBCULTURE.           CULTURE, BLOOD [[id number]]  (Abnormal)  (Susceptibility) Collected:  08/23/19 1840    Order Status:  Completed Specimen:  Blood Updated:  08/27/19 0631     Special Requests: --        RIGHT  Antecubital       GRAM STAIN GRAM POSITIVE COCCI         ANAEROBIC BOTTLE POSITIVE               RESULTS VERIFIED, PHONED TO AND READ BACK BY Gypsy Reed RN 08/24/2019 AT 1 ELF           Culture result: STREPTOCOCCUS ANGINOSUS         STREPTOCOCCUS SPECIES DETECTED Test Performed by Multiplex PCR            RESULTS VERIFIED, PHONED TO AND READ BACK BY  AIMEE NEVAREZ RN ON 08/25/19 @0614, ADS      Susceptibility      Streptococcus anginosus     QUINTON     Amoxicillin Susceptible     Cefepime ($$) Susceptible     Ceftriaxone ($) Susceptible     Clindamycin ($) Susceptible     Penicillin G ($$) Susceptible     Vancomycin ($) Susceptible                    CULTURE, BLOOD [[id number]]  (Abnormal) Collected:  08/23/19 8570    Order Status:  Completed Specimen:  Blood Updated:  08/27/19 8146     Special Requests: --        LEFT  ARM       GRAM STAIN GRAM POSITIVE COCCI         ANAEROBIC BOTTLE POSITIVE               RESULTS VERIFIED, PHONED TO AND READ BACK BY Gavino Lucia RN 08/24/2019 AT 1428 ELF           Culture result: ALPHA STREPTOCOCCUS         REFER TO ACC [id number] FOR ID AND SUSCEPTIBILITY            Lab Results   Component Value Date/Time    Vancomycin,trough 21.9 (HH) 08/27/2019 08:41 AM       Day 6 of vancomycin. Goal trough is 15-20. We will decrease the dose to 1g q12h. We will continue to follow the  Patient and adjust the dose as necessary per guidelines.     Thank you,  Ash Tang, PharmD

## 2019-08-27 NOTE — PROGRESS NOTES
H&P/Consult Note/Progress Note/Office Note:   Popeye Beckford  MRN: [id number]  :1965  Age:54 y.o.    HPI: Popeye Beckford is a 49 y.o. male who was admitted on 19 from the ER by the Hospitalists with a diabetic right foot infection. He reported progressive, constant, severe right foot pain for at least 2 days. He walks barefoot. Nothing made pain better. It is worse with palpation or standing. He had associated low grade fever. He was placed on IV Abx by Hospitalists  He has h/o DM and non- compliance (No primary care doctor, not on DM meds)  Bld Cxs x 2 negative so far  Xray as below      19 Xray right foot, 3 views  Hx: Redness and swelling of the right foot. Pain     There is a question of a triangular shaped foreign body along the plantar surface of the right foot   at the level of the third proximal phalanx. There is subcutaneous emphysema, raising possibility for superimposed infection. No fracture or dislocation.     IMPRESSION: Findings suggestive of triangular radiopaque foreign body within the  plantar soft tissues at the level of the third proximal phalanx. There is also  subcutaneous emphysema seen extending from the first through fifth digits at the  level of the proximal phalanges    19 Arterial duplex  The right and left lower extremity segmental blood pressures are  fairly symmetric and unremarkable. KAROLINA:  Right = 1.28              Left = 1.28     RIGHT LOWER EXTREMITY:  Peak systolic velocities-- within normal limits. Normal  triphasic waveform throughout.     LEFT LOWER EXTREMITY:  Peak systolic velocities-- within normal limits. Mildly  elevated distal popliteal artery velocity.  Normal triphasic waveforms throughout.     ABDOMEN:  No evidence of aneurysm.     IMPRESSION:  No significant arterial stenosis identified.             19 POD1 right foot debridement, abscess drainage, 5th toe amputation, and FB removal  19 POD2 No sign arterial stenosis on non-invasive vas study         Past Medical History:   Diagnosis Date    Diabetes ([address]      Past Surgical History:   Procedure Laterality Date    HX CHOLECYSTECTOMY       Current Facility-Administered Medications   Medication Dose Route Frequency    cloNIDine HCl (CATAPRES) tablet 0.1 mg  0.1 mg Oral BID PRN    VANCOMYCIN INFORMATION NOTE   Other ONCE    vancomycin (VANCOCIN) 1250 mg in  ml infusion  1,250 mg IntraVENous Q12H    0.9% sodium chloride infusion  100 mL/hr IntraVENous CONTINUOUS    piperacillin-tazobactam (ZOSYN) 3.375 g in 0.9% sodium chloride (MBP/ADV) 100 mL  3.375 g IntraVENous Q8H    insulin glargine (LANTUS) injection 10 Units  10 Units SubCUTAneous DAILY    povidone-iodine (BETADINE) 10 % topical solution   Topical DAILY    sodium chloride (NS) flush 5-40 mL  5-40 mL IntraVENous Q8H    sodium chloride (NS) flush 5-40 mL  5-40 mL IntraVENous PRN    HYDROcodone-acetaminophen (NORCO) 7.5-325 mg per tablet 1 Tab  1 Tab Oral Q4H PRN    morphine injection 2 mg  2 mg IntraVENous Q4H PRN    naloxone (NARCAN) injection 0.4 mg  0.4 mg IntraVENous PRN    diphenhydrAMINE (BENADRYL) capsule 25 mg  25 mg Oral Q4H PRN    promethazine (PHENERGAN) tablet 25 mg  25 mg Oral Q6H PRN    magnesium hydroxide (MILK OF MAGNESIA) 400 mg/5 mL oral suspension 30 mL  30 mL Oral DAILY PRN    nicotine (NICODERM CQ) 21 mg/24 hr patch 1 Patch  1 Patch TransDERmal DAILY PRN    enoxaparin (LOVENOX) injection 40 mg  40 mg SubCUTAneous Q24H    insulin lispro (HUMALOG) injection   SubCUTAneous AC&HS    acetaminophen (TYLENOL) tablet 650 mg  650 mg Oral Q4H PRN     Patient has no known allergies.   Social History     Socioeconomic History    Marital status: SINGLE     Spouse name: Not on file    Number of children: Not on file    Years of education: Not on file    Highest education level: Not on file   Tobacco Use    Smoking status: Current Every Day Smoker   Substance and Sexual Activity    Alcohol use: No    Sexual activity: Never     Social History     Tobacco Use   Smoking Status Current Every Day Smoker     History reviewed. No pertinent family history. ROS: The patient has no difficulty with chest pain or shortness of breath. No fever or chills. Comprehensive review of systems was otherwise unremarkable except as noted above. Physical Exam:   Visit Vitals  /72 (BP 1 Location: Right arm, BP Patient Position: At rest;Head of bed elevated (Comment degrees))   Pulse 71   Temp 96.9 °F (36.1 °C)   Resp 18   Ht 5' 9\" (1.753 m)   Wt 210 lb (95.3 kg)   SpO2 98%   BMI 31.01 kg/m²     Vitals:    08/26/19 1242 08/26/19 1607 08/26/19 1929 08/26/19 2357   BP: 134/74 162/85 145/70 142/72   Pulse: 63 67 66 71   Resp: 20 18 18 18   Temp: 97.6 °F (36.4 °C) 98.5 °F (36.9 °C) 97.7 °F (36.5 °C) 96.9 °F (36.1 °C)   SpO2: 98% 97% 96% 98%   Weight:       Height:         08/26 1901 - 08/27 0700  In: -   Out: 500 [Urine:500]  08/25 0701 - 08/26 1900  In: 1852 [I.V.:1852]  Out: 1250 [Urine:1250]    Constitutional: Alert, oriented, cooperative patient in no acute distress; appears stated age    Eyes:Sclera are clear. EOMs intact  ENMT: no external lesions gross hearing normal; no obvious neck masses, no ear or lip lesions, nares normal  CV: RRR. Normal perfusion  Resp: No JVD. Breathing is  non-labored; no audible wheezing. GI: soft and non-distended     Musculoskeletal: unremarkable with normal function. No embolic signs or cyanosis. Right 5th toe amputation. Exposed tendons on plantar right foot  Some eraly granulation appearing in wound  Tenuous right 2nd, 3rd, and 4th toes. Less cellulitic change across volar forefoot  No fluctuance;  No crepitus, No necrosis or debris  +edema  Hard to palpate pedal pulses    Neuro:  Oriented; moves all 4; no focal deficits  Psychiatric: normal affect and mood, no memory impairment    Recent vitals (if inpt):  Patient Vitals for the past 24 hrs:   BP Temp Pulse Resp SpO2   08/26/19 2357 142/72 96.9 °F (36.1 °C) 71 18 98 %   08/26/19 1929 145/70 97.7 °F (36.5 °C) 66 18 96 %   08/26/19 1607 162/85 98.5 °F (36.9 °C) 67 18 97 %   08/26/19 1242 134/74 97.6 °F (36.4 °C) 63 20 98 %   08/26/19 0754 164/82 98 °F (36.7 °C) 62 18 97 %       Labs:  Recent Labs     08/27/19  0601 08/26/19  0422   WBC 8.8 9.1   HGB 11.0* 11.6*    187   NA  --  137   K  --  4.1   CL  --  104   CO2  --  26   BUN  --  15   CREA  --  1.09   GLU  --  125*       Lab Results   Component Value Date/Time    WBC 8.8 08/27/2019 06:01 AM    HGB 11.0 (L) 08/27/2019 06:01 AM    PLATELET 820 60/05/8202 06:01 AM    Sodium 137 08/26/2019 04:22 AM    Potassium 4.1 08/26/2019 04:22 AM    Chloride 104 08/26/2019 04:22 AM    CO2 26 08/26/2019 04:22 AM    BUN 15 08/26/2019 04:22 AM    Creatinine 1.09 08/26/2019 04:22 AM    Glucose 125 (H) 08/26/2019 04:22 AM    Bilirubin, total 1.5 (H) 08/23/2019 05:54 PM    AST (SGOT) 10 (L) 08/23/2019 05:54 PM    ALT (SGPT) 14 08/23/2019 05:54 PM    Alk. phosphatase 129 08/23/2019 05:54 PM       CT Results  (Last 48 hours)    None        chest X-ray      I reviewed recent labs, recent radiologic studies, and pertinent records including other doctor notes if needed. I independently reviewed radiology images for studies I described above or studies I have ordered.    Admission date (for inpatients): 8/23/2019   * No surgery found *  Procedure(s):  Right foot debridment, removal foreign body, right 5th toe amputation     ASSESSMENT/PLAN:  Problem List  Date Reviewed: 8/24/2019          Codes Class Noted    * (Principal) Di[facility] right foot [facility] ICD-10-CM: E11.628, L08.9  ICD-9-CM: 250.80, 686.9  8/24/2019        Sepsis ([email or web address] ICD-10-CM: A41.9  ICD-9-CM: 038.9, 995.91  8/24/2019        Cellulitis of right leg ICD-10-CM: L03.115  ICD-9-CM: 682.6  8/23/2019        Foreign body (FB) in soft tissue ICD-10-CM: M79.5  ICD-9-CM: 729.6  8/23/2019        Subcutaneous emphysema ([facility] [[email or web address] ICD-10-CM: T79. 7XXA  ICD-9-CM: 958.7  8/23/2019        Uncontrolled type II diabetes mellitus ([facility] ICD-10-CM: E11.65  ICD-9-CM: 250.02  8/23/2019        Personal history of noncompliance with medical treatment ICD-10-CM: Z91.19  ICD-9-CM: V15.81  8/23/2019        Hyponatremia ICD-10-CM: E87.1  ICD-9-CM: 276.1  8/23/2019            Principal Problem:    Diabetic infection [facility] right foot ([facility] (8/24/2019)    Active Problems:    Cellulitis of right leg (8/23/2019)      Foreign body (FB) in soft tissue (8/23/2019)      Subcutaneous emphysema ([facility] (8/23/2019)      Uncontrolled type II diabetes mellitus ([facility] (8/23/2019)      Personal history of noncompliance with medical treatment (8/23/2019)      Hyponatremia (8/23/2019)      Sepsis ([facility] (8/24/2019)         Diabetic right foot infection - limb threatening  S/p right 5th toe amputation and distal plantar forefoot debridement with extraction of glass (FB)  OR cultures pending--->gm + cocci with alpha Strep noted so far  Cont IV Zosyn/Vanc; Pharmacy dosing Vancomycin    He may lose his right foot  I recommend SNF placement for 1-2 months  He needs to be complete non-weight bearing for right foot   and he won't be complaint with treatment (diabetic, dressings, off-loading, etc...)  if we send him home as he has no family or support    PVD  No significant arterial stenosis noted on non-invasive arterial duple on 8/26/19    Uncontrolled DM  Inpt diabetic control  Sliding scale and semiquants    Non-Compliance with outpt DM regimen  Encourage compliance  Risks of non-compliance discussed including amputation and early death  He needs SNF            I have personally performed a face-to-face diagnostic evaluation and management  service on this patient. I have independently seen the patient. I have independently obtained the above history from the patient/family. I have independently examined the patient with above findings.   I have independently reviewed data/labs for this patient and developed the above plan of care (MDM). Signed: Kevin Charles.  Agnes Kaufman MD, FACS

## 2019-08-27 NOTE — PROGRESS NOTES
Plan: per Dr. Renee Chimera          Continue vancomycin and Zosyn  ·   ·  pending final wound cult ID  · Place PICC  · Discussed with case management. Patient with Medicaid pending, they will try to get admission to SNF approved. Can discuss changing to less frequently dosed regimen if necessary for SNF. Pt is aware of the situation. We are waiting for final clx for ID to determine proper ABX. Once that is established, we can determine if pt will be able togo home with IV or PO.  Will need to check pricing w/ [employer].

## 2019-08-28 LAB
ANION GAP SERPL CALC-SCNC: 6 MMOL/L (ref 7–16)
BUN SERPL-MCNC: 10 MG/DL (ref 6–23)
CALCIUM SERPL-MCNC: 8.4 MG/DL (ref 8.3–10.4)
CHLORIDE SERPL-SCNC: 103 MMOL/L (ref 98–107)
CO2 SERPL-SCNC: 28 MMOL/L (ref 21–32)
CREAT SERPL-MCNC: 0.88 MG/DL (ref 0.8–1.5)
ERYTHROCYTE [DISTWIDTH] IN BLOOD BY AUTOMATED COUNT: 12.8 % (ref 11.9–14.6)
GLUCOSE BLD STRIP.AUTO-MCNC: 145 MG/DL (ref 65–100)
GLUCOSE BLD STRIP.AUTO-MCNC: 149 MG/DL (ref 65–100)
GLUCOSE BLD STRIP.AUTO-MCNC: 213 MG/DL (ref 65–100)
GLUCOSE BLD STRIP.AUTO-MCNC: 82 MG/DL (ref 65–100)
GLUCOSE SERPL-MCNC: 99 MG/DL (ref 65–100)
HCT VFR BLD AUTO: 32.3 % (ref 41.1–50.3)
HGB BLD-MCNC: 10.6 G/DL (ref 13.6–17.2)
MCH RBC QN AUTO: 27.9 PG (ref 26.1–32.9)
MCHC RBC AUTO-ENTMCNC: 32.8 G/DL (ref 31.4–35)
MCV RBC AUTO: 85 FL (ref 79.6–97.8)
NRBC # BLD: 0 K/UL (ref 0–0.2)
PLATELET # BLD AUTO: 191 K/UL (ref 150–450)
PMV BLD AUTO: 10.3 FL (ref 9.4–12.3)
POTASSIUM SERPL-SCNC: 3.6 MMOL/L (ref 3.5–5.1)
RBC # BLD AUTO: 3.8 M/UL (ref 4.23–5.6)
SODIUM SERPL-SCNC: 137 MMOL/L (ref 136–145)
VANCOMYCIN TROUGH SERPL-MCNC: 17.5 UG/ML (ref 5–20)
WBC # BLD AUTO: 8.4 K/UL (ref 4.3–11.1)

## 2019-08-28 PROCEDURE — 74011250636 HC RX REV CODE- 250/636: Performed by: INTERNAL MEDICINE

## 2019-08-28 PROCEDURE — 65270000029 HC RM PRIVATE

## 2019-08-28 PROCEDURE — 80048 BASIC METABOLIC PNL TOTAL CA: CPT

## 2019-08-28 PROCEDURE — 74011636637 HC RX REV CODE- 636/637: Performed by: SURGERY

## 2019-08-28 PROCEDURE — 74011250637 HC RX REV CODE- 250/637: Performed by: FAMILY MEDICINE

## 2019-08-28 PROCEDURE — 74011250636 HC RX REV CODE- 250/636: Performed by: SURGERY

## 2019-08-28 PROCEDURE — 85027 COMPLETE CBC AUTOMATED: CPT

## 2019-08-28 PROCEDURE — 74011000258 HC RX REV CODE- 258: Performed by: INTERNAL MEDICINE

## 2019-08-28 PROCEDURE — 74011000258 HC RX REV CODE- 258: Performed by: SURGERY

## 2019-08-28 PROCEDURE — 74011250637 HC RX REV CODE- 250/637: Performed by: SURGERY

## 2019-08-28 PROCEDURE — 74011250637 HC RX REV CODE- 250/637: Performed by: HOSPITALIST

## 2019-08-28 PROCEDURE — 97530 THERAPEUTIC ACTIVITIES: CPT

## 2019-08-28 PROCEDURE — 97535 SELF CARE MNGMENT TRAINING: CPT

## 2019-08-28 PROCEDURE — 82962 GLUCOSE BLOOD TEST: CPT

## 2019-08-28 PROCEDURE — 80202 ASSAY OF VANCOMYCIN: CPT

## 2019-08-28 RX ORDER — LISINOPRIL 20 MG/1
40 TABLET ORAL DAILY
Status: DISCONTINUED | OUTPATIENT
Start: 2019-08-28 | End: 2019-09-04 | Stop reason: HOSPADM

## 2019-08-28 RX ADMIN — PIPERACILLIN SODIUM,TAZOBACTAM SODIUM 3.38 G: 3; .375 INJECTION, POWDER, FOR SOLUTION INTRAVENOUS at 11:37

## 2019-08-28 RX ADMIN — VANCOMYCIN HYDROCHLORIDE 1000 MG: 1 INJECTION, POWDER, LYOPHILIZED, FOR SOLUTION INTRAVENOUS at 09:52

## 2019-08-28 RX ADMIN — VANCOMYCIN HYDROCHLORIDE 1000 MG: 1 INJECTION, POWDER, LYOPHILIZED, FOR SOLUTION INTRAVENOUS at 21:00

## 2019-08-28 RX ADMIN — Medication 600 UNITS: at 20:59

## 2019-08-28 RX ADMIN — Medication 600 UNITS: at 00:02

## 2019-08-28 RX ADMIN — Medication 600 UNITS: at 05:45

## 2019-08-28 RX ADMIN — INSULIN LISPRO 4 UNITS: 100 INJECTION, SOLUTION INTRAVENOUS; SUBCUTANEOUS at 23:29

## 2019-08-28 RX ADMIN — INSULIN LISPRO 3 UNITS: 100 INJECTION, SOLUTION INTRAVENOUS; SUBCUTANEOUS at 00:02

## 2019-08-28 RX ADMIN — Medication 10 ML: at 05:45

## 2019-08-28 RX ADMIN — CLONIDINE HYDROCHLORIDE 0.1 MG: 0.1 TABLET ORAL at 23:41

## 2019-08-28 RX ADMIN — Medication 10 ML: at 21:00

## 2019-08-28 RX ADMIN — Medication 600 UNITS: at 14:00

## 2019-08-28 RX ADMIN — Medication 5 ML: at 14:30

## 2019-08-28 RX ADMIN — ENOXAPARIN SODIUM 40 MG: 40 INJECTION SUBCUTANEOUS at 08:08

## 2019-08-28 RX ADMIN — LISINOPRIL 40 MG: 20 TABLET ORAL at 09:58

## 2019-08-28 RX ADMIN — CEFTRIAXONE SODIUM 2 G: 2 INJECTION, POWDER, FOR SOLUTION INTRAMUSCULAR; INTRAVENOUS at 16:11

## 2019-08-28 RX ADMIN — HYDROCODONE BITARTRATE AND ACETAMINOPHEN 1 TABLET: 7.5; 325 TABLET ORAL at 08:08

## 2019-08-28 RX ADMIN — CLONIDINE HYDROCHLORIDE 0.1 MG: 0.1 TABLET ORAL at 16:56

## 2019-08-28 RX ADMIN — INSULIN GLARGINE 10 UNITS: 100 INJECTION, SOLUTION SUBCUTANEOUS at 09:58

## 2019-08-28 RX ADMIN — PIPERACILLIN SODIUM,TAZOBACTAM SODIUM 3.38 G: 3; .375 INJECTION, POWDER, FOR SOLUTION INTRAVENOUS at 03:18

## 2019-08-28 RX ADMIN — CLONIDINE HYDROCHLORIDE 0.1 MG: 0.1 TABLET ORAL at 00:09

## 2019-08-28 NOTE — PROGRESS NOTES
Infectious Disease Progress Note    Today's Date: 2019   Admit Date: 2019    Impression:   · L Diabetic foot infection. Plain film and MRI not suggesting osteomyelitis. He is  s/p debridement, wound culture with alpha strep and also staph aureus; sensitivities still pending. No anaerobic growth on day 4.   · Streptococcus anginosus bacteremia 2/4 bottles,secondary to #1, TTE negative. Presumably the alpha strep. Plan:   · Continue vancomycin. · Stop Zosyn and change to CTX 2 gm daily. IF Staph is MSSA then can stop Vanc and treat with Ceftriaxone 2 gm IV daily x 2 weeks: EOT  19. IF MRSA then we can use Vanc for coverage both organisms and same EOT. Vanc level today above goal; CRT stable. · Place PICC  · Discussed with case management. Patient with Medicaid pending, they will try to get admission to SNF approved. Can discuss changing to less frequently dosed regimen if necessary for SNF. Anti-infectives:   1. Vancomycin -  2. Zosyn -  3. Ceftriaxone -    Subjective:     Doing well no new complaints. Eating dinner: tobi food cake first before meal.     Review of Systems:  A comprehensive review of systems was negative except for that written in the History of Present Illness. Objective:     Visit Vitals  /77 (BP 1 Location: Left arm, BP Patient Position: At rest)   Pulse 61   Temp 98.4 °F (36.9 °C)   Resp 18   Ht 5' 9\" (1.753 m)   Wt 95.3 kg (210 lb)   SpO2 96%   BMI 31.01 kg/m²     Temp (24hrs), Av.6 °F (36.4 °C), Min:96.8 °F (36 °C), Max:98.9 °F (37.2 °C)      Lines:  Peripheral IV    Physical Exam:   General:  Alert, cooperative, well noursished, well developed, appears stated age   Eyes:  Sclera anicteric. Pupils equally round and reactive to light. Neck: Supple   Lungs:    good effort   Abdomen:   Soft, non-tender.  bowel sounds normal. non-distended   Extremities: L foot dressed   Skin: Skin color, texture, turgor normal. no acute rash or lesions Musculoskeletal: No swelling or deformity   Lines/Devices:  Intact, no erythema, drainage or tenderness   Psych: Alert and oriented, normal mood affect given the setting       Data Review:     CBC:   Recent Labs     08/28/19  0552 08/27/19  0601 08/26/19  0422   WBC 8.4 8.8 9.1   RBC 3.80* 3.84* 4.06*   HGB 10.6* 11.0* 11.6*   HCT 32.3* 32.6* 34.7*    183 187   GRANS  --  74 72   LYMPH  --  16 19   EOS  --  2 1     CMP:   Recent Labs     08/28/19  0552 08/27/19  0601 08/26/19  0422   GLU 99 98 125*    137 137   K 3.6 3.7 4.1    105 104   CO2 28 27 26   BUN 10 13 15   CREA 0.88 0.96 1.09   CA 8.4 7.9* 8.0*   AGAP 6* 5* 7     Liver Enzymes: No results for input(s): TP, ALB, TBIL, AP, SGOT, GPT in the last 72 hours. No lab exists for component: DBIL     Microbiology:     All Micro Results     Procedure Component Value Units Date/Time    CULTURE, ANAEROBIC [[id number]] Collected:  08/25/19 0830    Order Status:  Completed Specimen:  Foot Updated:  08/28/19 1020     Special Requests: RIGHT        Culture result:       NO ANAEROBES ISOLATED 3 DAYS          CULTURE, BLOOD [[id number]]  (Abnormal) Collected:  08/23/19 1840    Order Status:  Completed Specimen:  Blood Updated:  08/28/19 0814     Special Requests: --        LEFT  ARM       GRAM STAIN GRAM POSITIVE COCCI         ANAEROBIC BOTTLE POSITIVE               RESULTS VERIFIED, PHONED TO AND READ BACK BY Viky Maravilla RN 08/24/2019 AT 1 ELF                  AEROBIC BOTTLE POSITIVE GRAM POSITIVE COCCI RESULTS VERIFIED, PHONED TO AND READ BACK BY YANELI VIRAMONTES 0813. SBRAZEL           Culture result: ALPHA STREPTOCOCCUS         REFER TO [address] [id number] FOR ID AND SUSCEPTIBILITY    CULTURE, Hosea Roers STAIN [[id number]]  (Abnormal) Collected:  08/25/19 0830    Order Status:  Completed Specimen:  Wound from Foot Updated:  08/28/19 0644     Special Requests: RIGHT        GRAM STAIN 3 TO 18 WBC'S/OIF      MANY GRAM POSITIVE COCCI        Culture result: LIGHT STAPHYLOCOCCUS AUREUS REPEATING SUSCEPTIBILITY                  MODERATE ALPHA STREPTOCOCCUS IDENTIFICATION AND SUSCEPTIBILITY TO FOLLOW          CULTURE, BLOOD [[id number]]  (Abnormal)  (Susceptibility) Collected:  08/23/19 1840    Order Status:  Completed Specimen:  Blood Updated:  08/27/19 0631     Special Requests: --        RIGHT  Antecubital       GRAM STAIN GRAM POSITIVE COCCI         ANAEROBIC BOTTLE POSITIVE               RESULTS VERIFIED, PHONED TO AND READ BACK BY Gypsy Reed RN 08/24/2019 AT 1428 ELF           Culture result: STREPTOCOCCUS ANGINOSUS         STREPTOCOCCUS SPECIES DETECTED Test Performed by Multiplex PCR            RESULTS VERIFIED, PHONED TO AND READ BACK BY  AIMEE NEVAREZ RN ON 08/25/19 @0614, ADS            Imaging:     Results   XR FOOT RT MIN 3 V (Accession [id number]) (Order [id number])   Allergies      No Known Allergies   Result Information     Status: Final result (Exam End: 8/23/2019 19:05) Provider Status: Open   Signed by     Signed Date/Time  Phone Pager   Charly Platt 8/23/2019 19:09 [phone]    Exam Information     Status Exam Begun  Exam Ended    Final [99] 8/23/2019 18:54 8/23/2019 19:05   Study Result     History: Redness and swelling of the right foot. Pain     EXAM: 3 views right foot     FINDINGS: There is a question of a triangular shaped foreign body along the  plantar surface of the right foot at the level of the third proximal phalanx. There is subcutaneous emphysema, raising possibility for superimposed infection. No fracture or dislocation.     IMPRESSION  IMPRESSION: Findings suggestive of triangular radiopaque foreign body within the  plantar soft tissues at the level of the third proximal phalanx. There is also  subcutaneous emphysema seen extending from the first through fifth digits at the  level of the proximal phalanges.      No Known Allergies   Wet Read      Elias Antunez MD on 8/25/2019  4:31 PM ([id number], Radiology) Prelim: be sure to see final report.  No definite osteomyelitis.   Soft tissue swelling. Result Information     Status: Final result (Exam End: 8/25/2019 16:15) Provider Status: Open   Signed by     Signed Date/Time  Phone Pager   Everette Stanford 8/26/2019 14:30 [phone]    Exam Information     Status Exam Begun  Exam Ended    Final [99] 8/25/2019 15:21 8/25/2019 16:15   Study Result     HISTORY: Recent fifth toe amputation and foreign body removal.     EXAM: MRI right foot with and without contrast     TECHNIQUE: Multiplanar multisequence imaging is performed both before and after  the uneventful administration of 20 mL Dotarem.     COMPARISON: Plain films dated 8/23/2019     FINDINGS: The exam is compromised by patient motion.     There is a large soft tissue defect seen along the plantar surface of the right  foot. Findings compatible with the patient's given clinical history of recent  surgery. The patient is status post interval removal of the fifth digit at the  level of the MTP articulation. No evidence of osteomyelitis or abscess. There is  edema in the plantar musculature of the right foot.     IMPRESSION  IMPRESSION:  1. Large soft tissue defect with postsurgical change as described. 2. No evidence of abscess or osteomyelitis. 3. Plantar muscular edema. Findings compatible with chronic denervation change.          Signed By: Sandor Zapata MD     August 28, 2019

## 2019-08-28 NOTE — PROGRESS NOTES
Problem: Diabetes Self-Management  Goal: *Disease process and treatment process  Description  Define diabetes and identify own type of diabetes; list 3 options for treating diabetes. Outcome: Progressing Towards Goal  Goal: *Incorporating nutritional management into lifestyle  Description  Describe effect of type, amount and timing of food on blood glucose; list 3 methods for planning meals. Outcome: Progressing Towards Goal     Problem: Falls - Risk of  Goal: *Absence of Falls  Description  Document Maggie Roy Fall Risk and appropriate interventions in the flowsheet. Outcome: Progressing Towards Goal  Note:   Fall Risk Interventions:  Mobility Interventions: Bed/chair exit alarm, Patient to call before getting OOB         Medication Interventions: Bed/chair exit alarm, Evaluate medications/consider consulting pharmacy    Elimination Interventions: Call light in reach, Patient to call for help with toileting needs, Urinal in reach              Problem: Nutrition Deficit  Goal: *Optimize nutritional status  Outcome: Progressing Towards Goal       Dressing changed per order, pt reva well. Minimal sensation.

## 2019-08-28 NOTE — PROGRESS NOTES
Problem: Mobility Impaired (Adult and Pediatric)  Goal: *Acute Goals and Plan of Care (Insert Text)  Description  DISCHARGE GOALS :  (1.)Mr. Jayme Flynn will move from supine to sit and sit to supine  with MODIFIED INDEPENDENCE. (2.)Mr. Jayme Flynn will transfer from bed to chair and chair to bed with SUPERVISION using a walker NWB RIGHT LE. PLAN TO SET AMBULATION & STAIR GOALS WHEN SURGEON ALLOWS PATIENT TO USE AN OFF LOADING BOOT TO SAFELY ALLOW PT TO ATTEMPT THIS ACTIVITY SAFELY. ________________________________________________________________________________________________   Outcome: Progressing Towards Goal     PHYSICAL THERAPY: Daily Note and AM 8/28/2019  INPATIENT: PT Visit Days : 3  Payor: MEDICAID PENDING / Plan: [place] PENDING / Product Type: Medicaid /       NAME/AGE/GENDER: Shama Davis is a 49 y.o. male   PRIMARY DIAGNOSIS: Cellulitis of right leg [P21.770] Diabetic infection of right foot ([address]   Diabetic infection of right foot ([address]    Procedure(s) (LRB):  Right foot debridment, removal foreign body, right 5th toe amputation  (Right)  3 Days Post-Op  ICD-10: Treatment Diagnosis:    · Generalized Muscle Weakness (M62.81)  · Other lack of cordination (R27.8)   Precaution/Allergies:  Patient has no known allergies. ASSESSMENT:     Mr. Jayme Flynn still has difficulty with transitioning to standing, having a tendency to touch heel briefly when coming to stand & sitting back down. Worked on repeated sit<>stand with emphasis on NWB right LE with also repeated transfers bed<>recliner. PT resisted bilateral UE shoulder flex with triceps extension 3 sets of 5 reps, need to show pt bench press motion with t-band on follow up. PT left message on [employer]'s VM to request an off loading boot for right foot. This section established at most recent assessment   PROBLEM LIST (Impairments causing functional limitations):  1. Decreased Strength  2. Decreased ADL/Functional Activities  3.  Decreased Transfer Abilities  4. Decreased Ambulation Ability/Technique  5. Decreased Balance  6. Decreased Activity Tolerance   INTERVENTIONS PLANNED: (Benefits and precautions of physical therapy have been discussed with the patient.)  1. Balance Exercise  2. Bed Mobility  3. Therapeutic Activites  4. Therapeutic Exercise/Strengthening  5. Transfer Training     TREATMENT PLAN: Frequency/Duration: daily for duration of hospital stay  Rehabilitation Potential For Stated Goals: Good     REHAB RECOMMENDATIONS (at time of discharge pending progress):    Placement: It is my opinion, based on this patient's performance to date, that Mr. Nora Dawkins may benefit from intensive therapy at an [address] [place] [address] after discharge due to potential to make ongoing and sustainable functional improvement that is of practical value. .  Equipment:    To be determined               HISTORY:   History of Present Injury/Illness (Reason for Referral):  Patient is a 49years old male with history of NDDM and non complicance presents with redness, swelling, pain of R foot and low grade fever for last 5 days. He do not recall injuries but he walks bare foot not infrequently. He has no PCP, on no medications for years despite his need for low dose insulin 2-3 y ago. Past Medical History/Comorbidities:   Mr. Nora Dawkins  has a past medical history of Diabetes ([address] Mr. Nora Dawkins  has a past surgical history that includes hx cholecystectomy. Social History/Living Environment:   Home Environment: Private residence  # Steps to Enter: 5  Rails to Enter: Yes  Hand Rails : Right  One/Two Story Residence: One story  Living Alone: Yes(Room mate but alone during the day)  Support Systems: Friends \ neighbors  Patient Expects to be Discharged to[de-identified] Private residence  Current DME Used/Available at Home: None  Prior Level of Function/Work/Activity:  Pt was independent without an assistive device prior to this admission. Personal Factors:           Other factors that influence how disability is experienced by the patient:  current & PMH    Number of Personal Factors/Comorbidities that affect the Plan of Care: 3+: HIGH COMPLEXITY   EXAMINATION:   Most Recent Physical Functioning:   Gross Assessment:  AROM: Generally decreased, functional(left LE & both UE's)  Strength: Generally decreased, functional(left LE & both UE's)  Coordination: Generally decreased, functional(left LE & both UE's)                    Balance:  Sitting: Intact; Without support  Standing: Impaired; With support(walker) Bed Mobility:  Supine to Sit: Stand-by assistance  Sit to Supine: (NT)  Scooting: Contact guard assistance       Transfers:  Sit to Stand: Minimum assistance  Stand to Sit: Minimum assistance  Bed to Chair: Minimum assistance(with walker)  Duration: 48 Minutes(extra time to work through activity noted)  Gait:  Right Side Weight Bearing: Non-weight bearing      Functional Mobility:         Transfers:  min        Bed Mobility:  sba   Body Structures Involved:  1. Metabolic  2. Muscles Body Functions Affected:  1. Movement Related  2. Metobolic/Endocrine Activities and Participation Affected:  1. General Tasks and Demands  2. Mobility   Number of elements that affect the Plan of Care: 4+: HIGH COMPLEXITY   CLINICAL PRESENTATION:   Presentation: Evolving clinical presentation with unstable and unpredictable characteristics: HIGH COMPLEXITY   CLINICAL DECISION MAKIN [facility] Inpatient Short Form  How much difficulty does the patient currently have. .. Unable A Lot A Little None   1. Turning over in bed (including adjusting bedclothes, sheets and blankets)? ? 1   ? 2   ? 3   ? 4   2. Sitting down on and standing up from a chair with arms ( e.g., wheelchair, bedside commode, etc.)   ? 1   ? 2   ? 3   ? 4   3.   Moving from lying on back to sitting on the side of the bed?   ? 1   ? 2   ? 3   ? 4   How much help from another person does the patient currently need. .. Total A Lot A Little None   4. Moving to and from a bed to a chair (including a wheelchair)? ? 1   ? 2   ? 3   ? 4   5. Need to walk in hospital room? ? 1   ? 2   ? 3   ? 4   6. Climbing 3-5 steps with a railing? ? 1   ? 2   ? 3   ? 4   © 2007, Trustees of [address] [place] [address] [address] [id number], under license to [employer]. All rights reserved      Score:  Initial: 14 Most Recent: X (Date: -- )    Interpretation of Tool:  Represents activities that are increasingly more difficult (i.e. Bed mobility, Transfers, Gait). Medical Necessity:     · Patient is expected to demonstrate progress in strength, balance, coordination and functional technique  ·  to decrease assistance required with bed mobility & transfers. · .  Reason for Services/Other Comments:  · Patient continues to require skilled intervention due to pt being unsafe with functional mobility   · . Use of outcome tool(s) and clinical judgement create a POC that gives a: Difficult prediction of patient's progress: HIGH COMPLEXITY            TREATMENT:   (In addition to Assessment/Re-Assessment sessions the following treatments were rendered)   Pre-treatment Symptoms/Complaints:  \" how am I doing ? \"  Pain: Initial: numeric scale  Pain Intensity 1: 0  Post Session: 0/10     Therapeutic Activity: (  48 Minutes(extra time to work through activity noted) ):  Therapeutic activities including bed mobility, repeated sit<>stand & repeated stand pivot transfer, UE resisted bench press to improve mobility, strength, balance, coordination and dynamic movement of arm - bilateral and leg - left to improve functional core stability.   Braces/Orthotics/Lines/Etc:   · IV  Treatment/Session Assessment:    · Response to Treatment:  Tolerated fairly well  · Interdisciplinary Collaboration:   o Registered Nurse  o Physician  o   o Rehabilitation Attendant  · After treatment position/precautions:   o Up in chair  o Bed/Chair-wheels locked  o Call light within reach  o RN notified   · Compliance with Program/Exercises: Compliant most of the time, Will assess as treatment progresses  · Recommendations/Intent for next treatment session: \"Next visit will focus on reduction in assistance provided\".   Total Treatment Duration:  PT Patient Time In/Time Out  Time In: 0825  Time Out: 97 Aura Becker, PT

## 2019-08-28 NOTE — PROGRESS NOTES
H&P/Consult Note/Progress Note/Office Note:   Kaiden Blankenship  MRN: [id number]  :1965  Age:54 y.o.    HPI: Kaiden Blankenship is a 49 y.o. male who was admitted on 19 from the ER by the Hospitalists with a diabetic right foot infection. He reported progressive, constant, severe right foot pain for at least 2 days. He walks barefoot. Nothing made pain better. It is worse with palpation or standing. He had associated low grade fever. He was placed on IV Abx by Hospitalists  He has h/o DM and non- compliance (No primary care doctor, not on DM meds)  Bld Cxs x 2 negative so far  Xray as below      19 Xray right foot, 3 views  Hx: Redness and swelling of the right foot. Pain     There is a question of a triangular shaped foreign body along the plantar surface of the right foot   at the level of the third proximal phalanx. There is subcutaneous emphysema, raising possibility for superimposed infection. No fracture or dislocation.     IMPRESSION: Findings suggestive of triangular radiopaque foreign body within the  plantar soft tissues at the level of the third proximal phalanx. There is also  subcutaneous emphysema seen extending from the first through fifth digits at the  level of the proximal phalanges    19 Arterial duplex  The right and left lower extremity segmental blood pressures are  fairly symmetric and unremarkable. KAROLINA:  Right = 1.28              Left = 1.28     RIGHT LOWER EXTREMITY:  Peak systolic velocities-- within normal limits. Normal  triphasic waveform throughout.     LEFT LOWER EXTREMITY:  Peak systolic velocities-- within normal limits. Mildly  elevated distal popliteal artery velocity.  Normal triphasic waveforms throughout.     ABDOMEN:  No evidence of aneurysm.     IMPRESSION:  No significant arterial stenosis identified.             19 POD1 right foot debridement, abscess drainage, 5th toe amputation, and FB removal  19 POD2 No sign arterial stenosis on non-invasive vasc study   8/28/19 POD3 cultures from OR with alpha Strep        Past Medical History:   Diagnosis Date    Diabetes [facility])      Past Surgical History:   Procedure Laterality Date    HX CHOLECYSTECTOMY       Current Facility-Administered Medications   Medication Dose Route Frequency    vancomycin (VANCOCIN) 1,000 mg in 0.9% sodium chloride (MBP/ADV) 250 mL  1,000 mg IntraVENous Q12H    sodium chloride (NS) flush 10 mL  10 mL InterCATHeter Q8H    heparin (porcine) pf 600 Units  600 Units InterCATHeter Q8H    sodium chloride (NS) flush 10 mL  10 mL InterCATHeter PRN    heparin (porcine) pf 600 Units  600 Units InterCATHeter PRN    cloNIDine HCl (CATAPRES) tablet 0.1 mg  0.1 mg Oral BID PRN    piperacillin-tazobactam (ZOSYN) 3.375 g in 0.9% sodium chloride (MBP/ADV) 100 mL  3.375 g IntraVENous Q8H    insulin glargine (LANTUS) injection 10 Units  10 Units SubCUTAneous DAILY    povidone-iodine (BETADINE) 10 % topical solution   Topical DAILY    sodium chloride (NS) flush 5-40 mL  5-40 mL IntraVENous Q8H    sodium chloride (NS) flush 5-40 mL  5-40 mL IntraVENous PRN    HYDROcodone-acetaminophen (NORCO) 7.5-325 mg per tablet 1 Tab  1 Tab Oral Q4H PRN    morphine injection 2 mg  2 mg IntraVENous Q4H PRN    naloxone (NARCAN) injection 0.4 mg  0.4 mg IntraVENous PRN    diphenhydrAMINE (BENADRYL) capsule 25 mg  25 mg Oral Q4H PRN    promethazine (PHENERGAN) tablet 25 mg  25 mg Oral Q6H PRN    magnesium hydroxide (MILK OF MAGNESIA) 400 mg/5 mL oral suspension 30 mL  30 mL Oral DAILY PRN    nicotine (NICODERM CQ) 21 mg/24 hr patch 1 Patch  1 Patch TransDERmal DAILY PRN    enoxaparin (LOVENOX) injection 40 mg  40 mg SubCUTAneous Q24H    insulin lispro (HUMALOG) injection   SubCUTAneous AC&HS    acetaminophen (TYLENOL) tablet 650 mg  650 mg Oral Q4H PRN     Patient has no known allergies.   Social History     Socioeconomic History    Marital status: SINGLE     Spouse name: Not on file    Number of children: Not on file    Years of education: Not on file    Highest education level: Not on file   Tobacco Use    Smoking status: Current Every Day Smoker   Substance and Sexual Activity    Alcohol use: No    Sexual activity: Never     Social History     Tobacco Use   Smoking Status Current Every Day Smoker     History reviewed. No pertinent family history. ROS: The patient has no difficulty with chest pain or shortness of breath. No fever or chills. Comprehensive review of systems was otherwise unremarkable except as noted above. Physical Exam:   Visit Vitals  /80 (BP 1 Location: Left arm, BP Patient Position: At rest)   Pulse 68   Temp 97.3 °F (36.3 °C)   Resp 18   Ht 5' 9\" (1.753 m)   Wt 210 lb (95.3 kg)   SpO2 96%   BMI 31.01 kg/m²     Vitals:    08/27/19 2106 08/27/19 2342 08/28/19 0003 08/28/19 0308   BP: (!) 160/100 (!) 202/89 (!) 172/100 150/80   Pulse:  62  68   Resp:  18  18   Temp:  96.9 °F (36.1 °C)  97.3 °F (36.3 °C)   SpO2:  95%  96%   Weight:       Height:         08/27 1901 - 08/28 0700  In: -   Out: 700 [Urine:700]  08/26 0701 - 08/27 1900  In: 2414 [P.O.:300; I.V.:2114]  Out: 1325 [Urine:1325]    Constitutional: Alert, oriented, cooperative patient in no acute distress; appears stated age    Eyes:Sclera are clear. EOMs intact  ENMT: no external lesions gross hearing normal; no obvious neck masses, no ear or lip lesions, nares normal  CV: RRR. Normal perfusion  Resp: No JVD. Breathing is  non-labored; no audible wheezing. GI: soft and non-distended     Musculoskeletal: unremarkable with normal function. No embolic signs or cyanosis. Right 5th toe amputation. Exposed tendons on plantar right foot  Some eraly granulation appearing in wound  Tenuous right 2nd, 3rd, and 4th toes. Less cellulitic change across volar forefoot  No fluctuance;  No crepitus, No necrosis or debris  +edema  Hard to palpate pedal pulses    Neuro:  Oriented; moves all 4; no focal deficits  Psychiatric: normal affect and mood, no memory impairment    Recent vitals (if inpt):  Patient Vitals for the past 24 hrs:   BP Temp Pulse Resp SpO2   08/28/19 0308 150/80 97.3 °F (36.3 °C) 68 18 96 %   08/28/19 0003 (!) 172/100 -- -- -- --   08/27/19 2342 (!) 202/89 96.9 °F (36.1 °C) 62 18 95 %   08/27/19 2106 (!) 160/100 -- -- -- --   08/27/19 2020 (!) 208/110 98.9 °F (37.2 °C) 63 18 95 %   08/27/19 1605 (!) 171/98 96.8 °F (36 °C) 60 18 93 %   08/27/19 1224 146/83 96.7 °F (35.9 °C) (!) 57 18 96 %   08/27/19 0709 182/88 97.8 °F (36.6 °C) 63 16 92 %       Labs:  Recent Labs     08/28/19  0552   WBC 8.4   HGB 10.6*         K 3.6      CO2 28   BUN 10   CREA 0.88   GLU 99       Lab Results   Component Value Date/Time    WBC 8.4 08/28/2019 05:52 AM    HGB 10.6 (L) 08/28/2019 05:52 AM    PLATELET 339 82/90/2142 05:52 AM    Sodium 137 08/28/2019 05:52 AM    Potassium 3.6 08/28/2019 05:52 AM    Chloride 103 08/28/2019 05:52 AM    CO2 28 08/28/2019 05:52 AM    BUN 10 08/28/2019 05:52 AM    Creatinine 0.88 08/28/2019 05:52 AM    Glucose 99 08/28/2019 05:52 AM    Bilirubin, total 1.5 (H) 08/23/2019 05:54 PM    AST (SGOT) 10 (L) 08/23/2019 05:54 PM    ALT (SGPT) 14 08/23/2019 05:54 PM    Alk. phosphatase 129 08/23/2019 05:54 PM       CT Results  (Last 48 hours)    None        chest X-ray      I reviewed recent labs, recent radiologic studies, and pertinent records including other doctor notes if needed. I independently reviewed radiology images for studies I described above or studies I have ordered.    Admission date (for inpatients): 8/23/2019   * No surgery found *  Procedure(s):  Right foot debridment, removal foreign body, right 5th toe amputation     ASSESSMENT/PLAN:  Problem List  Date Reviewed: 8/24/2019          Codes Class Noted    * (Principal) Diabetic infection of right foot ([facility] ICD-10-CM: E11.628, L08.9  ICD-9-CM: 250.80, 686.9  8/24/2019        Sepsis ([facility] ICD-10-CM: A41.9  ICD-9-CM: 038.9, 995.91  8/24/2019 Cellulitis of right leg ICD-10-CM: L03.115  ICD-9-CM: 682.6  8/23/2019        Foreign body (FB) in soft tissue ICD-10-CM: M79.5  ICD-9-CM: 729.6  8/23/2019        Subcutaneous emphysema (HCC) ICD-10-CM: T79. 7XXA  ICD-9-CM: 958.7  8/23/2019        Uncontrolled type II diabetes mellitus ([facility] ICD-10-CM: E11.65  ICD-9-CM: 250.02  8/23/2019        Personal history of noncompliance with medical treatment ICD-10-CM: Z91.19  ICD-9-CM: V15.81  8/23/2019        Hyponatremia ICD-10-CM: E87.1  ICD-9-CM: 276.1  8/23/2019            Principal Problem:    Diabetic infection of right foot ([facility] (8/24/2019)    Active Problems:    Cellulitis of right leg (8/23/2019)      Foreign body (FB) in soft tissue (8/23/2019)      Subcutaneous emphysema ([facility] (8/23/2019)      Uncontrolled type II diabetes mellitus ([facility] (8/23/2019)      Personal history of noncompliance with medical treatment (8/23/2019)      Hyponatremia (8/23/2019)      Sepsis ([address] (8/24/2019)         Diabetic right foot infection - limb threatening  S/p right 5th toe amputation and distal plantar forefoot debridement with extraction of glass (FB)  OR cultures --->gm + cocci with alpha Strep  Defer Abx modification to Hospitalists and ID       He may lose his right foot  I recommend SNF placement for 1-2 months  He needs to be complete non-weight bearing for right foot   and he won't be complaint with treatment (diabetic, dressings, off-loading, etc...)  if we send him home as he has no family or support  I wrote his outpt wound care order and follow-up with us in the discharge instructions section of [facility] care    PVD  No significant arterial stenosis noted on non-invasive arterial duple on 8/26/19    Uncontrolled DM  Inpt diabetic control  Sliding scale and semiquants    Non-Compliance with outpt DM regimen  Encourage compliance  Risks of non-compliance discussed including amputation and early death  He needs SNF            I have personally performed a face-to-face diagnostic evaluation and management  service on this patient. I have independently seen the patient. I have independently obtained the above history from the patient/family. I have independently examined the patient with above findings. I have independently reviewed data/labs for this patient and developed the above plan of care (MDM). Signed: Sandoval Knight MD, FACS

## 2019-08-28 NOTE — PROGRESS NOTES
Problem: Self Care Deficits Care Plan (Adult)  Goal: *Acute Goals and Plan of Care (Insert Text)  Description  Patient will complete lower body dressing with supervision to increase self care independence. Patient will complete toileting transfer with supervision and NWB RLE. Patient will complete bathing with supervision to increase self care independence. PROGRESSING  Patient will complete all functional transfers with supervision and NWB RLE    Timeframe: 7 visits          OCCUPATIONAL THERAPY: Daily Note and AM 8/28/2019  INPATIENT: OT Visit Days: 2  NWB RLE  Payor: MEDICAID PENDING / Plan: Elena Woodson PENDING / Product Type: Medicaid /      NAME/AGE/GENDER: Damián Monroe is a 49 y.o. male   PRIMARY DIAGNOSIS:  Cellulitis of right leg [N83.874] Diabetic infection of right foot ([address]   Diabetic infection of right foot ([address]    Procedure(s) (LRB):  Right foot debridment, removal foreign body, right 5th toe amputation  (Right)  3 Days Post-Op  ICD-10: Treatment Diagnosis:    · Difficulty in walking, Not elsewhere classified (R26.2)   Precautions/Allergies:     Patient has no known allergies. ASSESSMENT:     Mr. Leigha Esqueda presents supine in bed. NWB RLE. Patient is below baseline due to decreased balance and stnregth dur to R foot infection and WB restrictions. He was able to SPT from bed to recliner using a RW, but was not able to maintain NWB the entire time, at times he had his heel on the ground, he did not put midfoot or toes on the ground. Extra time explaining importance of NWB, patient with partial understanding. He also has some cognitive limitation when following verbal instructions during transfers, he does work hard. He will need continued OT for ADL's and transfers during ADL's. Could benefit from short term intensive rehab. Initiate OT POC.       8/28/19 1020am. Patient up in recliner he completed sponge bath,  Grooming and  from recliner as charted below.  He performed B chair push ups to assist with transfers. He is in good spirits and acknowledged that his in NWB on R LE at all times. He is sitting up in recliner all needs in reach. Patients finger nails very soiled educated him to clean under his nails while he is sitting watching TV. He agreed all needs in reach. He would continue to benefit from skilled OT services, will continue with established POC. This section established at most recent assessment   PROBLEM LIST (Impairments causing functional limitations):  1. Decreased Strength  2. Decreased ADL/Functional Activities  3. Decreased Transfer Abilities  4. Decreased Balance  5. Increased Pain  6. Decreased Activity Tolerance  7. Decreased Cognition   INTERVENTIONS PLANNED: (Benefits and precautions of occupational therapy have been discussed with the patient.)  1. Activities of daily living training  2. Neuromuscular re-eduation  3. Therapeutic activity  4. Therapeutic exercise     TREATMENT PLAN: Frequency/Duration: Follow patient 3x a week to address above goals. Rehabilitation Potential For Stated Goals: Good     REHAB RECOMMENDATIONS (at time of discharge pending progress):    Placement: It is my opinion, based on this patient's performance to date, that Mr. Kathy Nugent may benefit from participating in 1-2 additional therapy sessions in order to continue to assess for rehab potential and then make recommendation for disposition at discharge. Equipment:    None at this time              OCCUPATIONAL PROFILE AND HISTORY:   History of Present Injury/Illness (Reason for Referral):  See H&P  Past Medical History/Comorbidities:   Mr. Kathy Nugent  has a past medical history of Diabetes ([facility] Mr. Kathy Nugent  has a past surgical history that includes hx cholecystectomy.   Social History/Living Environment:   Home Environment: Private residence  # Steps to Enter: 5  Rails to Enter: Yes  Hand Rails : Right  One/Two Story Residence: One story  Living Alone: Yes(Room mate but alone during the day)  Support Systems: Friends \ neighbors  Patient Expects to be Discharged to[de-identified] Private residence  Current DME Used/Available at Home: None  Prior Level of Function/Work/Activity:  Lives with a roommate. Was independent all adl's and transfers. Number of Personal Factors/Comorbidities that affect the Plan of Care: Brief history (0):  LOW COMPLEXITY   ASSESSMENT OF OCCUPATIONAL PERFORMANCE[de-identified]   Activities of Daily Living:   Basic ADLs (From Assessment) Complex ADLs (From Assessment)   Feeding: Independent  Oral Facial Hygiene/Grooming: Independent  Bathing: Minimum assistance  Upper Body Dressing: Setup  Lower Body Dressing: Contact guard assistance  Toileting: Contact guard assistance     Grooming/Bathing/Dressing Activities of Daily Living   Grooming  Grooming Assistance: Supervision  Washing Face: Supervision  Washing Hands: Supervision  Brushing Teeth: Supervision  Brushing/Combing Hair: Supervision Cognitive Retraining  Safety/Judgement: Awareness of environment; Fall prevention   Upper Body Bathing  Bathing Assistance: Supervision  Position Performed: Seated in chair     Lower Body Bathing  Bathing Assistance: Minimum assistance  Perineal  : Stand-by assistance  Position Performed: Seated in chair  Lower Body : Minimum assistance  Position Performed: Seated in chair     Upper [address]: Supervision             Most Recent Physical Functioning:   Gross Assessment:                  Posture:     Balance:    Bed Mobility:     Wheelchair Mobility:     Transfers:               Patient Vitals for the past 6 hrs:   BP BP Patient Position SpO2 Pulse   08/28/19 0803 (!) 187/98 At rest 94 % 60       Mental Status  Neurologic State: Alert, Appropriate for age  Orientation Level: Appropriate for age  Cognition: Appropriate decision making, Appropriate for age attention/concentration, Appropriate safety awareness, Follows commands  Perception: Appears intact  Perseveration: No perseveration noted  Safety/Judgement: Awareness of environment, Fall prevention                          Physical Skills Involved:  1. Range of Motion  2. Balance  3. Strength  4. Activity Tolerance  5. Gross Motor Control  6. Skin Integrity Cognitive Skills Affected (resulting in the inability to perform in a timely and safe manner):  1. Perception  2. Command followign  Psychosocial Skills Affected:  1. Environmental Adaptation  2. Self-Awareness   Number of elements that affect the Plan of Care: 3-5:  MODERATE COMPLEXITY   CLINICAL DECISION MAKIN[address] [place], [place] [id number] AM-PAC 6 Clicks   Daily Activity Inpatient Short Form  How much help from another person does the patient currently need. .. Total A Lot A Little None   1. Putting on and taking off regular lower body clothing? ? 1   ? 2   ? 3   ? 4   2. Bathing (including washing, rinsing, drying)? ? 1   ? 2   ? 3   ? 4   3. Toileting, which includes using toilet, bedpan or urinal?   ? 1   ? 2   ? 3   ? 4   4. Putting on and taking off regular upper body clothing? ? 1   ? 2   ? 3   ? 4   5. Taking care of personal grooming such as brushing teeth? ? 1   ? 2   ? 3   ? 4   6. Eating meals? ? 1   ? 2   ? 3   ? 4   © , Trustees of [address] [place] [address], under license to [employer]. All rights reserved      Score:  Initial: 17 Most Recent: X (Date: -- )    Interpretation of Tool:  Represents activities that are increasingly more difficult (i.e. Bed mobility, Transfers, Gait). Medical Necessity:     · Skilled intervention continues to be required due to Deficits listed abvoe.   Reason for Services/Other Comments:  · Patient continues to require skilled intervention due to right foot infection and NWB   · .   Use of outcome tool(s) and clinical judgement create a POC that gives a: MODERATE COMPLEXITY         TREATMENT:   (In addition to Assessment/Re-Assessment sessions the following treatments were rendered) Pre-treatment Symptoms/Complaints:    Pain: Initial:   Pain Intensity 1: 0  Post Session:  0     Self Care: (25): Procedure(s) (per grid) utilized to improve and/or restore self-care/home management as related to dressing, bathing, grooming and hygiene. Required min visual, verbal, manual and tactile cueing to facilitate activities of daily living skills. Date:  8/28/19 Date:   Date:     Activity/Exercise Parameters Parameters Parameters   Chair pushups (backside off chair R LE EXTENDED OUT NWB) 15 reps                                               Braces/Orthotics/Lines/Etc:   · IV Had PICC LINE  Treatment/Session Assessment:    · Response to Treatment:  good,  Motivated to participate   · Interdisciplinary Collaboration:   o Occupational Therapist  o Registered Nurse  · After treatment position/precautions:   o Up in chair  o Bed/Chair-wheels locked  o Bed in low position  o Call light within reach  o RN notified   · Compliance with Program/Exercises: Compliant all of the time. · Recommendations/Intent for next treatment session: \"Next visit will focus on advancements to more challenging activities and reduction in assistance provided\".   Total Treatment Duration:25  OT Patient Time In/Time Out  Time In: 1020  Time Out: [address], OT

## 2019-08-28 NOTE — PROGRESS NOTES
Patient awake in bed this AM, offers no complaints. MD at bedside to see and evaluate patient. Will monitor.

## 2019-08-28 NOTE — PROGRESS NOTES
Hospitalist Progress Note    2019  Admit Date: 2019  6:07 PM   NAME: Gerson Wills   :  1965   MRN:  [id number]   Attending: Sera Thomas DO  PCP:  None    SUBJECTIVE:   Patient is a 49years old male with history of NDDM and non complicance presents with redness, swelling, pain of R foot and low grade fever for last 5 days. He do not recall injuries but he walks bare foot not infrequently. He has no PCP, on no medications for years despite his need for low dose insulin 2-3 y ago. Surgical debridement and removal of glass foreign body, and R 5th toe amputation .      : Johnny Jones. Surgery and ID following: continue vanc/zosyn and NWB for now. Await final cultures. No n/v. Minimal foot soreness. No further chills or sweats. Labs wnl. Elevated BPs - starting lisinopril  Micro with staph aureus and alpha strep on wound culture, strep anginosus on BCx    Review of Systems negative with exception of pertinent positives noted above  PHYSICAL EXAM     Visit Vitals  BP (!) 187/98 (BP 1 Location: Left arm, BP Patient Position: At rest)   Pulse 60   Temp 97.4 °F (36.3 °C)   Resp 18   Ht 5' 9\" (1.753 m)   Wt 95.3 kg (210 lb)   SpO2 94%   BMI 31.01 kg/m²      Temp (24hrs), Av.3 °F (36.3 °C), Min:96.7 °F (35.9 °C), Max:98.9 °F (37.2 °C)    Oxygen Therapy  O2 Sat (%): 94 % (19 0803)  O2 Device: Room air (19 0720)  O2 Flow Rate (L/min): 2 l/min (19 0930)    Intake/Output Summary (Last 24 hours) at 2019 0931  Last data filed at 2019 0318  Gross per 24 hour   Intake 750 ml   Output 1125 ml   Net -375 ml      General: No acute distress    Lungs:  CTA Bilaterally.    Heart:  Regular rate and rhythm,  No murmur, rub, or gallop  Abdomen: Soft, Non distended, Non tender, Positive bowel sounds  Extremities: Bandaging in place c/d/i  Neurologic:  No focal deficits    ASSESSMENT      Active Hospital Problems    Diagnosis Date Noted    Di[facility] infection of right foot ([facility] 2019    Sepsis ([facility] 08/24/2019    Cellulitis of right leg 08/23/2019    Foreign body (FB) in soft tissue 08/23/2019    Subcutaneous emphysema ([facility] 08/23/2019    Uncontrolled type II diabetes mellitus ([facility] 08/23/2019    Personal history of noncompliance with medical treatment 08/23/2019    Hyponatremia 08/23/2019     Plan:    # Sepsis, R diabetic foot infection, strep anginosus bacteremia   - Surgery and ID following. Continue vanc/zosyn. - CM following for protracted antibiotics and PT needs - unlikely to get SNF placement due to self pay status. - Strep anginosus bacteremia, intraop wound cx alpha strep and staph aureus, ID/SN pending     # Paroxysmal atrial fibrillation  - observed during surgery and likely precipitated by sepsis  - patient spontaneously converted back to NSR  - no current indication for anticoagulation  - ordered cardiac telemetry  - sepsis management, as above    # DM type II, uncontrolled  - cannot afford any medications and does not take anything at home  - case management consult for assistance   - Lantus 10 units qdaily  - Humalog SSI and serial CBGs  - goal CBG between 140 and 180 while inpatient  - diabetes education consultation  - repeat HgbA1c of 9.7, patient would most benefit with starting on insulin regimen after hospitalization    # HTN: start lisinopril    F/E/N: discontinue fluids, replete electrolytes as needed, diabetic diet    Ppx: Lovenox for VTE    Code Status: FULL CODE    Disposition: abx and treatment course pending cultures, ID recs. Surgery desires SNF for NWB but this may not be an option due to self pay.  CM following    Signed By: Mu Flanagan MD     August 28, 2019

## 2019-08-29 LAB
ANION GAP SERPL CALC-SCNC: 5 MMOL/L (ref 7–16)
BACTERIA SPEC CULT: ABNORMAL
BUN SERPL-MCNC: 9 MG/DL (ref 6–23)
CALCIUM SERPL-MCNC: 8.4 MG/DL (ref 8.3–10.4)
CHLORIDE SERPL-SCNC: 100 MMOL/L (ref 98–107)
CO2 SERPL-SCNC: 30 MMOL/L (ref 21–32)
CREAT SERPL-MCNC: 0.83 MG/DL (ref 0.8–1.5)
ERYTHROCYTE [DISTWIDTH] IN BLOOD BY AUTOMATED COUNT: 12.7 % (ref 11.9–14.6)
GLUCOSE BLD STRIP.AUTO-MCNC: 102 MG/DL (ref 65–100)
GLUCOSE BLD STRIP.AUTO-MCNC: 143 MG/DL (ref 65–100)
GLUCOSE BLD STRIP.AUTO-MCNC: 144 MG/DL (ref 65–100)
GLUCOSE BLD STRIP.AUTO-MCNC: 192 MG/DL (ref 65–100)
GLUCOSE SERPL-MCNC: 77 MG/DL (ref 65–100)
GRAM STN SPEC: ABNORMAL
HCT VFR BLD AUTO: 32.3 % (ref 41.1–50.3)
HGB BLD-MCNC: 10.9 G/DL (ref 13.6–17.2)
MCH RBC QN AUTO: 28.2 PG (ref 26.1–32.9)
MCHC RBC AUTO-ENTMCNC: 33.7 G/DL (ref 31.4–35)
MCV RBC AUTO: 83.5 FL (ref 79.6–97.8)
NRBC # BLD: 0 K/UL (ref 0–0.2)
PLATELET # BLD AUTO: 202 K/UL (ref 150–450)
PMV BLD AUTO: 9.9 FL (ref 9.4–12.3)
POTASSIUM SERPL-SCNC: 3.6 MMOL/L (ref 3.5–5.1)
RBC # BLD AUTO: 3.87 M/UL (ref 4.23–5.6)
SERVICE CMNT-IMP: ABNORMAL
SERVICE CMNT-IMP: ABNORMAL
SODIUM SERPL-SCNC: 135 MMOL/L (ref 136–145)
WBC # BLD AUTO: 9.3 K/UL (ref 4.3–11.1)

## 2019-08-29 PROCEDURE — 74011636637 HC RX REV CODE- 636/637: Performed by: SURGERY

## 2019-08-29 PROCEDURE — 74011250636 HC RX REV CODE- 250/636: Performed by: INTERNAL MEDICINE

## 2019-08-29 PROCEDURE — 82962 GLUCOSE BLOOD TEST: CPT

## 2019-08-29 PROCEDURE — 74011250636 HC RX REV CODE- 250/636: Performed by: SURGERY

## 2019-08-29 PROCEDURE — 80048 BASIC METABOLIC PNL TOTAL CA: CPT

## 2019-08-29 PROCEDURE — 74011250637 HC RX REV CODE- 250/637: Performed by: FAMILY MEDICINE

## 2019-08-29 PROCEDURE — 74011000258 HC RX REV CODE- 258: Performed by: INTERNAL MEDICINE

## 2019-08-29 PROCEDURE — 74011250637 HC RX REV CODE- 250/637: Performed by: HOSPITALIST

## 2019-08-29 PROCEDURE — 97110 THERAPEUTIC EXERCISES: CPT

## 2019-08-29 PROCEDURE — 85027 COMPLETE CBC AUTOMATED: CPT

## 2019-08-29 PROCEDURE — 65270000029 HC RM PRIVATE

## 2019-08-29 RX ORDER — HYDROCHLOROTHIAZIDE 12.5 MG/1
12.5 CAPSULE ORAL DAILY
Status: DISCONTINUED | OUTPATIENT
Start: 2019-08-29 | End: 2019-08-30

## 2019-08-29 RX ADMIN — LISINOPRIL 40 MG: 20 TABLET ORAL at 08:23

## 2019-08-29 RX ADMIN — CEFTRIAXONE SODIUM 2 G: 2 INJECTION, POWDER, FOR SOLUTION INTRAMUSCULAR; INTRAVENOUS at 16:04

## 2019-08-29 RX ADMIN — HYDROCHLOROTHIAZIDE 12.5 MG: 12.5 CAPSULE ORAL at 08:57

## 2019-08-29 RX ADMIN — Medication 10 ML: at 21:25

## 2019-08-29 RX ADMIN — Medication 10 ML: at 13:35

## 2019-08-29 RX ADMIN — VANCOMYCIN HYDROCHLORIDE 1000 MG: 1 INJECTION, POWDER, LYOPHILIZED, FOR SOLUTION INTRAVENOUS at 09:24

## 2019-08-29 RX ADMIN — Medication 600 UNITS: at 13:36

## 2019-08-29 RX ADMIN — Medication 600 UNITS: at 05:07

## 2019-08-29 RX ADMIN — Medication 1 ML: at 08:28

## 2019-08-29 RX ADMIN — INSULIN GLARGINE 10 UNITS: 100 INJECTION, SOLUTION SUBCUTANEOUS at 08:25

## 2019-08-29 RX ADMIN — INSULIN LISPRO 3 UNITS: 100 INJECTION, SOLUTION INTRAVENOUS; SUBCUTANEOUS at 22:32

## 2019-08-29 RX ADMIN — CLONIDINE HYDROCHLORIDE 0.1 MG: 0.1 TABLET ORAL at 20:34

## 2019-08-29 RX ADMIN — Medication 10 ML: at 05:07

## 2019-08-29 RX ADMIN — Medication 10 ML: at 05:08

## 2019-08-29 RX ADMIN — Medication 600 UNITS: at 21:25

## 2019-08-29 RX ADMIN — ENOXAPARIN SODIUM 40 MG: 40 INJECTION SUBCUTANEOUS at 08:21

## 2019-08-29 NOTE — DIABETES MGMT
Patient's blood glucose ranged  yesterday with patient receiving Lantus 10 units and Humalog 7 units. Blood glucose 102 this morning. Hgb 10.9. Current regimen: Lantus 10 units daily and Humalog SSI.

## 2019-08-29 NOTE — PROGRESS NOTES
Interdisciplinary team rounds were held 8/29/2019 with the following team members:Care Management, Nursing, Nutrition, Pharmacy, Physical Therapy and Physician. Plan of care discussed. See clinical pathway and/or care plan for interventions and desired outcomes.

## 2019-08-29 NOTE — PROGRESS NOTES
Per ID note as of this am..... Cultures reviewed: staph aureus and strep in wound culture. Strep anginosus bacteremia. Sensitivities still pending on Staph.         waiting for final culture sensitivities to determine proper abx and d/c plans. Pt will either have to d/c home w/HH or stay in the hospital to receive abx (if they are iv). There is no financial way the pt can go to a facility for rehab, he is a \"self pay\"pt and has no means to pay. He qualifies for Medicaid, per [facility], but he needs to send in documentation to start the process, which could take many months. Pt is aware and agreeable with the plan.

## 2019-08-29 NOTE — PROGRESS NOTES
Hospitalist Progress Note    2019  Admit Date: 2019  6:07 PM   NAME: Martin Ernandez   :  1965   MRN:  [id number]   Attending: Nagi Hammer DO  PCP:  None    SUBJECTIVE:   Patient is a 49years old male with history of NDDM and non complicance presents with redness, swelling, pain of R foot and low grade fever for last 5 days. He do not recall injuries but he walks bare foot not infrequently. He has no PCP, on no medications for years despite his need for low dose insulin 2-3 y ago. Surgical debridement and removal of glass foreign body, and R 5th toe amputation .      : Alessandra Denis. Surgery and ID following: continue vanco, zosyn changed to rocephin yesterday. NWB per surgery  Await final cultures. No n/v. Minimal foot soreness. No further chills or sweats. Labs unremarkable. BP still high on new lisinopril. Adding hctz. Review of Systems negative with exception of pertinent positives noted above  PHYSICAL EXAM     Visit Vitals  /80 (BP 1 Location: Left arm, BP Patient Position: At rest;Supine)   Pulse 60   Temp 97.9 °F (36.6 °C)   Resp 15   Ht 5' 9\" (1.753 m)   Wt 95.3 kg (210 lb)   SpO2 94%   BMI 31.01 kg/m²      Temp (24hrs), Av.9 °F (36.6 °C), Min:97.6 °F (36.4 °C), Max:98.4 °F (36.9 °C)    Oxygen Therapy  O2 Sat (%): 94 % (19 0731)  O2 Device: Room air (19 0800)  O2 Flow Rate (L/min): 2 l/min (19 0930)    Intake/Output Summary (Last 24 hours) at 2019 0834  Last data filed at 2019 0658  Gross per 24 hour   Intake 2060 ml   Output 2500 ml   Net -440 ml      General: No acute distress    Lungs:  CTA Bilaterally.    Heart:  Regular rate and rhythm,  No murmur, rub, or gallop  Abdomen: Soft, Non distended, Non tender, Positive bowel sounds  Extremities: Bandaging in place c/d/i  Neurologic:  No focal deficits    ASSESSMENT      Active Hospital Problems    Diagnosis Date Noted    Diabetic infection of right foot ([facility] 2019    Sepsis ([facility] 08/24/2019    Cellulitis of right leg 08/23/2019    Foreign body (FB) in soft tissue 08/23/2019    Subcutaneous emphysema ([facility] 08/23/2019    Uncontrolled type II diabetes mellitus ([facility] 08/23/2019    Personal history of noncompliance with medical treatment 08/23/2019    Hyponatremia 08/23/2019     Plan:    # Sepsis, R diabetic foot infection, strep anginosus bacteremia   - Surgery and ID following. Continue vanc/rocephin.  - CM following for protracted antibiotics and PT needs: unlikely to get SNF placement due to self pay status. - Strep anginosus bacteremia, intraop wound cx alpha strep and staph aureus, ID/SN pending     # Paroxysmal atrial fibrillation  - observed during surgery and likely precipitated by sepsis  - patient spontaneously converted back to NSR  - no current indication for anticoagulation  - ordered cardiac telemetry  - sepsis management, as above    # DM type II, uncontrolled  - cannot afford any medications and does not take anything at home  - case management consult for assistance   - Lantus 10 units qdaily  - Humalog SSI and serial CBGs  - goal CBG between 140 and 180 while inpatient  - diabetes education consultation  - repeat HgbA1c of 9.7, patient would most benefit with starting on insulin regimen after hospitalization    # HTN: add hctz to lisinopril    F/E/N: off fluids, replete electrolytes as needed, diabetic diet    Ppx: Lovenox for VTE    Code Status: FULL CODE    Disposition: abx and treatment course pending cultures, ID recs. Surgery desires SNF for NWB but this may not be an option due to self pay.  CM following    Signed By: Parag Love MD     August 29, 2019

## 2019-08-29 NOTE — PROGRESS NOTES
H&P/Consult Note/Progress Note/Office Note:   Austen Govea  MRN: [id number]  :1965  Age:54 y.o.    HPI: Austen Govea is a 49 y.o. male who was admitted on 19 from the ER by the Hospitalists with a diabetic right foot infection. He reported progressive, constant, severe right foot pain for at least 2 days. He walks barefoot. Nothing made pain better. It is worse with palpation or standing. He had associated low grade fever. He was placed on IV Abx by Hospitalists  He has h/o DM and non- compliance (No primary care doctor, not on DM meds)  Bld Cxs x 2 negative so far  Xray as below      19 Xray right foot, 3 views  Hx: Redness and swelling of the right foot. Pain     There is a question of a triangular shaped foreign body along the plantar surface of the right foot   at the level of the third proximal phalanx. There is subcutaneous emphysema, raising possibility for superimposed infection. No fracture or dislocation.     IMPRESSION: Findings suggestive of triangular radiopaque foreign body within the  plantar soft tissues at the level of the third proximal phalanx. There is also  subcutaneous emphysema seen extending from the first through fifth digits at the  level of the proximal phalanges    19 Arterial duplex  The right and left lower extremity segmental blood pressures are  fairly symmetric and unremarkable. KAROLINA:  Right = 1.28              Left = 1.28     RIGHT LOWER EXTREMITY:  Peak systolic velocities-- within normal limits. Normal  triphasic waveform throughout.     LEFT LOWER EXTREMITY:  Peak systolic velocities-- within normal limits. Mildly  elevated distal popliteal artery velocity.  Normal triphasic waveforms throughout.     ABDOMEN:  No evidence of aneurysm.     IMPRESSION:  No significant arterial stenosis identified.             19 POD1 right foot debridement, abscess drainage, 5th toe amputation, and FB removal  19 POD2 No sign arterial stenosis on non-invasive vasc study   8/28/19 POD3 cultures from OR with alpha Strep  8/29/19 POD4 On IV Vanc; daily dressing changes; off-loading; case management working on [employer] and SNF placement          Past Medical History:   Diagnosis Date    Diabetes ([address]      Past Surgical History:   Procedure Laterality Date    HX CHOLECYSTECTOMY       Current Facility-Administered Medications   Medication Dose Route Frequency    lisinopril (PRINIVIL, ZESTRIL) tablet 40 mg  40 mg Oral DAILY    cefTRIAXone (ROCEPHIN) 2 g in 0.9% sodium chloride (MBP/ADV) 50 mL  2 g IntraVENous Q24H    vancomycin (VANCOCIN) 1,000 mg in 0.9% sodium chloride (MBP/ADV) 250 mL  1,000 mg IntraVENous Q12H    sodium chloride (NS) flush 10 mL  10 mL InterCATHeter Q8H    heparin (porcine) pf 600 Units  600 Units InterCATHeter Q8H    sodium chloride (NS) flush 10 mL  10 mL InterCATHeter PRN    heparin (porcine) pf 600 Units  600 Units InterCATHeter PRN    cloNIDine HCl (CATAPRES) tablet 0.1 mg  0.1 mg Oral BID PRN    insulin glargine (LANTUS) injection 10 Units  10 Units SubCUTAneous DAILY    povidone-iodine (BETADINE) 10 % topical solution   Topical DAILY    sodium chloride (NS) flush 5-40 mL  5-40 mL IntraVENous Q8H    sodium chloride (NS) flush 5-40 mL  5-40 mL IntraVENous PRN    HYDROcodone-acetaminophen (NORCO) 7.5-325 mg per tablet 1 Tab  1 Tab Oral Q4H PRN    morphine injection 2 mg  2 mg IntraVENous Q4H PRN    naloxone (NARCAN) injection 0.4 mg  0.4 mg IntraVENous PRN    diphenhydrAMINE (BENADRYL) capsule 25 mg  25 mg Oral Q4H PRN    promethazine (PHENERGAN) tablet 25 mg  25 mg Oral Q6H PRN    magnesium hydroxide (MILK OF MAGNESIA) 400 mg/5 mL oral suspension 30 mL  30 mL Oral DAILY PRN    nicotine (NICODERM CQ) 21 mg/24 hr patch 1 Patch  1 Patch TransDERmal DAILY PRN    enoxaparin (LOVENOX) injection 40 mg  40 mg SubCUTAneous Q24H    insulin lispro (HUMALOG) injection   SubCUTAneous AC&HS    acetaminophen (TYLENOL) tablet 650 mg  650 mg Oral Q4H PRN     Patient has no known allergies. Social History     Socioeconomic History    Marital status: SINGLE     Spouse name: Not on file    Number of children: Not on file    Years of education: Not on file    Highest education level: Not on file   Tobacco Use    Smoking status: Current Every Day Smoker   Substance and Sexual Activity    Alcohol use: No    Sexual activity: Never     Social History     Tobacco Use   Smoking Status Current Every Day Smoker     History reviewed. No pertinent family history. ROS: The patient has no difficulty with chest pain or shortness of breath. No fever or chills. Comprehensive review of systems was otherwise unremarkable except as noted above. Physical Exam:   Visit Vitals  BP (!) 160/96 (BP 1 Location: Left arm, BP Patient Position: At rest)   Pulse 66   Temp 97.8 °F (36.6 °C)   Resp 18   Ht 5' 9\" (1.753 m)   Wt 210 lb (95.3 kg)   SpO2 96%   BMI 31.01 kg/m²     Vitals:    08/28/19 2018 08/28/19 2318 08/28/19 2336 08/29/19 0337   BP: 163/78 195/61 (!) 180/100 (!) 160/96   Pulse: 63 64  66   Resp: 18 18  18   Temp: 97.6 °F (36.4 °C) 97.9 °F (36.6 °C)  97.8 °F (36.6 °C)   SpO2: 95% 97%  96%   Weight:       Height:         08/28 1901 - 08/29 0700  In: -   Out: 1400 [Urine:1400]  08/27 0701 - 08/28 1900  In: 1000 [P.O.:300; I.V.:700]  Out: 1625 [Urine:1625]    Constitutional: Alert, oriented, cooperative patient in no acute distress; appears stated age    Eyes:Sclera are clear. EOMs intact  ENMT: no external lesions gross hearing normal; no obvious neck masses, no ear or lip lesions, nares normal  CV: RRR. Normal perfusion  Resp: No JVD. Breathing is  non-labored; no audible wheezing. GI: soft and non-distended     Musculoskeletal: unremarkable with normal function. No embolic signs or cyanosis. Right 5th toe amputation. Exposed tendons on plantar right foot  Some early granulation appearing in wound  Tenuous right 2nd, 3rd, and 4th toes.   Less cellulitic change across volar forefoot  No fluctuance; No crepitus, No necrosis or debris  +edema  Hard to palpate pedal pulses    Neuro:  Oriented; moves all 4; no focal deficits  Psychiatric: normal affect and mood, no memory impairment                                Recent vitals (if inpt):  Patient Vitals for the past 24 hrs:   BP Temp Pulse Resp SpO2   08/29/19 0337 (!) 160/96 97.8 °F (36.6 °C) 66 18 96 %   08/28/19 2336 (!) 180/100 -- -- -- --   08/28/19 2318 195/61 97.9 °F (36.6 °C) 64 18 97 %   08/28/19 2018 163/78 97.6 °F (36.4 °C) 63 18 95 %   08/28/19 1637 190/88 97.7 °F (36.5 °C) 60 18 95 %   08/28/19 1223 151/77 98.4 °F (36.9 °C) 61 18 96 %   08/28/19 0803 (!) 187/98 97.4 °F (36.3 °C) 60 18 94 %       Labs:  Recent Labs     08/29/19  0516   WBC 9.3   HGB 10.9*      *   K 3.6      CO2 30   BUN 9   CREA 0.83   GLU 77       Lab Results   Component Value Date/Time    WBC 9.3 08/29/2019 05:16 AM    HGB 10.9 (L) 08/29/2019 05:16 AM    PLATELET 323 11/80/1349 05:16 AM    Sodium 135 (L) 08/29/2019 05:16 AM    Potassium 3.6 08/29/2019 05:16 AM    Chloride 100 08/29/2019 05:16 AM    CO2 30 08/29/2019 05:16 AM    BUN 9 08/29/2019 05:16 AM    Creatinine 0.83 08/29/2019 05:16 AM    Glucose 77 08/29/2019 05:16 AM    Bilirubin, total 1.5 (H) 08/23/2019 05:54 PM    AST (SGOT) 10 (L) 08/23/2019 05:54 PM    ALT (SGPT) 14 08/23/2019 05:54 PM    Alk. phosphatase 129 08/23/2019 05:54 PM       CT Results  (Last 48 hours)    None        chest X-ray      I reviewed recent labs, recent radiologic studies, and pertinent records including other doctor notes if needed. I independently reviewed radiology images for studies I described above or studies I have ordered.    Admission date (for inpatients): 8/23/2019   * No surgery found *  Procedure(s):  Right foot debridment, removal foreign body, right 5th toe amputation     ASSESSMENT/PLAN:  Problem List  Date Reviewed: 8/24/2019          Codes Class Noted    * (Principal) Diabetic infection of right foot ([address] ICD-10-CM: E11.628, L08.9  ICD-9-CM: 250.80, 686.9  8/24/2019        Sepsis ([address] ICD-10-CM: A41.9  ICD-9-CM: 038.9, 995.91  8/24/2019        Cellulitis of right leg ICD-10-CM: L03.115  ICD-9-CM: 682.6  8/23/2019        Foreign body (FB) in soft tissue ICD-10-CM: M79.5  ICD-9-CM: 729.6  8/23/2019        Subcutaneous emphysema ([address] ICD-10-CM: T79. 7XXA  ICD-9-CM: 958.7  8/23/2019        Uncontrolled type II diabetes mellitus ([address] ICD-10-CM: E11.65  ICD-9-CM: 250.02  8/23/2019        Personal history of noncompliance with medical treatment ICD-10-CM: Z91.19  ICD-9-CM: V15.81  8/23/2019        Hyponatremia ICD-10-CM: E87.1  ICD-9-CM: 276.1  8/23/2019            Principal Problem:    Diabetic infection of right foot ([address] (8/24/2019)    Active Problems:    Cellulitis of right leg (8/23/2019)      Foreign body (FB) in soft tissue (8/23/2019)      Subcutaneous emphysema ([address] (8/23/2019)      Uncontrolled type II diabetes mellitus ([address] (8/23/2019)      Personal history of noncompliance with medical treatment (8/23/2019)      Hyponatremia (8/23/2019)      Sepsis ([facility] (8/24/2019)         Diabetic right foot infection - limb threatening  S/p right 5th toe amputation and distal plantar forefoot debridement with extraction of glass (FB)  OR cultures --->gm + cocci with alpha Strep  Defer Abx modification to Hospitalists and ID       He may lose his right foot  I recommend SNF placement for 1-2 months  He needs to be complete non-weight bearing for right foot   and he won't be complaint with treatment (diabetic, dressings, off-loading, etc...)  if we send him home as he has no family or support  I wrote his outpt wound care order and follow-up with us in the discharge instructions section of [facility] care    PVD  No significant arterial stenosis noted on non-invasive arterial duple on 8/26/19    Uncontrolled DM  Inpt diabetic control  Sliding scale and semiquants    Non-Compliance with outpt DM regimen  Encourage compliance  Risks of non-compliance discussed including amputation and early death  He needs SNF            I have personally performed a face-to-face diagnostic evaluation and management  service on this patient. I have independently seen the patient. I have independently obtained the above history from the patient/family. I have independently examined the patient with above findings. I have independently reviewed data/labs for this patient and developed the above plan of care (MDM). Signed: Sb He.  Lizbeth Olmstead MD, FACS

## 2019-08-29 NOTE — PROGRESS NOTES
Cultures reviewed: staph aureus and strep in wound culture. Strep anginosus bacteremia. Sensitivities still pending on Staph    Culture with MSSA and Strep anginosus so can treat both with Ceftriaxone 2 gm daily. Plan 2 week course thru 9/8/19. Thor Monson Disposition remains undefined; uninsured. Surgery prefer SNF and given poor DM control and need wound care and care PICC, I would favor that as well. If not can do CTX daily at infusion center or at home with home health but if  Uninsured, this may limit options.

## 2019-08-29 NOTE — PROGRESS NOTES
Pharmacokinetic Consult to Pharmacist:    Lab Results   Component Value Date/Time    Vancomycin,trough 17.5 08/28/2019 07:57 PM       Please continue current dose of Vancomycin 1 gram IV every 12 hours.       Edgardo WinnD, BCPS

## 2019-08-29 NOTE — PROGRESS NOTES
Patient received sitting up in bedside recliner, offers no complaints. Shift assessment completed. Patient assisted x1 and use of walker back to bed, will monitor.

## 2019-08-29 NOTE — PROGRESS NOTES
Problem: Mobility Impaired (Adult and Pediatric)  Goal: *Acute Goals and Plan of Care (Insert Text)  Description  DISCHARGE GOALS :  (1.)Mr. Henry Castaneda will move from supine to sit and sit to supine  with MODIFIED INDEPENDENCE. (2.)Mr. Henry Castaneda will transfer from bed to chair and chair to bed with SUPERVISION using a walker NWB RIGHT LE. PLAN TO SET AMBULATION & STAIR GOALS WHEN SURGEON ALLOWS PATIENT TO USE AN OFF LOADING BOOT TO SAFELY ALLOW PT TO ATTEMPT THIS ACTIVITY SAFELY. ________________________________________________________________________________________________   Outcome: Progressing Towards Goal     PHYSICAL THERAPY: Daily Note and AM 8/29/2019  INPATIENT: PT Visit Days : 4  Payor: MEDICAID PENDING / Plan: Krystle Schilling PENDING / Product Type: Medicaid /       NAME/AGE/GENDER: Hailey Kaur is a 49 y.o. male   PRIMARY DIAGNOSIS: Cellulitis of right leg [P95.052] Diabetic infection of right foot ([address]   Diabetic infection of right foot ([address]    Procedure(s) (LRB):  Right foot debridment, removal foreign body, right 5th toe amputation  (Right)  4 Days Post-Op  ICD-10: Treatment Diagnosis:    · Generalized Muscle Weakness (M62.81)  · Other lack of cordination (R27.8)   Precaution/Allergies:  Patient has no known allergies. ASSESSMENT:     Mr. Henry Castaneda still has difficulty with transitioning to standing, having a tendency to touch heel briefly when coming to stand & sitting back down. Worked on repeated sit<>stand with emphasis on NWB right LE with also repeated transfers bed<>recliner. PT resisted bilateral UE shoulder flex with triceps extension 3 sets of 5 reps, need to show pt bench press motion with t-band on follow up. PT left message on kaylah's VM to request an off loading boot for right foot. 8/28 -  Patient transferred supine to sit and performed UE bench presses, overhead presses and bicep curls with red theraband.  We were getting ready to practice sit to stand but surgeon came in an wanted to undress his foot for pictures. Patient left supine and RN notified that MD wanted her to redress it. Spoke with Dr. Karine Torres regarding the off loading boot. He does not want ambulation. I explained that our theory was just to have the boot to allow for safer transfers to/from wheelchair since patient wants to put his foot down. He agreed to orthotics consult to see about arranging a boot. NO PRESSURE ON RIGHT FOOT PAST ARCH. ONLY HEEL AND MIDFOOT. Ama to order. Need to talk with nursing director to get the okay before ordering anything since we will get billed. This section established at most recent assessment   PROBLEM LIST (Impairments causing functional limitations):  1. Decreased Strength  2. Decreased ADL/Functional Activities  3. Decreased Transfer Abilities  4. Decreased Ambulation Ability/Technique  5. Decreased Balance  6. Decreased Activity Tolerance   INTERVENTIONS PLANNED: (Benefits and precautions of physical therapy have been discussed with the patient.)  1. Balance Exercise  2. Bed Mobility  3. Therapeutic Activites  4. Therapeutic Exercise/Strengthening  5. Transfer Training     TREATMENT PLAN: Frequency/Duration: daily for duration of hospital stay  Rehabilitation Potential For Stated Goals: Good     REHAB RECOMMENDATIONS (at time of discharge pending progress):    Placement: It is my opinion, based on this patient's performance to date, that Mr. Henry Castaneda may benefit from intensive therapy at an [address] [place] [address] after discharge due to potential to make ongoing and sustainable functional improvement that is of practical value. .  Equipment:    To be determined               HISTORY:   History of Present Injury/Illness (Reason for Referral):  Patient is a 49years old male with history of NDDM and non complicance presents with redness, swelling, pain of R foot and low grade fever for last 5 days. He do not recall injuries but he walks bare foot not infrequently.  He has no PCP, on no medications for years despite his need for low dose insulin 2-3 y ago. Past Medical History/Comorbidities:   Mr. Asha Aponte  has a past medical history of Diabetes ([address] Mr. Asha Aponte  has a past surgical history that includes hx cholecystectomy. Social History/Living Environment:   Home Environment: Private residence  # Steps to Enter: 5  Rails to Enter: Yes  Hand Rails : Right  One/Two Story Residence: One story  Living Alone: Yes(Room mate but alone during the day)  Support Systems: Friends \ neighbors  Patient Expects to be Discharged to[de-identified] Private residence  Current DME Used/Available at Home: None  Prior Level of Function/Work/Activity:  Pt was independent without an assistive device prior to this admission. Personal Factors: Other factors that influence how disability is experienced by the patient:  current & PMH    Number of Personal Factors/Comorbidities that affect the Plan of Care: 3+: HIGH COMPLEXITY   EXAMINATION:   Most Recent Physical Functioning:   Gross Assessment:                       Balance:  Sitting: Intact Bed Mobility:  Supine to Sit: Supervision  Sit to Supine: Supervision       Transfers:     Gait:  Right Side Weight Bearing: Non-weight bearing      Functional Mobility:         Transfers:  min        Bed Mobility:  sba   Body Structures Involved:  1. Metabolic  2. Muscles Body Functions Affected:  1. Movement Related  2. Metobolic/Endocrine Activities and Participation Affected:  1. General Tasks and Demands  2. Mobility   Number of elements that affect the Plan of Care: 4+: HIGH COMPLEXITY   CLINICAL PRESENTATION:   Presentation: Evolving clinical presentation with unstable and unpredictable characteristics: HIGH COMPLEXITY   CLINICAL DECISION MAKIN [facility] Mobility Inpatient Short Form  How much difficulty does the patient currently have. .. Unable A Lot A Little None   1.   Turning over in bed (including adjusting bedclothes, sheets and blankets)? ? 1   ? 2   ? 3   ? 4   2. Sitting down on and standing up from a chair with arms ( e.g., wheelchair, bedside commode, etc.)   ? 1   ? 2   ? 3   ? 4   3. Moving from lying on back to sitting on the side of the bed?   ? 1   ? 2   ? 3   ? 4   How much help from another person does the patient currently need. .. Total A Lot A Little None   4. Moving to and from a bed to a chair (including a wheelchair)? ? 1   ? 2   ? 3   ? 4   5. Need to walk in hospital room? ? 1   ? 2   ? 3   ? 4   6. Climbing 3-5 steps with a railing? ? 1   ? 2   ? 3   ? 4   © 2007, Trustees of [address] [place] [address] [address] [id number], under license to [employer]. All rights reserved      Score:  Initial: 14 Most Recent: X (Date: -- )    Interpretation of Tool:  Represents activities that are increasingly more difficult (i.e. Bed mobility, Transfers, Gait). Medical Necessity:     · Patient is expected to demonstrate progress in strength, balance, coordination and functional technique  ·  to decrease assistance required with bed mobility & transfers. · .  Reason for Services/Other Comments:  · Patient continues to require skilled intervention due to pt being unsafe with functional mobility   · . Use of outcome tool(s) and clinical judgement create a POC that gives a: Difficult prediction of patient's progress: HIGH COMPLEXITY            TREATMENT:   (In addition to Assessment/Re-Assessment sessions the following treatments were rendered)   Pre-treatment Symptoms/Complaints:  \" how am I doing ? \"  Pain: Initial: numeric scale     Post Session: 0/10     Therapeutic Exercise: (15 Minutes):  Exercises per grid below to improve mobility and strength. Required minimal verbal cues to promote proper body alignment.        Braces/Orthotics/Lines/Etc:   · IV  Treatment/Session Assessment:    · Response to Treatment:  Tolerated fairly well  · Interdisciplinary Collaboration:   o Registered Nurse  o Physician  o Social Worker  o Rehabilitation Attendant  · After treatment position/precautions:   o Up in chair  o Bed/Chair-wheels locked  o Call light within reach  o RN notified   · Compliance with Program/Exercises: Compliant most of the time, Will assess as treatment progresses  · Recommendations/Intent for next treatment session: \"Next visit will focus on reduction in assistance provided\".   Total Treatment Duration:  PT Patient Time In/Time Out  Time In: 1010  Time Out: Joann 25, PT

## 2019-08-30 ENCOUNTER — HOME HEALTH ADMISSION (OUTPATIENT)
Dept: HOME HEALTH SERVICES | Facility: HOME HEALTH | Age: 54
End: 2019-08-30

## 2019-08-30 LAB
GLUCOSE BLD STRIP.AUTO-MCNC: 106 MG/DL (ref 65–100)
GLUCOSE BLD STRIP.AUTO-MCNC: 145 MG/DL (ref 65–100)
GLUCOSE BLD STRIP.AUTO-MCNC: 180 MG/DL (ref 65–100)
GLUCOSE BLD STRIP.AUTO-MCNC: 180 MG/DL (ref 65–100)

## 2019-08-30 PROCEDURE — 74011250636 HC RX REV CODE- 250/636: Performed by: INTERNAL MEDICINE

## 2019-08-30 PROCEDURE — 74011636637 HC RX REV CODE- 636/637: Performed by: SURGERY

## 2019-08-30 PROCEDURE — 74011250637 HC RX REV CODE- 250/637: Performed by: FAMILY MEDICINE

## 2019-08-30 PROCEDURE — 65270000029 HC RM PRIVATE

## 2019-08-30 PROCEDURE — 74011000258 HC RX REV CODE- 258: Performed by: INTERNAL MEDICINE

## 2019-08-30 PROCEDURE — 97530 THERAPEUTIC ACTIVITIES: CPT

## 2019-08-30 PROCEDURE — 74011250637 HC RX REV CODE- 250/637: Performed by: SURGERY

## 2019-08-30 PROCEDURE — 74011250637 HC RX REV CODE- 250/637: Performed by: HOSPITALIST

## 2019-08-30 PROCEDURE — 97535 SELF CARE MNGMENT TRAINING: CPT

## 2019-08-30 PROCEDURE — 74011250636 HC RX REV CODE- 250/636: Performed by: SURGERY

## 2019-08-30 PROCEDURE — 82962 GLUCOSE BLOOD TEST: CPT

## 2019-08-30 PROCEDURE — 74011250636 HC RX REV CODE- 250/636: Performed by: FAMILY MEDICINE

## 2019-08-30 RX ORDER — HYDRALAZINE HYDROCHLORIDE 20 MG/ML
20 INJECTION INTRAMUSCULAR; INTRAVENOUS ONCE
Status: COMPLETED | OUTPATIENT
Start: 2019-08-30 | End: 2019-08-30

## 2019-08-30 RX ORDER — HYDROCHLOROTHIAZIDE 12.5 MG/1
25 CAPSULE ORAL DAILY
Status: DISCONTINUED | OUTPATIENT
Start: 2019-08-31 | End: 2019-09-01

## 2019-08-30 RX ADMIN — Medication 600 UNITS: at 06:00

## 2019-08-30 RX ADMIN — Medication 10 ML: at 17:27

## 2019-08-30 RX ADMIN — INSULIN GLARGINE 10 UNITS: 100 INJECTION, SOLUTION SUBCUTANEOUS at 09:00

## 2019-08-30 RX ADMIN — HYDRALAZINE HYDROCHLORIDE 20 MG: 20 INJECTION INTRAMUSCULAR; INTRAVENOUS at 17:27

## 2019-08-30 RX ADMIN — Medication 10 ML: at 22:35

## 2019-08-30 RX ADMIN — ACETAMINOPHEN 650 MG: 325 TABLET, FILM COATED ORAL at 22:57

## 2019-08-30 RX ADMIN — Medication 600 UNITS: at 17:32

## 2019-08-30 RX ADMIN — Medication 600 UNITS: at 22:35

## 2019-08-30 RX ADMIN — Medication 10 ML: at 22:36

## 2019-08-30 RX ADMIN — HYDROCHLOROTHIAZIDE 12.5 MG: 12.5 CAPSULE ORAL at 10:59

## 2019-08-30 RX ADMIN — CLONIDINE HYDROCHLORIDE 0.1 MG: 0.1 TABLET ORAL at 12:02

## 2019-08-30 RX ADMIN — Medication 10 ML: at 06:14

## 2019-08-30 RX ADMIN — INSULIN LISPRO 3 UNITS: 100 INJECTION, SOLUTION INTRAVENOUS; SUBCUTANEOUS at 16:30

## 2019-08-30 RX ADMIN — CEFTRIAXONE SODIUM 2 G: 2 INJECTION, POWDER, FOR SOLUTION INTRAMUSCULAR; INTRAVENOUS at 17:56

## 2019-08-30 RX ADMIN — ENOXAPARIN SODIUM 40 MG: 40 INJECTION SUBCUTANEOUS at 11:00

## 2019-08-30 RX ADMIN — LISINOPRIL 40 MG: 20 TABLET ORAL at 10:59

## 2019-08-30 RX ADMIN — INSULIN LISPRO 3 UNITS: 100 INJECTION, SOLUTION INTRAVENOUS; SUBCUTANEOUS at 22:34

## 2019-08-30 NOTE — PROGRESS NOTES
Problem: Falls - Risk of  Goal: *Absence of Falls  Description  Document Adriana Girt Fall Risk and appropriate interventions in the flowsheet. Outcome: Progressing Towards Goal  Note:   Fall Risk Interventions:  Mobility Interventions: Communicate number of staff needed for ambulation/transfer         Medication Interventions: Teach patient to arise slowly    Elimination Interventions:  Toilet paper/wipes in reach

## 2019-08-30 NOTE — PROGRESS NOTES
/106. HR 67. PRN Catapres 0.1 mg given PO per MD order. Safety measures in place, call light within reach. Will continue to monitor.

## 2019-08-30 NOTE — PROGRESS NOTES
Infectious Disease Progress Note    Today's Date: 2019   Admit Date: 2019    Impression:   · L Diabetic foot infection. Plain film and MRI not suggesting osteomyelitis. He is  s/p debridement, wound culture with MSSA and S. anginosus. Anaerobic cultures negative. · Streptococcus anginosus bacteremia 2/4 bottles,secondary to #1, TTE negative. Plan:   · Vanc and Zosyn replaced with CTX 2 gm daily which covers both the staph and the strep. Plan 2 week course with EOT  19. · PICC has been placed in RUE  · Going home with home health. Will put in OPAT orders  · Follow up with ID is not required. ID will see again as needed. Anti-infectives:   1. Vancomycin -19  2. Zosyn -  3. Ceftriaxone -    Subjective:   No complaints. Family at bedside. We dicussed always wearing shoes given his DM. CM informed me he can go home with home health. Review of Systems:  A comprehensive review of systems was negative except for that written in the History of Present Illness. Objective:     Visit Vitals  BP (!) 197/106 (BP 1 Location: Left arm, BP Patient Position: At rest;Head of bed elevated (Comment degrees))   Pulse 67   Temp 97.5 °F (36.4 °C)   Resp 18   Ht 5' 9\" (1.753 m)   Wt 95.3 kg (210 lb)   SpO2 93%   BMI 31.01 kg/m²     Temp (24hrs), Av.6 °F (36.4 °C), Min:97.1 °F (36.2 °C), Max:98 °F (36.7 °C)      Lines:RUE PICC    Physical Exam:   General:  Alert, cooperative, well noursished, well developed, appears stated age   Eyes:  Sclera anicteric. Pupils equally round and reactive to light. Neck: Supple and symmetric   Lungs:    good effort, clear bilaterally   Abdomen:   Soft, non-tender.  bowel sounds normal. non-distended   Extremities: L foot dressed   Skin: Skin color, texture, turgor normal. no acute rash or lesions   Musculoskeletal: No swelling or deformity   Lines/Devices:  Intact, no erythema, drainage or tenderness   Psych: Alert and oriented, normal mood affect given the setting       Data Review:     CBC:   Recent Labs     08/29/19 0516 08/28/19  0552   WBC 9.3 8.4   RBC 3.87* 3.80*   HGB 10.9* 10.6*   HCT 32.3* 32.3*    191     CMP:   Recent Labs     08/29/19 0516 08/28/19 0552   GLU 77 99   * 137   K 3.6 3.6    103   CO2 30 28   BUN 9 10   CREA 0.83 0.88   CA 8.4 8.4   AGAP 5* 6*     Liver Enzymes: No results for input(s): TP, ALB, TBIL, AP, SGOT, GPT in the last 72 hours. No lab exists for component: DBIL     Microbiology:     All Micro Results     Procedure Component Value Units Date/Time    CULTURE, ANAEROBIC [[id number]] Collected:  08/25/19 0830    Order Status:  Completed Specimen:  Foot Updated:  08/30/19 0952     Special Requests: RIGHT        Culture result:       NO ANAEROBES ISOLATED 5 DAYS          CULTURE, WOUND Christiano Mercury STAIN [[id number]]  (Abnormal)  (Susceptibility) Collected:  08/25/19 0830    Order Status:  Completed Specimen:  Wound from Foot Updated:  08/29/19 0931     Special Requests: RIGHT        GRAM STAIN 3 TO 18 WBC'S/OIF      MANY GRAM POSITIVE COCCI        Culture result:       LIGHT STAPHYLOCOCCUS AUREUS                  MODERATE STREPTOCOCCUS ANGINOSUS          CULTURE, BLOOD [[id number]]  (Abnormal) Collected:  08/23/19 1840    Order Status:  Completed Specimen:  Blood Updated:  08/29/19 0921     Special Requests: --        LEFT  ARM       GRAM STAIN GRAM POSITIVE COCCI               AEROBIC AND ANAEROBIC BOTTLES                  RESULTS VERIFIED, PHONED TO AND READ BACK BY James Burnett RN 08/24/2019 AT 1 ELF                  AEROBIC BOTTLE POSITIVE GRAM POSITIVE COCCI RESULTS VERIFIED, PHONED TO AND READ BACK BY YANELI VIRAMONTES 0813. SBRAZEL           Culture result: ALPHA STREPTOCOCCUS         REFER TO ACC [id number] FOR ID AND SUSCEPTIBILITY    CULTURE, BLOOD [[id number]]  (Abnormal)  (Susceptibility) Collected:  08/23/19 1840    Order Status:  Completed Specimen:  Blood Updated:  08/27/19 0631     Special Requests: --        RIGHT  Antecubital       GRAM STAIN GRAM POSITIVE COCCI         ANAEROBIC BOTTLE POSITIVE               RESULTS VERIFIED, PHONED TO AND READ BACK BY Lizabeth Kerns RN 08/24/2019 AT 1428 ELF           Culture result: STREPTOCOCCUS ANGINOSUS         STREPTOCOCCUS SPECIES DETECTED Test Performed by Multiplex PCR            RESULTS VERIFIED, PHONED TO AND READ BACK BY  AIMEE NEVAREZ RN ON 08/25/19 @0614, ADS            Imaging:     Results   XR FOOT RT MIN 3 V (Accession [id number]) (Order [id number])   Allergies      No Known Allergies   Result Information     Status: Final result (Exam End: 8/23/2019 19:05) Provider Status: Open   Signed by     Signed Date/Time  Phone Pager   Mike Gupta 8/23/2019 19:09 [phone]    Exam Information     Status Exam Begun  Exam Ended    Final [99] 8/23/2019 18:54 8/23/2019 19:05   Study Result     History: Redness and swelling of the right foot. Pain     EXAM: 3 views right foot     FINDINGS: There is a question of a triangular shaped foreign body along the  plantar surface of the right foot at the level of the third proximal phalanx. There is subcutaneous emphysema, raising possibility for superimposed infection. No fracture or dislocation.     IMPRESSION  IMPRESSION: Findings suggestive of triangular radiopaque foreign body within the  plantar soft tissues at the level of the third proximal phalanx. There is also  subcutaneous emphysema seen extending from the first through fifth digits at the  level of the proximal phalanges. No Known Allergies   Wet Read      Francis Ventura MD on 8/25/2019  4:31 PM ([facility], Radiology)   Prelim: be sure to see final report.  No definite osteomyelitis.   Soft tissue swelling.       Result Information     Status: Final result (Exam End: 8/25/2019 16:15) Provider Status: Open   Signed by     Signed Date/Time  Phone Pager   Mike Gupta 8/26/2019 14:30 [phone]    Exam Information     Status Exam Begun  Exam Ended Final [99] 8/25/2019 15:21 8/25/2019 16:15   Study Result     HISTORY: Recent fifth toe amputation and foreign body removal.     EXAM: MRI right foot with and without contrast     TECHNIQUE: Multiplanar multisequence imaging is performed both before and after  the uneventful administration of 20 mL Dotarem.     COMPARISON: Plain films dated 8/23/2019     FINDINGS: The exam is compromised by patient motion.     There is a large soft tissue defect seen along the plantar surface of the right  foot. Findings compatible with the patient's given clinical history of recent  surgery. The patient is status post interval removal of the fifth digit at the  level of the MTP articulation. No evidence of osteomyelitis or abscess. There is  edema in the plantar musculature of the right foot.     IMPRESSION  IMPRESSION:  1. Large soft tissue defect with postsurgical change as described. 2. No evidence of abscess or osteomyelitis. 3. Plantar muscular edema. Findings compatible with chronic denervation change.          Signed By: Ladan White MD     August 30, 2019

## 2019-08-30 NOTE — PROGRESS NOTES
Shift assessment complete. Pt alert and oriented x4. Respirations even and unlabored. Lung sounds clear on room air. HR regular. Abdomen soft with active bowel sounds heard in all four quadrants. Surgical site noted to right foot, dressing intact. Pt denies pain in RLE. Pt does report sensation in RLE. Right PICC line intact and capped. All needs met at this time. Safety measures in place, call light within reach. Will continue to monitor.

## 2019-08-30 NOTE — PROGRESS NOTES
Problem: Self Care Deficits Care Plan (Adult)  Goal: *Acute Goals and Plan of Care (Insert Text)  Description  Patient will complete lower body dressing with supervision to increase self care independence. Patient will complete toileting transfer with supervision and NWB RLE. Patient will complete bathing with supervision to increase self care independence. PROGRESSING  Patient will complete all functional transfers with supervision and NWB RLE    Timeframe: 7 visits          OCCUPATIONAL THERAPY: Daily Note and AM 8/30/2019  INPATIENT: OT Visit Days: 2  NWB RLE  Payor: MEDICAID PENDING / Plan: [employer] PENDING / Product Type: Medicaid /      NAME/AGE/GENDER: Kaiden Blankenship is a 49 y.o. male   PRIMARY DIAGNOSIS:  Cellulitis of right leg [S78.478] Diabetic infection of right foot ([address]   Diabetic infection of right foot ([address]    Procedure(s) (LRB):  Right foot debridment, removal foreign body, right 5th toe amputation  (Right)  5 Days Post-Op  ICD-10: Treatment Diagnosis:    · Difficulty in walking, Not elsewhere classified (R26.2)   Precautions/Allergies:     Patient has no known allergies. ASSESSMENT:     Mr. Shane Brower presents supine in bed. NWB RLE. Patient is below baseline due to decreased balance and stnregth dur to R foot infection and WB restrictions. He was able to SPT from bed to recliner using a RW, but was not able to maintain NWB the entire time, at times he had his heel on the ground, he did not put midfoot or toes on the ground. Extra time explaining importance of NWB, patient with partial understanding. He also has some cognitive limitation when following verbal instructions during transfers, he does work hard. He will need continued OT for ADL's and transfers during ADL's. Could benefit from short term intensive rehab. Initiate OT POC.       8/28/19 1020am. Patient up in recliner he completed sponge bath,  Grooming and  from recliner as charted below.  He performed B chair push ups to assist with transfers. He is in good spirits and acknowledged that his in NWB on R LE at all times. He is sitting up in recliner all needs in reach. Patients finger nails very soiled educated him to clean under his nails while he is sitting watching TV. He agreed all needs in reach. He would continue to benefit from skilled OT services, will continue with established POC.    8/30/19 Patient wants to use toilet, donned ortho boot with assist. Not safe to ambulate with boot to [facility] and needs to use commode and SPT only. He is safer in boot, but still does not need to walk, due to his difficulty with NWB. Completed UE exercises in bed sitting up with red band. Continue OT. This section established at most recent assessment   PROBLEM LIST (Impairments causing functional limitations):  1. Decreased Strength  2. Decreased ADL/Functional Activities  3. Decreased Transfer Abilities  4. Decreased Balance  5. Increased Pain  6. Decreased Activity Tolerance  7. Decreased Cognition   INTERVENTIONS PLANNED: (Benefits and precautions of occupational therapy have been discussed with the patient.)  1. Activities of daily living training  2. Neuromuscular re-eduation  3. Therapeutic activity  4. Therapeutic exercise     TREATMENT PLAN: Frequency/Duration: Follow patient 3x a week to address above goals. Rehabilitation Potential For Stated Goals: Good     REHAB RECOMMENDATIONS (at time of discharge pending progress):    Placement: It is my opinion, based on this patient's performance to date, that Mr. Lloyd Molina may benefit from participating in 1-2 additional therapy sessions in order to continue to assess for rehab potential and then make recommendation for disposition at discharge. Equipment:    None at this time              OCCUPATIONAL PROFILE AND HISTORY:   History of Present Injury/Illness (Reason for Referral):  See H&P  Past Medical History/Comorbidities:   Mr. Lloyd Molina  has a past medical history of Diabetes ([facility] [address]    Marj Stevenson  has a past surgical history that includes hx cholecystectomy. Social History/Living Environment:   Home Environment: Private residence  # Steps to Enter: 5  Rails to Enter: Yes  Hand Rails : Right  One/Two Story Residence: One story  Living Alone: Yes(Room mate but alone during the day)  Support Systems: Friends \ neighbors  Patient Expects to be Discharged to[de-identified] Private residence  Current DME Used/Available at Home: None  Prior Level of Function/Work/Activity:  Lives with a roommate. Was independent all adl's and transfers. Number of Personal Factors/Comorbidities that affect the Plan of Care: Brief history (0):  LOW COMPLEXITY   ASSESSMENT OF OCCUPATIONAL PERFORMANCE[de-identified]   Activities of Daily Living:   Basic ADLs (From Assessment) Complex ADLs (From Assessment)   Feeding: Independent  Oral Facial Hygiene/Grooming: Independent  Bathing: Minimum assistance  Upper Body Dressing: Setup  Lower Body Dressing: Contact guard assistance  Toileting: Contact guard assistance     Grooming/Bathing/Dressing Activities of Daily Living                             Bed/Mat Mobility  Supine to Sit: Supervision; Additional time  Sit to Supine: (stayed up in chair)  Sit to Stand: Contact guard assistance  Stand to Sit: Contact guard assistance     Most Recent Physical Functioning:   Gross Assessment:                  Posture:     Balance:  Sitting: Intact  Standing: With support Bed Mobility:  Supine to Sit: Supervision; Additional time  Sit to Supine: (stayed up in chair)  Wheelchair Mobility:     Transfers:  Sit to Stand: Contact guard assistance  Stand to Sit: Contact guard assistance            Patient Vitals for the past 6 hrs:   BP BP Patient Position SpO2 Pulse   08/30/19 0720 (!) 214/107 At rest;Head of bed elevated (Comment degrees) 93 % 64   08/30/19 1202 (!) 197/106 -- -- 67       Mental Status  Neurologic State: Alert  Orientation Level: Oriented X4  Cognition: Follows commands  Perception: Appears intact  Perseveration: No perseveration noted  Safety/Judgement: Awareness of environment, Fall prevention                          Physical Skills Involved:  1. Range of Motion  2. Balance  3. Strength  4. Activity Tolerance  5. Gross Motor Control  6. Skin Integrity Cognitive Skills Affected (resulting in the inability to perform in a timely and safe manner):  1. Perception  2. Command followign  Psychosocial Skills Affected:  1. Environmental Adaptation  2. Self-Awareness   Number of elements that affect the Plan of Care: 3-5:  MODERATE COMPLEXITY   CLINICAL DECISION MAKIN[address] [place], [place] [id number] AM-PAC 6 Clicks   Daily Activity Inpatient Short Form  How much help from another person does the patient currently need. .. Total A Lot A Little None   1. Putting on and taking off regular lower body clothing? ? 1   ? 2   ? 3   ? 4   2. Bathing (including washing, rinsing, drying)? ? 1   ? 2   ? 3   ? 4   3. Toileting, which includes using toilet, bedpan or urinal?   ? 1   ? 2   ? 3   ? 4   4. Putting on and taking off regular upper body clothing? ? 1   ? 2   ? 3   ? 4   5. Taking care of personal grooming such as brushing teeth? ? 1   ? 2   ? 3   ? 4   6. Eating meals? ? 1   ? 2   ? 3   ? 4   © , Trustees of [address] [place] [id number], under license to [employer]. All rights reserved      Score:  Initial: 17 Most Recent: X (Date: -- )    Interpretation of Tool:  Represents activities that are increasingly more difficult (i.e. Bed mobility, Transfers, Gait). Medical Necessity:     · Skilled intervention continues to be required due to Deficits listed abvoe.   Reason for Services/Other Comments:  · Patient continues to require skilled intervention due to right foot infection and NWB   · .   Use of outcome tool(s) and clinical judgement create a POC that gives a: MODERATE COMPLEXITY         TREATMENT:   (In addition to Assessment/Re-Assessment sessions the following treatments were rendered) Pre-treatment Symptoms/Complaints:    Pain: Initial:   Pain Intensity 1: 1  Post Session:  0     Self Care: (15): Procedure(s) (per grid) utilized to improve and/or restore self-care/home management as related to dressing, bathing, grooming and hygiene. Required min visual, verbal, manual and tactile cueing to facilitate activities of daily living skills. Therapeutic Exercise: (10 minute):  Exercises per grid below to improve strength and coordination. Required minimal verbal and tactile cues to promote proper body alignment and promote proper body posture. Progressed resistance and repetitions as indicated. Date:  8/28/19 Date:  8/30/19  Red band Date:     Activity/Exercise Parameters Parameters Parameters   Chair pushups (backside off chair R LE EXTENDED OUT NWB) 15 reps     Horizontal abduction  15 reps    Chest press  15 reps    Scapular rows  15 reps                            Braces/Orthotics/Lines/Etc:   · IV Had PICC LINE  Treatment/Session Assessment:    · Response to Treatment:  good,  Motivated to participate   · Interdisciplinary Collaboration:   o Occupational Therapist  o Registered Nurse  · After treatment position/precautions:   o Supine in bed  o Bed/Chair-wheels locked  o Bed in low position  o Call light within reach  o RN notified   · Compliance with Program/Exercises: Compliant all of the time. · Recommendations/Intent for next treatment session: \"Next visit will focus on advancements to more challenging activities and reduction in assistance provided\".   Total Treatment Duration:  OT Patient Time In/Time Out  Time In: 1050  Time Out: [address], OT

## 2019-08-30 NOTE — PROGRESS NOTES
H&P/Consult Note/Progress Note/Office Note:   Shama Davis  MRN: [id number]  :1965  Age:54 y.o.    HPI: Shama Davis is a 49 y.o. male who was admitted on 19 from the ER by the Hospitalists with a diabetic right foot infection. He reported progressive, constant, severe right foot pain for at least 2 days. He walks barefoot. Nothing made pain better. It is worse with palpation or standing. He had associated low grade fever. He was placed on IV Abx by Hospitalists  He has h/o DM and non- compliance (No primary care doctor, not on DM meds)  Bld Cxs x 2 negative so far  Xray as below      19 Xray right foot, 3 views  Hx: Redness and swelling of the right foot. Pain     There is a question of a triangular shaped foreign body along the plantar surface of the right foot   at the level of the third proximal phalanx. There is subcutaneous emphysema, raising possibility for superimposed infection. No fracture or dislocation.     IMPRESSION: Findings suggestive of triangular radiopaque foreign body within the  plantar soft tissues at the level of the third proximal phalanx. There is also  subcutaneous emphysema seen extending from the first through fifth digits at the  level of the proximal phalanges    19 Arterial duplex  The right and left lower extremity segmental blood pressures are  fairly symmetric and unremarkable. KAROLINA:  Right = 1.28              Left = 1.28     RIGHT LOWER EXTREMITY:  Peak systolic velocities-- within normal limits. Normal  triphasic waveform throughout.     LEFT LOWER EXTREMITY:  Peak systolic velocities-- within normal limits. Mildly  elevated distal popliteal artery velocity.  Normal triphasic waveforms throughout.     ABDOMEN:  No evidence of aneurysm.     IMPRESSION:  No significant arterial stenosis identified.             19 POD1 right foot debridement, abscess drainage, 5th toe amputation, and FB removal  19 POD2 No sign arterial stenosis on non-invasive vasc study   8/28/19 POD3 cultures from OR with alpha Strep  8/29/19 POD4 On IV Vanc; daily dressing changes; off-loading; case management working on medicaid and SNF placement  8/30/19 POD5; ON IV ceftriaxone per ID until 9/8/19      Past Medical History:   Diagnosis Date    Diabetes [facility])      Past Surgical History:   Procedure Laterality Date    HX CHOLECYSTECTOMY       Current Facility-Administered Medications   Medication Dose Route Frequency    [START ON 8/31/2019] hydroCHLOROthiazide (MICROZIDE) capsule 25 mg  25 mg Oral DAILY    lisinopril (PRINIVIL, ZESTRIL) tablet 40 mg  40 mg Oral DAILY    cefTRIAXone (ROCEPHIN) 2 g in 0.9% sodium chloride (MBP/ADV) 50 mL  2 g IntraVENous Q24H    sodium chloride (NS) flush 10 mL  10 mL InterCATHeter Q8H    heparin (porcine) pf 600 Units  600 Units InterCATHeter Q8H    sodium chloride (NS) flush 10 mL  10 mL InterCATHeter PRN    heparin (porcine) pf 600 Units  600 Units InterCATHeter PRN    cloNIDine HCl (CATAPRES) tablet 0.1 mg  0.1 mg Oral BID PRN    insulin glargine (LANTUS) injection 10 Units  10 Units SubCUTAneous DAILY    sodium chloride (NS) flush 5-40 mL  5-40 mL IntraVENous Q8H    sodium chloride (NS) flush 5-40 mL  5-40 mL IntraVENous PRN    HYDROcodone-acetaminophen (NORCO) 7.5-325 mg per tablet 1 Tab  1 Tab Oral Q4H PRN    morphine injection 2 mg  2 mg IntraVENous Q4H PRN    naloxone (NARCAN) injection 0.4 mg  0.4 mg IntraVENous PRN    diphenhydrAMINE (BENADRYL) capsule 25 mg  25 mg Oral Q4H PRN    promethazine (PHENERGAN) tablet 25 mg  25 mg Oral Q6H PRN    magnesium hydroxide (MILK OF MAGNESIA) 400 mg/5 mL oral suspension 30 mL  30 mL Oral DAILY PRN    nicotine (NICODERM CQ) 21 mg/24 hr patch 1 Patch  1 Patch TransDERmal DAILY PRN    enoxaparin (LOVENOX) injection 40 mg  40 mg SubCUTAneous Q24H    insulin lispro (HUMALOG) injection   SubCUTAneous AC&HS    acetaminophen (TYLENOL) tablet 650 mg  650 mg Oral Q4H PRN     Patient has no known allergies. Social History     Socioeconomic History    Marital status: SINGLE     Spouse name: Not on file    Number of children: Not on file    Years of education: Not on file    Highest education level: Not on file   Tobacco Use    Smoking status: Current Every Day Smoker   Substance and Sexual Activity    Alcohol use: No    Sexual activity: Never     Social History     Tobacco Use   Smoking Status Current Every Day Smoker     History reviewed. No pertinent family history. ROS: The patient has no difficulty with chest pain or shortness of breath. No fever or chills. Comprehensive review of systems was otherwise unremarkable except as noted above. Physical Exam:   Visit Vitals  BP (!) 198/100 (BP 1 Location: Left arm, BP Patient Position: At rest) Comment: rn notified   Pulse 68   Temp 98.7 °F (37.1 °C)   Resp 18   Ht 5' 9\" (1.753 m)   Wt 210 lb (95.3 kg)   SpO2 95%   BMI 31.01 kg/m²     Vitals:    08/30/19 0720 08/30/19 1202 08/30/19 1358 08/30/19 1600   BP: (!) 214/107 (!) 197/106 (!) 183/96 (!) 198/100   Pulse: 64 67 78 68   Resp: 20 18 18 18   Temp: 97.2 °F (36.2 °C) 97.5 °F (36.4 °C) 98.7 °F (37.1 °C) 98.7 °F (37.1 °C)   SpO2: 93% 93% 97% 95%   Weight:       Height:         08/30 0701 - 08/30 1900  In: -   Out: 550 [Urine:550]  08/28 1901 - 08/30 0700  In: 2110 [P.O.:300; I.V.:1810]  Out: 3700 [Urine:3700]    Constitutional: Alert, oriented, cooperative patient in no acute distress; appears stated age    Eyes:Sclera are clear. EOMs intact  ENMT: no external lesions gross hearing normal; no obvious neck masses, no ear or lip lesions, nares normal  CV: RRR. Normal perfusion  Resp: No JVD. Breathing is  non-labored; no audible wheezing. GI: soft and non-distended     Musculoskeletal: unremarkable with normal function. No embolic signs or cyanosis. Right 5th toe amputation.  Exposed tendons on plantar right foot  Some early granulation appearing in wound  Tenuous right 2nd, 3rd, and 4th toes.  Less cellulitic change across volar forefoot  No fluctuance; No crepitus, No necrosis or debris  +edema  Hard to palpate pedal pulses    Neuro:  Oriented; moves all 4; no focal deficits  Psychiatric: normal affect and mood, no memory impairment                                Recent vitals (if inpt):  Patient Vitals for the past 24 hrs:   BP Temp Pulse Resp SpO2   08/30/19 1600 (!) 198/100 98.7 °F (37.1 °C) 68 18 95 %   08/30/19 1358 (!) 183/96 98.7 °F (37.1 °C) 78 18 97 %   08/30/19 1202 (!) 197/106 97.5 °F (36.4 °C) 67 18 93 %   08/30/19 0720 (!) 214/107 97.2 °F (36.2 °C) 64 20 93 %   08/30/19 0034 (!) 188/99 98 °F (36.7 °C) 69 18 93 %   08/29/19 1922 (!) 190/102 98 °F (36.7 °C) 73 18 93 %       Labs:  Recent Labs     08/29/19  0516   WBC 9.3   HGB 10.9*      *   K 3.6      CO2 30   BUN 9   CREA 0.83   GLU 77       Lab Results   Component Value Date/Time    WBC 9.3 08/29/2019 05:16 AM    HGB 10.9 (L) 08/29/2019 05:16 AM    PLATELET 798 12/44/6090 05:16 AM    Sodium 135 (L) 08/29/2019 05:16 AM    Potassium 3.6 08/29/2019 05:16 AM    Chloride 100 08/29/2019 05:16 AM    CO2 30 08/29/2019 05:16 AM    BUN 9 08/29/2019 05:16 AM    Creatinine 0.83 08/29/2019 05:16 AM    Glucose 77 08/29/2019 05:16 AM    Bilirubin, total 1.5 (H) 08/23/2019 05:54 PM    AST (SGOT) 10 (L) 08/23/2019 05:54 PM    ALT (SGPT) 14 08/23/2019 05:54 PM    Alk. phosphatase 129 08/23/2019 05:54 PM       CT Results  (Last 48 hours)    None        chest X-ray      I reviewed recent labs, recent radiologic studies, and pertinent records including other doctor notes if needed. I independently reviewed radiology images for studies I described above or studies I have ordered.    Admission date (for inpatients): 8/23/2019   * No surgery found *  Procedure(s):  Right foot debridment, removal foreign body, right 5th toe amputation     ASSESSMENT/PLAN:  Problem List  Date Reviewed: 8/24/2019          Codes Class Noted    * (Principal) Diabetic infection of right foot ([facility] ICD-10-CM: E11.628, L08.9  ICD-9-CM: 250.80, 686.9  8/24/2019        Sepsis ([facility] ICD-10-CM: A41.9  ICD-9-CM: 038.9, 995.91  8/24/2019        Cellulitis of right leg ICD-10-CM: L03.115  ICD-9-CM: 682.6  8/23/2019        Foreign body (FB) in soft tissue ICD-10-CM: M79.5  ICD-9-CM: 729.6  8/23/2019        Subcutaneous emphysema ([facility] ICD-10-CM: T79. 7XXA  ICD-9-CM: 958.7  8/23/2019        Uncontrolled type II diabetes mellitus ([facility] ICD-10-CM: E11.65  ICD-9-CM: 250.02  8/23/2019        Personal history of noncompliance with medical treatment ICD-10-CM: Z91.19  ICD-9-CM: V15.81  8/23/2019        Hyponatremia ICD-10-CM: E87.1  ICD-9-CM: 276.1  8/23/2019            Principal Problem:    Diabetic infection of right foot ([facility] (8/24/2019)    Active Problems:    Cellulitis of right leg (8/23/2019)      Foreign body (FB) in soft tissue (8/23/2019)      Subcutaneous emphysema ([facility] (8/23/2019)      Uncontrolled type II diabetes mellitus ([facility] (8/23/2019)      Personal history of noncompliance with medical treatment (8/23/2019)      Hyponatremia (8/23/2019)      Sepsis ([facility] (8/24/2019)         Diabetic right foot infection - limb threatening  S/p right 5th toe amputation and distal plantar forefoot debridement with extraction of glass (FB)  OR cultures --->gm + cocci with alpha Strep  Defer Abx modification to Hospitalists and ID     He may lose his right foot  I recommend SNF placement for 1-2 months  He needs to be complete non-weight bearing for right foot   and he won't be complaint with treatment (diabetic, dressings, off-loading, etc...)  if we send him home as he has no family or support  I wrote his outpt wound care order and follow-up with us in the discharge instructions section of [facility] care    PVD  No significant arterial stenosis noted on non-invasive arterial duple on 8/26/19    Uncontrolled DM  Inpt diabetic control  Sliding scale and semiquants    Non-Compliance with outpt DM regimen  Encourage compliance  Risks of non-compliance discussed including amputation and early death  He needs SNF            I have personally performed a face-to-face diagnostic evaluation and management  service on this patient. I have independently seen the patient. I have independently obtained the above history from the patient/family. I have independently examined the patient with above findings. I have independently reviewed data/labs for this patient and developed the above plan of care (MDM). Signed: Frantz Box.  Lucas Franco MD, FACS

## 2019-08-30 NOTE — PROGRESS NOTES
One time dose Hydralazine 20 mg given slow, IVP per MD order for /100. Safety measures in place, call light within reach. Will continue to monitor.

## 2019-08-30 NOTE — PROGRESS NOTES
Surgical site dressing to RLE changed per MD order. Saline moistened gauze packed into right plantar foot wound. Wound covered with dry gauze and wrapped with Kerlix per MD order. RLE remains elevated on pillows. Pt tolerated well. Safety measures in place, call light within reach. Will continue to monitor.

## 2019-08-30 NOTE — PROGRESS NOTES
[facility]  Face to Face Encounter    Patients Name: Gabino Severe    YOB: 1965    Ordering Physician: Dr. Madonna Salazar     Primary Diagnosis: Cellulitis of right leg [X77.578]    Date of Face to Face:   8/30/2019                                  Face to Face Encounter findings are related to primary reason for home care:   yes. 1. I certify that the patient needs intermittent care as follows: skilled nursing care:  skilled observation/assessment, patient education, complex care plan management, PICC care and antibiotic administration and wound care  physical therapy: stretching/ROM, transfer training and balance training    2. I certify that this patient is homebound, that is: 1) patient requires the use of a walker device, special transportation, or assistance of another to leave the home; or 2) patient's condition makes leaving the home medically contraindicated; and 3) patient has a normal inability to leave the home and leaving the home requires considerable and taxing effort. Patient may leave the home for infrequent and short duration for medical reasons, and occasional absences for non-medical reasons. Homebound status is due to the following functional limitations: Patient with strength deficits limiting the performance of all ADL's without caregiver assistance or the use of an assistive device. 3. I certify that this patient is under my care and that I, or a nurse practitioner or  [id number], or clinical nurse specialist, or certified nurse midwife, working with me, had a Face-to-Face Encounter that meets the physician Face-to-Face Encounter requirements.   The following are the clinical findings from the [address] [place] [address] encounter that support the need for skilled services and is a summary of the encounter: see hospital chart          Lida Hogan RN  8/30/2019      THE FOLLOWING TO BE COMPLETED BY THE COMMUNITY PHYSICIAN:    I concur with the findings described above from the F2F encounter that this patient is homebound and in need of a skilled service.     Certifying Physician: _____________________________________      Printed Certifying Physician Name: _____________________________________    Date: _________________

## 2019-08-30 NOTE — PROGRESS NOTES
Hospitalist Progress Note    2019  Admit Date: 2019  6:07 PM   NAME: Karlee Salmeron   :  1965   MRN:  [id number]   Attending: Reza Woods DO  PCP:  None    SUBJECTIVE:   Patient is a 49years old male with history of NDDM and non complicance presents with redness, swelling, pain of R foot and low grade fever for last 5 days. He do not recall injuries but he walks bare foot not infrequently. He has no PCP, on no medications for years despite his need for low dose insulin 2-3 y ago. Surgical debridement and removal of glass foreign body, and R 5th toe amputation .      : Jonnathan Rogers. Home infusions set up with [facility], ID orders in place. Rocephin for 2w total. Plan to work with PT on safe mobilization. BP remains high, adjusting meds. Review of Systems negative with exception of pertinent positives noted above  PHYSICAL EXAM     Visit Vitals  BP (!) 198/100 (BP 1 Location: Left arm, BP Patient Position: At rest) Comment: rn notified   Pulse 68   Temp 98.7 °F (37.1 °C)   Resp 18   Ht 5' 9\" (1.753 m)   Wt 95.3 kg (210 lb)   SpO2 95%   BMI 31.01 kg/m²      Temp (24hrs), Av °F (36.7 °C), Min:97.2 °F (36.2 °C), Max:98.7 °F (37.1 °C)    Oxygen Therapy  O2 Sat (%): 95 % (19 1600)  O2 Device: Room air (19 0700)  O2 Flow Rate (L/min): 2 l/min (19 0930)    Intake/Output Summary (Last 24 hours) at 2019 1720  Last data filed at 2019 1320  Gross per 24 hour   Intake --   Output 550 ml   Net -550 ml      General: No acute distress    Lungs:  CTA Bilaterally.    Heart:  Regular rate and rhythm,  No murmur, rub, or gallop  Abdomen: Soft, Non distended, Non tender, Positive bowel sounds  Extremities: Bandaging in place c/d/i  Neurologic:  No focal deficits    ASSESSMENT      Active Hospital Problems    Diagnosis Date Noted    [facility] 2019    Sepsis ([facility] 2019    Cellulitis of right leg 2019    Foreign body (FB) in soft tissue 08/23/2019    Subcutaneous emphysema ([facility] 08/23/2019    Uncontrolled type II diabetes mellitus ([facility] 08/23/2019    Personal history of noncompliance with medical treatment 08/23/2019    Hyponatremia 08/23/2019     Plan:    # Sepsis, R diabetic foot infection, strep anginosus bacteremia   - Surgery and ID following. Continue rocephin.  - CM following for protracted antibiotics and PT needs: unable to get SNF placement due to self pay status. - Strep anginosus bacteremia, intraop wound cx alpha strep and staph aureus, pansensitive. Rocephin EOT 9/8    # Paroxysmal atrial fibrillation  - observed during surgery and likely precipitated by sepsis  - patient spontaneously converted back to NSR  - no current indication for anticoagulation  - ordered cardiac telemetry  - sepsis management, as above    # DM type II, uncontrolled  - cannot afford any medications and does not take anything at home  - case management consult for assistance   - Lantus 10 units qdaily  - Humalog SSI and serial CBGs  - goal CBG between 140 and 180 while inpatient  - diabetes education consultation  - repeat HgbA1c of 9.7, patient would most benefit with starting on insulin regimen after hospitalization    # HTN: hctz and lisinopril, remains elevated    F/E/N: off fluids, replete electrolytes as needed, diabetic diet    Ppx: Lovenox for VTE    Code Status: FULL CODE    Disposition: abx and treatment course set. unfortunately cannot do SNF due to self pay, would be a great benefit to wound healing. HH and home abx are arranged. Will work with PT on mobility and plan to DC tomorrow. Will follow-up in free clinic for DM and HTN care.     Signed By: Kemal Rodriguez MD     August 30, 2019

## 2019-08-30 NOTE — PROGRESS NOTES
Final clx are back, ID recommending IV Rocephin 2g every day with a EOT 9/8/19. Infor sent to [facility] via [employer] to check on pricing. Once I ear back, I will have a better idea for d/c plans. @[id number] Per [facility] the cost on medication/supplies and there services are $268.94/week.  will cover for that and [facility] PT/RN/SW services. Ihsan Kidney with [facility] will be here this afternoon  For education and the contract. PT aware.

## 2019-08-30 NOTE — PROGRESS NOTES
Problem: Mobility Impaired (Adult and Pediatric)  Goal: *Acute Goals and Plan of Care (Insert Text)  Description  DISCHARGE GOALS :  (1.)Mr. Marj Stevenson will move from supine to sit and sit to supine  with MODIFIED INDEPENDENCE. (2.)Mr. Marj Stevenson will transfer from bed to chair and chair to bed with SUPERVISION using a walker NWB RIGHT LE. PLAN TO SET AMBULATION & STAIR GOALS WHEN SURGEON ALLOWS PATIENT TO USE AN OFF LOADING BOOT TO SAFELY ALLOW PT TO ATTEMPT THIS ACTIVITY SAFELY. ________________________________________________________________________________________________   Outcome: Progressing Towards Goal     PHYSICAL THERAPY: Daily Note and AM 8/30/2019  INPATIENT: PT Visit Days : 5  Payor: MEDICAID PENDING / Plan: [employer] PENDING / Product Type: Medicaid /       NAME/AGE/GENDER: Izzy Gant is a 49 y.o. male   PRIMARY DIAGNOSIS: Cellulitis of right leg [K57.982] Diabetic infection of right foot ([address]   Diabetic infection of right foot ([address]    Procedure(s) (LRB):  Right foot debridment, removal foreign body, right 5th toe amputation  (Right)  5 Days Post-Op  ICD-10: Treatment Diagnosis:    · Generalized Muscle Weakness (M62.81)  · Other lack of cordination (R27.8)   Precaution/Allergies:  Patient has no known allergies. ASSESSMENT:     Mr. Marj Stevenson still has difficulty with transitioning to standing, having a tendency to touch heel briefly when coming to stand & sitting back down. Worked on repeated sit<>stand with emphasis on NWB right LE with also repeated transfers bed<>recliner. PT resisted bilateral UE shoulder flex with triceps extension 3 sets of 5 reps, need to show pt bench press motion with t-band on follow up. PT left message on kaylah's VM to request an off loading boot for right foot. yesterday -  Patient transferred supine to sit and performed UE bench presses, overhead presses and bicep curls with red theraband.  We were getting ready to practice sit to stand but surgeon came in an wanted to undress his foot for pictures. Patient left supine and RN notified that MD wanted her to redress it. Spoke with Dr. Mg Montgomery regarding the off loading boot. He does not want ambulation. I explained that our theory was just to have the boot to allow for safer transfers to/from wheelchair since patient wants to put his foot down. He agreed to orthotics consult to see about arranging a boot. NO PRESSURE ON RIGHT FOOT PAST ARCH. ONLY HEEL AND MIDFOOT. Ama to order. Need to talk with nursing director to get the okay before ordering anything since we will get billed. On 8/30-pt supine on contact. Orthotist here at hospital with shoe for his right foot as above. Explained to pt this is not so he can walk distances but help his safety with transfers so he can put his heel down. Pt verbalizes understanding. Worked on bed mobility, sit to stand, took a few steps and then stayed up in chair with needs in reach. Will continue to follow. This section established at most recent assessment   PROBLEM LIST (Impairments causing functional limitations):  1. Decreased Strength  2. Decreased ADL/Functional Activities  3. Decreased Transfer Abilities  4. Decreased Ambulation Ability/Technique  5. Decreased Balance  6. Decreased Activity Tolerance   INTERVENTIONS PLANNED: (Benefits and precautions of physical therapy have been discussed with the patient.)  1. Balance Exercise  2. Bed Mobility  3. Therapeutic Activites  4. Therapeutic Exercise/Strengthening  5. Transfer Training     TREATMENT PLAN: Frequency/Duration: daily for duration of hospital stay  Rehabilitation Potential For Stated Goals: Good     REHAB RECOMMENDATIONS (at time of discharge pending progress):    Placement:   It is my opinion, based on this patient's performance to date, that Mr. Sherry Vance may benefit from intensive therapy at an [address] [place] [address] after discharge due to potential to make ongoing and sustainable functional improvement that is of practical value. .  Equipment:    To be determined               HISTORY:   History of Present Injury/Illness (Reason for Referral):  Patient is a 49years old male with history of NDDM and non complicance presents with redness, swelling, pain of R foot and low grade fever for last 5 days. He do not recall injuries but he walks bare foot not infrequently. He has no PCP, on no medications for years despite his need for low dose insulin 2-3 y ago. Past Medical History/Comorbidities:   Mr. Leigha Esqueda  has a past medical history of Diabetes ([address] Mr. Leigha Esqueda  has a past surgical history that includes hx cholecystectomy. Social History/Living Environment:   Home Environment: Private residence  # Steps to Enter: 5  Rails to Enter: Yes  Hand Rails : Right  One/Two Story Residence: One story  Living Alone: Yes(Room mate but alone during the day)  Support Systems: Friends \ neighbors  Patient Expects to be Discharged to[de-identified] Private residence  Current DME Used/Available at Home: None  Prior Level of Function/Work/Activity:  Pt was independent without an assistive device prior to this admission. Personal Factors: Other factors that influence how disability is experienced by the patient:  current & PMH    Number of Personal Factors/Comorbidities that affect the Plan of Care: 3+: HIGH COMPLEXITY   EXAMINATION:   Most Recent Physical Functioning:   Gross Assessment:                       Balance:  Sitting: Intact  Standing: With support Bed Mobility:  Supine to Sit: Supervision; Additional time  Sit to Supine: (stayed up in chair)       Transfers:  Sit to Stand: Contact guard assistance  Stand to Sit: Contact guard assistance  Gait:  Right Side Weight Bearing: Non-weight bearing(but now have orthotic, and can stand on heel for transfers)  Speed/Joan: Pace decreased (<100 feet/min); Slow  Distance (ft): 10 Feet (ft)  Assistive Device: Walker, rolling  Ambulation - Level of Assistance: Contact guard assistance Functional Mobility:         Transfers:  min        Bed Mobility:  sba   Body Structures Involved:  1. Metabolic  2. Muscles Body Functions Affected:  1. Movement Related  2. Metobolic/Endocrine Activities and Participation Affected:  1. General Tasks and Demands  2. Mobility   Number of elements that affect the Plan of Care: 4+: HIGH COMPLEXITY   CLINICAL PRESENTATION:   Presentation: Evolving clinical presentation with unstable and unpredictable characteristics: HIGH COMPLEXITY   CLINICAL DECISION MAKIN [facility] Mobility Inpatient Short Form  How much difficulty does the patient currently have. .. Unable A Lot A Little None   1. Turning over in bed (including adjusting bedclothes, sheets and blankets)? ? 1   ? 2   ? 3   ? 4   2. Sitting down on and standing up from a chair with arms ( e.g., wheelchair, bedside commode, etc.)   ? 1   ? 2   ? 3   ? 4   3. Moving from lying on back to sitting on the side of the bed?   ? 1   ? 2   ? 3   ? 4   How much help from another person does the patient currently need. .. Total A Lot A Little None   4. Moving to and from a bed to a chair (including a wheelchair)? ? 1   ? 2   ? 3   ? 4   5. Need to walk in hospital room? ? 1   ? 2   ? 3   ? 4   6. Climbing 3-5 steps with a railing? ? 1   ? 2   ? 3   ? 4   © , Trustees of [address] [place] [address] [address] [address], under license to [employer]. All rights reserved      Score:  Initial: 14 Most Recent: X (Date: -- )    Interpretation of Tool:  Represents activities that are increasingly more difficult (i.e. Bed mobility, Transfers, Gait). Medical Necessity:     · Patient is expected to demonstrate progress in strength, balance, coordination and functional technique  ·  to decrease assistance required with bed mobility & transfers. · .  Reason for Services/Other Comments:  · Patient continues to require skilled intervention due to pt being unsafe with functional mobility   · .    Use of outcome tool(s) and clinical judgement create a POC that gives a: Difficult prediction of patient's progress: HIGH COMPLEXITY            TREATMENT:   (In addition to Assessment/Re-Assessment sessions the following treatments were rendered)   Pre-treatment Symptoms/Complaints:  \" how am I doing ? \"  Pain: Initial: numeric scale  Pain Intensity 1: 0  Post Session: 0/10     Therapeutic Activity: (    15): Therapeutic activities including Bed transfers, Chair transfers and Ambulation on level ground to improve mobility, strength, balance and coordination. Required minimal verbal cues and manual cues   to promote static and dynamic balance in standing. Braces/Orthotics/Lines/Etc:   · IV  Treatment/Session Assessment:    · Response to Treatment:  Tolerated fairly well, did well with new orthotic shoe for his right foor  · Interdisciplinary Collaboration:   o Registered Nurse  o   o ortohotist  · After treatment position/precautions:   o Up in chair  o Bed/Chair-wheels locked  o Call light within reach  o RN notified   · Compliance with Program/Exercises: Compliant most of the time, Will assess as treatment progresses  · Recommendations/Intent for next treatment session: \"Next visit will focus on reduction in assistance provided\".   Total Treatment Duration:  PT Patient Time In/Time Out  Time In: 0915  Time Out: Beverley [id number], PT

## 2019-08-30 NOTE — PROGRESS NOTES
Shift assessment completed via doc flow sheet. Pt A & O x 4. Lungs CTA, HR WNL, NSR. Abdomen soft and non tender. Dressing to Right foot. No c/o pain at this time. PICC c/d/i, no s/sx of infection/infiltration noted. Pt able to make needs known. No concerns at this time. Bed low and locked. Call light within reach. Will continue to monitor.

## 2019-08-30 NOTE — PROGRESS NOTES
Cuauhtemoc met with Bharath from [facility] who states she will have the coordinator refax intake paperwork for SW to sign for payment agreement. CUAUHTEMOC called ID and had the answering service page the MD to write orders for home infusion.      Everette Tompkins, [address] [facility]    [address] [facility]  [email or web address]

## 2019-08-30 NOTE — PROGRESS NOTES
Expressed concerns to Dr. Madonna Salazar and , Cecelia Barakat regarding discharge planning. Per Dr. Kerline Negro, pt is strictly non-weight bearing on RLE. Pt lives alone and says he has no one to help him get to the bathroom, drive him, or help with his daily dressing changes. Pt has not ambulated during admission with PT. Pt states \"if I have to walk around my house, I will. \" Educated pt on strict orders of non-weight bearing to right side and risk of infection and potential risk of loss of extremity. Pt verbalized understanding. Neale Severance, RN spoke with Dr. Madonna Salazar. Per Dr. Madonna Salazar, it is ok to keep pt overnight and DC tomorrow. Plan for PT to work with pt tomorrow and assist with transfers and/or ambulation with walker/crutches bearing weight on LLE.

## 2019-08-31 LAB
GLUCOSE BLD STRIP.AUTO-MCNC: 113 MG/DL (ref 65–100)
GLUCOSE BLD STRIP.AUTO-MCNC: 151 MG/DL (ref 65–100)
GLUCOSE BLD STRIP.AUTO-MCNC: 167 MG/DL (ref 65–100)
GLUCOSE BLD STRIP.AUTO-MCNC: 172 MG/DL (ref 65–100)

## 2019-08-31 PROCEDURE — 74011250636 HC RX REV CODE- 250/636: Performed by: SURGERY

## 2019-08-31 PROCEDURE — 97530 THERAPEUTIC ACTIVITIES: CPT

## 2019-08-31 PROCEDURE — 97535 SELF CARE MNGMENT TRAINING: CPT

## 2019-08-31 PROCEDURE — 82962 GLUCOSE BLOOD TEST: CPT

## 2019-08-31 PROCEDURE — 74011250636 HC RX REV CODE- 250/636: Performed by: INTERNAL MEDICINE

## 2019-08-31 PROCEDURE — 74011636637 HC RX REV CODE- 636/637: Performed by: SURGERY

## 2019-08-31 PROCEDURE — 86580 TB INTRADERMAL TEST: CPT | Performed by: FAMILY MEDICINE

## 2019-08-31 PROCEDURE — 74011000302 HC RX REV CODE- 302: Performed by: FAMILY MEDICINE

## 2019-08-31 PROCEDURE — 74011000258 HC RX REV CODE- 258: Performed by: INTERNAL MEDICINE

## 2019-08-31 PROCEDURE — 74011250637 HC RX REV CODE- 250/637: Performed by: FAMILY MEDICINE

## 2019-08-31 PROCEDURE — 74011250637 HC RX REV CODE- 250/637: Performed by: SURGERY

## 2019-08-31 PROCEDURE — 65270000029 HC RM PRIVATE

## 2019-08-31 RX ADMIN — INSULIN GLARGINE 10 UNITS: 100 INJECTION, SOLUTION SUBCUTANEOUS at 09:21

## 2019-08-31 RX ADMIN — Medication 10 ML: at 17:06

## 2019-08-31 RX ADMIN — Medication 10 ML: at 22:00

## 2019-08-31 RX ADMIN — Medication 10 ML: at 05:33

## 2019-08-31 RX ADMIN — ACETAMINOPHEN 650 MG: 325 TABLET, FILM COATED ORAL at 17:22

## 2019-08-31 RX ADMIN — ACETAMINOPHEN 650 MG: 325 TABLET, FILM COATED ORAL at 09:27

## 2019-08-31 RX ADMIN — INSULIN LISPRO 2 UNITS: 100 INJECTION, SOLUTION INTRAVENOUS; SUBCUTANEOUS at 21:45

## 2019-08-31 RX ADMIN — Medication 10 ML: at 17:07

## 2019-08-31 RX ADMIN — LISINOPRIL 40 MG: 20 TABLET ORAL at 09:21

## 2019-08-31 RX ADMIN — HYDROCHLOROTHIAZIDE 25 MG: 12.5 CAPSULE ORAL at 09:21

## 2019-08-31 RX ADMIN — TUBERCULIN PURIFIED PROTEIN DERIVATIVE 5 UNITS: 5 INJECTION, SOLUTION INTRADERMAL at 17:10

## 2019-08-31 RX ADMIN — ENOXAPARIN SODIUM 40 MG: 40 INJECTION SUBCUTANEOUS at 12:15

## 2019-08-31 RX ADMIN — Medication 600 UNITS: at 17:06

## 2019-08-31 RX ADMIN — INSULIN LISPRO 3 UNITS: 100 INJECTION, SOLUTION INTRAVENOUS; SUBCUTANEOUS at 17:22

## 2019-08-31 RX ADMIN — Medication 600 UNITS: at 05:33

## 2019-08-31 RX ADMIN — CEFTRIAXONE SODIUM 2 G: 2 INJECTION, POWDER, FOR SOLUTION INTRAMUSCULAR; INTRAVENOUS at 17:05

## 2019-08-31 RX ADMIN — Medication 600 UNITS: at 21:45

## 2019-08-31 NOTE — PROGRESS NOTES
H&P/Consult Note/Progress Note/Office Note:   Hailey Kaur  MRN: [id number]  :1965  Age:54 y.o.    HPI: Hailey Kaur is a 49 y.o. male who was admitted on 19 from the ER by the Hospitalists with a diabetic right foot infection. He is s/p multiple right foot abscess drainages, removal of glass foreign body and right 5th toe amputation on 19. Prior to admission he reported progressive, constant, severe right foot pain for at least 2 days. He was walking barefoot. Nothing made pain better. It is worse with palpation or standing. He had associated low grade fever. He was placed on IV Abx by Hospitalists  He has h/o DM and non-compliance (No primary care doctor, not on DM meds)  Bld Cxs from admission grew gm + cocci; Wound cultures from [place] grew pan-sensitive Strep and Staph Aureus   Xray as below          19 Xray right foot, 3 views  Hx: Redness and swelling of the right foot. Pain     There is a question of a triangular shaped foreign body along the plantar surface of the right foot   at the level of the third proximal phalanx. There is subcutaneous emphysema, raising possibility for superimposed infection. No fracture or dislocation.     IMPRESSION: Findings suggestive of triangular radiopaque foreign body within the  plantar soft tissues at the level of the third proximal phalanx. There is also  subcutaneous emphysema seen extending from the first through fifth digits at the  level of the proximal phalanges    19 Arterial duplex  The right and left lower extremity segmental blood pressures are fairly symmetric and unremarkable. KAROLINA:  Right = 1.28              Left = 1.28     RIGHT LOWER EXTREMITY:  Peak systolic velocities-- within normal limits. Normal triphasic waveform throughout.     LEFT LOWER EXTREMITY:  Peak systolic velocities-- within normal limits. Mildly  elevated distal popliteal artery velocity.  Normal triphasic waveforms throughout.     ABDOMEN:  No evidence of aneurysm.     IMPRESSION:  No significant arterial stenosis identified.             8/26/19 POD1 right foot debridement, abscess drainage, 5th toe amputation, and FB removal  8/27/19 POD2 No sign arterial stenosis on non-invasive vasc study   8/28/19 POD3 cultures from OR with alpha Strep  8/29/19 POD4 On IV Vanc; daily dressing changes; off-loading; case management working on medicaid and SNF placement  8/30/19 POD5; ON IV ceftriaxone per ID until 9/8/19 8/31/19 POD6; IV ceftriaxone planned until 9/8/19; no weight bearing right foot        Past Medical History:   Diagnosis Date    Diabetes ([facility]      Past Surgical History:   Procedure Laterality Date    HX CHOLECYSTECTOMY       Current Facility-Administered Medications   Medication Dose Route Frequency    hydroCHLOROthiazide (MICROZIDE) capsule 25 mg  25 mg Oral DAILY    lisinopril (PRINIVIL, ZESTRIL) tablet 40 mg  40 mg Oral DAILY    cefTRIAXone (ROCEPHIN) 2 g in 0.9% sodium chloride (MBP/ADV) 50 mL  2 g IntraVENous Q24H    sodium chloride (NS) flush 10 mL  10 mL InterCATHeter Q8H    heparin (porcine) pf 600 Units  600 Units InterCATHeter Q8H    sodium chloride (NS) flush 10 mL  10 mL InterCATHeter PRN    heparin (porcine) pf 600 Units  600 Units InterCATHeter PRN    cloNIDine HCl (CATAPRES) tablet 0.1 mg  0.1 mg Oral BID PRN    insulin glargine (LANTUS) injection 10 Units  10 Units SubCUTAneous DAILY    sodium chloride (NS) flush 5-40 mL  5-40 mL IntraVENous Q8H    sodium chloride (NS) flush 5-40 mL  5-40 mL IntraVENous PRN    HYDROcodone-acetaminophen (NORCO) 7.5-325 mg per tablet 1 Tab  1 Tab Oral Q4H PRN    morphine injection 2 mg  2 mg IntraVENous Q4H PRN    naloxone (NARCAN) injection 0.4 mg  0.4 mg IntraVENous PRN    diphenhydrAMINE (BENADRYL) capsule 25 mg  25 mg Oral Q4H PRN    promethazine (PHENERGAN) tablet 25 mg  25 mg Oral Q6H PRN    magnesium hydroxide (MILK OF MAGNESIA) 400 mg/5 mL oral suspension 30 mL  30 mL Oral DAILY PRN  nicotine (NICODERM CQ) 21 mg/24 hr patch 1 Patch  1 Patch TransDERmal DAILY PRN    enoxaparin (LOVENOX) injection 40 mg  40 mg SubCUTAneous Q24H    insulin lispro (HUMALOG) injection   SubCUTAneous AC&HS    acetaminophen (TYLENOL) tablet 650 mg  650 mg Oral Q4H PRN     Patient has no known allergies. Social History     Socioeconomic History    Marital status: SINGLE     Spouse name: Not on file    Number of children: Not on file    Years of education: Not on file    Highest education level: Not on file   Tobacco Use    Smoking status: Current Every Day Smoker   Substance and Sexual Activity    Alcohol use: No    Sexual activity: Never     Social History     Tobacco Use   Smoking Status Current Every Day Smoker     History reviewed. No pertinent family history. ROS: The patient has no difficulty with chest pain or shortness of breath. No fever or chills. Comprehensive review of systems was otherwise unremarkable except as noted above. Physical Exam:   Visit Vitals  /71   Pulse 71   Temp 96.1 °F (35.6 °C)   Resp 18   Ht 5' 9\" (1.753 m)   Wt 210 lb (95.3 kg)   SpO2 91%   BMI 31.01 kg/m²     Vitals:    08/30/19 1358 08/30/19 1600 08/30/19 2035 08/30/19 2336   BP: (!) 183/96 (!) 198/100 167/81 159/71   Pulse: 78 68 79 71   Resp: 18 18 18 18   Temp: 98.7 °F (37.1 °C) 98.7 °F (37.1 °C) 98.6 °F (37 °C) 96.1 °F (35.6 °C)   SpO2: 97% 95% 91% 91%   Weight:       Height:         No intake/output data recorded. 08/29 1901 - 08/31 0700  In: -   Out: 1875 [Urine:1875]    Constitutional: Alert, oriented, cooperative patient in no acute distress; appears stated age    Eyes:Sclera are clear. EOMs intact  ENMT: no external lesions gross hearing normal; no obvious neck masses, no ear or lip lesions, nares normal  CV: RRR. Normal perfusion  Resp: No JVD. Breathing is  non-labored; no audible wheezing. GI: soft and non-distended     Musculoskeletal: unremarkable with normal function.  No embolic signs or cyanosis. Right 5th toe amputation. Exposed tendons on plantar right foot  Some early granulation appearing in wound  Tenuous right 2nd, 3rd, and 4th toes. Less cellulitic change across volar forefoot  No fluctuance; No crepitus, No necrosis or debris  +edema  Hard to palpate pedal pulses    Neuro:  Oriented; moves all 4; no focal deficits  Psychiatric: normal affect and mood, no memory impairment                                Recent vitals (if inpt):  Patient Vitals for the past 24 hrs:   BP Temp Pulse Resp SpO2   08/30/19 2336 159/71 96.1 °F (35.6 °C) 71 18 91 %   08/30/19 2035 167/81 98.6 °F (37 °C) 79 18 91 %   08/30/19 1600 (!) 198/100 98.7 °F (37.1 °C) 68 18 95 %   08/30/19 1358 (!) 183/96 98.7 °F (37.1 °C) 78 18 97 %   08/30/19 1202 (!) 197/106 97.5 °F (36.4 °C) 67 18 93 %       Labs:  Recent Labs     08/29/19  0516   WBC 9.3   HGB 10.9*      *   K 3.6      CO2 30   BUN 9   CREA 0.83   GLU 77       Lab Results   Component Value Date/Time    WBC 9.3 08/29/2019 05:16 AM    HGB 10.9 (L) 08/29/2019 05:16 AM    PLATELET 701 16/09/6982 05:16 AM    Sodium 135 (L) 08/29/2019 05:16 AM    Potassium 3.6 08/29/2019 05:16 AM    Chloride 100 08/29/2019 05:16 AM    CO2 30 08/29/2019 05:16 AM    BUN 9 08/29/2019 05:16 AM    Creatinine 0.83 08/29/2019 05:16 AM    Glucose 77 08/29/2019 05:16 AM    Bilirubin, total 1.5 (H) 08/23/2019 05:54 PM    AST (SGOT) 10 (L) 08/23/2019 05:54 PM    ALT (SGPT) 14 08/23/2019 05:54 PM    Alk. phosphatase 129 08/23/2019 05:54 PM       CT Results  (Last 48 hours)    None        chest X-ray      I reviewed recent labs, recent radiologic studies, and pertinent records including other doctor notes if needed. I independently reviewed radiology images for studies I described above or studies I have ordered.    Admission date (for inpatients): 8/23/2019   * No surgery found *  Procedure(s):  Right foot debridment, removal foreign body, right 5th toe amputation ASSESSMENT/PLAN:  Problem List  Date Reviewed: 8/24/2019          Codes Class Noted    * (Principal) Diabetic infection of right foot ([facility] ICD-10-CM: E11.628, L08.9  ICD-9-CM: 250.80, 686.9  8/24/2019        Sepsis ([facility] ICD-10-CM: A41.9  ICD-9-CM: 038.9, 995.91  8/24/2019        Cellulitis of right leg ICD-10-CM: L03.115  ICD-9-CM: 682.6  8/23/2019        Foreign body (FB) in soft tissue ICD-10-CM: M79.5  ICD-9-CM: 729.6  8/23/2019        Subcutaneous emphysema ([facility] ICD-10-CM: T79. 7XXA  ICD-9-CM: 958.7  8/23/2019        Uncontrolled type II diabetes mellitus ([facility] ICD-10-CM: E11.65  ICD-9-CM: 250.02  8/23/2019        Personal history of noncompliance with medical treatment ICD-10-CM: Z91.19  ICD-9-CM: V15.81  8/23/2019        Hyponatremia ICD-10-CM: E87.1  ICD-9-CM: 276.1  8/23/2019            Principal Problem:    Diabetic infection of right foot ([facility] (8/24/2019)    Active Problems:    Cellulitis of right leg (8/23/2019)      Foreign body (FB) in soft tissue (8/23/2019)      Subcutaneous emphysema ([facility] (8/23/2019)      Uncontrolled type II diabetes mellitus ([facility] (8/23/2019)      Personal history of noncompliance with medical treatment (8/23/2019)      Hyponatremia (8/23/2019)      Sepsis ([facility] (8/24/2019)         Diabetic right foot infection - limb threatening  S/p right 5th toe amputation and distal plantar forefoot debridement with extraction of glass (FB)  OR cultures --->gm + cocci with alpha Strep  Defer Abx modification to Hospitalists and ID     He may lose his right foot  I recommend SNF placement for 1-2 months  He needs to be complete non-weight bearing for right foot   and he won't be complaint with treatment (diabetic, dressings, off-loading, etc...)  if we send him home as he has no family or support  I wrote his outpt wound care order and follow-up with us in the discharge instructions section of [facility]    PVD  No significant arterial stenosis noted on non-invasive arterial duple on 8/26/19    Uncontrolled DM  Inpt diabetic control  Sliding scale and semiquants    Non-Compliance with outpt DM regimen  Encourage compliance  Risks of non-compliance discussed including amputation and early death  He needs SNF            I have personally performed a face-to-face diagnostic evaluation and management  service on this patient. I have independently seen the patient. I have independently obtained the above history from the patient/family. I have independently examined the patient with above findings. I have independently reviewed data/labs for this patient and developed the above plan of care (MDM). Signed: Li Mejia.  Vivek Blanco MD, FACS

## 2019-08-31 NOTE — PROGRESS NOTES
Shift assessment completed via doc flow sheet. Pt A & O x 4. Lungs CTA, HR WNL, NSR. Abdomen soft and non tender. No c/o pain at this time. PICC c/d/i, no s/sx of infection/infiltration noted. Pt able to make needs known. No concerns at this time. Bed low and locked. Call light within reach. Will continue to monitor.

## 2019-08-31 NOTE — PROGRESS NOTES
Pt resting in bed. Dsg to right foot intact. Small amount bloody drainage noted.   Pt verbalized understanding of not getting oob

## 2019-08-31 NOTE — PROGRESS NOTES
SW met with RN who has concerns regarding the patient's discharge plan. The patient is strictly non-weightbearing and his roommate recently moved out because the patient cannot drive her places in the foreseeable future. Patient states that he has no other family or friends who could help him. Malcolm Winslow from [facility] did education with the patient on 8/30 and patient repeated to her that he \"has no one. \" SW called LTAC rep Ayla Garrett to inquire about potential transfer. Ayla Garrett states that they would not be able to accept him without a payor source, and even with Medicaid only managed plans cover LTAC. SW called Slim Steward from [place], explained that the patient has submitted a Medicaid application and JOANN believes that he will likely qualify, but need additional information and documentation. Slim Steward states that if we can send her the Medicaid application Tuesday she can send it to the business office and have them look it over. It the patient is likely to be approved for Medicaid, they could offer a bed. CUAUHTEMOC shared this information with MD who agrees to keep the patient if there's a possibility he can obtain a bed at a SNF as that is the most ideal situation.      Sedrick Leonardo-Lens    [address] [facility]  [email or web address]

## 2019-08-31 NOTE — PROGRESS NOTES
Problem: Mobility Impaired (Adult and Pediatric)  Goal: *Acute Goals and Plan of Care (Insert Text)  Description  DISCHARGE GOALS RE-ASSESSED & MODIFIED 8/31/19 :  (1.)Mr. Benito Barnett will move from supine to sit and sit to supine  with MODIFIED INDEPENDENCE. (2.)Mr. Benito Barnett will transfer from bed to chair and chair to bed with SUPERVISION, using an off loading boot right & walker. 3) pt can apply boot properly     PLAN TO SET [address] [place] [address].      ________________________________________________________________________________________________   Outcome: Progressing Towards Goal     PHYSICAL THERAPY: Re-evaluation and AM 8/31/2019  INPATIENT: PT Visit Days : 1  Payor: MEDICAID PENDING / Plan: Enio Woodruff PENDING / Product Type: Medicaid /       NAME/AGE/GENDER: Mirlande Babcock is a 49 y.o. male   PRIMARY DIAGNOSIS: Cellulitis of right leg [A32.719] Diabetic infection of right foot ([address]   Diabetic infection of right foot ([address]    Procedure(s) (LRB):  Right foot debridment, removal foreign body, right 5th toe amputation  (Right)  6 Days Post-Op  ICD-10: Treatment Diagnosis:    · Generalized Muscle Weakness (M62.81)  · Other lack of cordination (R27.8)   Precaution/Allergies:  Patient has no known allergies. ASSESSMENT:     Mr. Benito Barnett has progressed well with off loading boot. CLARIFICATION : Dr Beryle Simpler does not want ambulation for pt, he has allowed pt to use an off loading boot on the right to allow pressure only on the heel  &  NO PRESSURE BEYOND THE MID ARCH OF THE FOOT DURING TRANSFERS. Pt can realistically get to a supervision level of care now with using the boot & a walker. This section established at most recent assessment   PROBLEM LIST (Impairments causing functional limitations):  1. Decreased Strength  2. Decreased ADL/Functional Activities  3. Decreased Transfer Abilities  4. Decreased Ambulation Ability/Technique  5. Decreased Balance  6.  Decreased Activity Tolerance   INTERVENTIONS PLANNED: (Benefits and precautions of physical therapy have been discussed with the patient.)  1. Balance Exercise  2. Bed Mobility  3. Therapeutic Activites  4. Therapeutic Exercise/Strengthening  5. Transfer Training     TREATMENT PLAN: Frequency/Duration: daily for duration of hospital stay  Rehabilitation Potential For Stated Goals: Good     REHAB RECOMMENDATIONS (at time of discharge pending progress):    Placement: It is my opinion, based on this patient's performance to date, that Mr. Kathy Nugent may benefit from intensive therapy at an [address] [place] [address] after discharge due to potential to make ongoing and sustainable functional improvement that is of practical value. .  Equipment:    To be determined               HISTORY:   History of Present Injury/Illness (Reason for Referral):  Patient is a 49years old male with history of NDDM and non complicance presents with redness, swelling, pain of R foot and low grade fever for last 5 days. He do not recall injuries but he walks bare foot not infrequently. He has no PCP, on no medications for years despite his need for low dose insulin 2-3 y ago. Past Medical History/Comorbidities:   Mr. Kathy Nugent  has a past medical history of Diabetes ([address] Mr. Kathy Nugent  has a past surgical history that includes hx cholecystectomy. Social History/Living Environment:   Home Environment: Private residence  # Steps to Enter: 5  Rails to Enter: Yes  Hand Rails : Right  One/Two Story Residence: One story  Living Alone: Yes(Room mate but alone during the day)  Support Systems: Friends \ neighbors  Patient Expects to be Discharged to[de-identified] Private residence  Current DME Used/Available at Home: None  Prior Level of Function/Work/Activity:  Pt was independent without an assistive device prior to this admission. Personal Factors:           Other factors that influence how disability is experienced by the patient:  current & PMH    Number of Personal Factors/Comorbidities that affect the Plan of Care: 3+: HIGH COMPLEXITY   EXAMINATION:   Most Recent Physical Functioning:   Gross Assessment:  AROM: Generally decreased, functional(left LE & both UE's)  Strength: Generally decreased, functional(left LE & both UE's)  Coordination: Generally decreased, functional(left LE & both UE's)                    Balance:  Sitting: Intact; Without support  Standing: Impaired; With support(with walker & off loading shoe right) Bed Mobility:  Supine to Sit: (NT)  Sit to Supine: (NT)  Scooting: Stand-by assistance       Transfers:  Sit to Stand: Stand-by assistance  Stand to Sit: Stand-by assistance  Bed to Chair: Stand-by assistance(with walker & off loading shoe right)  Duration: 23 Minutes(extra time to work through activity noted)  Gait:  Right Side Weight Bearing: (NWB but must transfer with off load boot for safety)      Functional Mobility:         Transfers:  sba        Bed Mobility:  NT   Body Structures Involved:  1. Metabolic  2. Muscles Body Functions Affected:  1. Movement Related  2. Metobolic/Endocrine Activities and Participation Affected:  1. General Tasks and Demands  2. Mobility   Number of elements that affect the Plan of Care: 4+: HIGH COMPLEXITY   CLINICAL PRESENTATION:   Presentation: Evolving clinical presentation with unstable and unpredictable characteristics: HIGH COMPLEXITY   CLINICAL DECISION MAKIN [facility] Mobility Inpatient Short Form  How much difficulty does the patient currently have. .. Unable A Lot A Little None   1. Turning over in bed (including adjusting bedclothes, sheets and blankets)? ? 1   ? 2   ? 3   ? 4   2. Sitting down on and standing up from a chair with arms ( e.g., wheelchair, bedside commode, etc.)   ? 1   ? 2   ? 3   ? 4   3. Moving from lying on back to sitting on the side of the bed?   ? 1   ? 2   ? 3   ? 4   How much help from another person does the patient currently need. .. Total A Lot A Little None   4. Moving to and from a bed to a chair (including a wheelchair)? ? 1   ? 2   ? 3   ? 4   5. Need to walk in hospital room? ? 1   ? 2   ? 3   ? 4   6. Climbing 3-5 steps with a railing? ? 1   ? 2   ? 3   ? 4   © 2007, Trustees of [address] [place] [address] [address] [address], under license to [employer]. All rights reserved      Score:  Initial: 14 Most Recent: X (Date: -- )    Interpretation of Tool:  Represents activities that are increasingly more difficult (i.e. Bed mobility, Transfers, Gait). Medical Necessity:     · Patient is expected to demonstrate progress in strength, balance, coordination and functional technique  ·  to decrease assistance required with bed mobility & transfers. · .  Reason for Services/Other Comments:  · Patient continues to require skilled intervention due to pt being unsafe with functional mobility   · . Use of outcome tool(s) and clinical judgement create a POC that gives a: Difficult prediction of patient's progress: HIGH COMPLEXITY            TREATMENT:   (In addition to Assessment/Re-Assessment sessions the following treatments were rendered)   Pre-treatment Symptoms/Complaints:  \" Did I do OK  ? \"  Pain: Initial: numeric scale  Pain Intensity 1: 0  Pain Location 1: Foot  Pain Orientation 1: Right  Pain Intervention(s) 1: Repositioned  Post Session: 0/10     Therapeutic Activity: (  23 Minutes(extra time to work through activity noted) ):  Therapeutic activities including Bed transfers, transfers repeatedly chair<>BSC<>chair<>bed, reviewed safety with RN & CNA to encourage pt's independence to improve mobility, strength, balance and coordination. Required minimal verbal cues and manual cues   to promote static and dynamic balance in standing. Braces/Orthotics/Lines/Etc:   · IV  · off loading boot right  Treatment/Session Assessment:    · Response to Treatment:  Pt is much better with transfers using the off load boot.   · Interdisciplinary Collaboration: o Registered Nurse  o   o Certified Nursing Assistant/Patient Care Technician  · After treatment position/precautions:   o Up in chair  o Bed/Chair-wheels locked  o Call light within reach  o RN notified   · Compliance with Program/Exercises: Will assess as treatment progresses  · Recommendations/Intent for next treatment session: \"Next visit will focus on reduction in assistance provided\".   Total Treatment Duration:  PT Patient Time In/Time Out  Time In: 1040  Time Out: [facility] 72. Real Rucker, PT

## 2019-08-31 NOTE — PROGRESS NOTES
Hospitalist Progress Note    2019  Admit Date: 2019  6:07 PM   NAME: Kelly Hamilton   :  1965   MRN:  [id number]   Attending: Nj Gary DO  PCP:  None    SUBJECTIVE:   Patient is a 49years old male with history of NDDM and non complicance presents with redness, swelling, pain of R foot and low grade fever for last 5 days. He do not recall injuries but he walks bare foot not infrequently. He has no PCP, on no medications for years despite his need for low dose insulin 2-3 y ago. Surgical debridement and removal of glass foreign body, and R 5th toe amputation .      : Yareli Singh. Change in plans with CM, may be able to qualify for STR. Await final decision. Review of Systems negative with exception of pertinent positives noted above  PHYSICAL EXAM     Visit Vitals  BP (!) 192/96 (BP 1 Location: Left arm, BP Patient Position: At rest)   Pulse 60   Temp 98.1 °F (36.7 °C)   Resp 18   Ht 5' 9\" (1.753 m)   Wt 95.3 kg (210 lb)   SpO2 95%   BMI 31.01 kg/m²      Temp (24hrs), Av °F (36.7 °C), Min:96.1 °F (35.6 °C), Max:98.7 °F (37.1 °C)    Oxygen Therapy  O2 Sat (%): 95 % (19 0800)  O2 Device: Room air (19 0700)  O2 Flow Rate (L/min): 2 l/min (19 0930)    Intake/Output Summary (Last 24 hours) at 2019 1048  Last data filed at 2019 0802  Gross per 24 hour   Intake --   Output 2400 ml   Net -2400 ml      General: No acute distress    Lungs:  CTA Bilaterally.    Heart:  Regular rate and rhythm,  No murmur, rub, or gallop  Abdomen: Soft, Non distended, Non tender, Positive bowel sounds  Extremities: Bandaging in place c/d/i  Neurologic:  No focal deficits    ASSESSMENT      Active Hospital Problems    Diagnosis Date Noted    Diabetic infection of right foot ([address] 2019    S[facility] 2019    Cellulitis of right leg 2019    Foreign body (FB) in soft tissue 2019    Subcutaneous emphysema ([facility] 2019    Uncontrolled type II diabetes mellitus [facility]) 08/23/2019    Personal history of noncompliance with medical treatment 08/23/2019    Hyponatremia 08/23/2019     Plan:    # Sepsis, R diabetic foot infection, strep anginosus bacteremia   - Surgery and ID following. Continue rocephin, EOT 9/8.  - CM following, may be able to do SNF for abx and NWB foot, working on dispo. If will not be able to, has plan in place for home infusions and HH.    - Strep anginosus bacteremia, intraop wound cx alpha strep and staph aureus, pansensitive. Rocephin EOT 9/8    # Paroxysmal atrial fibrillation  - observed during surgery and likely precipitated by sepsis  - patient spontaneously converted back to NSR  - no current indication for anticoagulation  - ordered cardiac telemetry  - sepsis management, as above    # DM type II, uncontrolled  - cannot afford any medications and does not take anything at home  - case management consult for assistance   - Lantus 10 units qdaily  - Humalog SSI and serial CBGs  - goal CBG between 140 and 180 while inpatient  - diabetes education consultation  - repeat HgbA1c of 9.7, patient would most benefit with starting on insulin regimen after hospitalization    # HTN: started and adjusting hctz and lisinopril    F/E/N: off fluids, replete electrolytes as needed, diabetic diet    Ppx: Lovenox for VTE    Code Status: FULL CODE    Disposition: await final word regarding STR from CM. [place] would be SNF for IVABx and therapy, NWB. If unable to qualify for medicaid pending status, orders are in place for [place] infusions and PT, wound care. Severely limited by lack of insurance and social support. Best real chance to salvage foot is STR.     Signed By: Ramila Hassan MD     August 31, 2019

## 2019-08-31 NOTE — PROGRESS NOTES
Problem: Self Care Deficits Care Plan (Adult)  Goal: *Acute Goals and Plan of Care (Insert Text)  Description  Patient will complete lower body dressing with supervision to increase self care independence. Patient will complete toileting transfer with supervision and NWB RLE. Patient will complete bathing with supervision to increase self care independence. PROGRESSING  Patient will complete all functional transfers with supervision and NWB RLE    Timeframe: 7 visits          OCCUPATIONAL THERAPY: Daily Note and AM 8/31/2019  INPATIENT: OT Visit Days: 4  NWB RLE  Payor: MEDICAID PENDING / Plan: Reji Dotson PENDING / Product Type: Medicaid /      NAME/AGE/GENDER: Wanda Ludwig is a 49 y.o. male   PRIMARY DIAGNOSIS:  Cellulitis of right leg [H65.912] Diabetic infection of right foot ([address]   Diabetic infection of right foot ([address]    Procedure(s) (LRB):  Right foot debridment, removal foreign body, right 5th toe amputation  (Right)  6 Days Post-Op  ICD-10: Treatment Diagnosis:    · Difficulty in walking, Not elsewhere classified (R26.2)   Precautions/Allergies:     Patient has no known allergies. ASSESSMENT:     Mr. Edei Loo presents supine in bed. NWB RLE. Patient is below baseline due to decreased balance and stnregth dur to R foot infection and WB restrictions. He was able to SPT from bed to recliner using a RW, but was not able to maintain NWB the entire time, at times he had his heel on the ground, he did not put midfoot or toes on the ground. Extra time explaining importance of NWB, patient with partial understanding. He also has some cognitive limitation when following verbal instructions during transfers, he does work hard. He will need continued OT for ADL's and transfers during ADL's. Could benefit from short term intensive rehab. Initiate OT POC.       8/28/19 1020am. Patient up in recliner he completed sponge bath,  Grooming and  from recliner as charted below.  He performed B chair push ups to assist with transfers. He is in good spirits and acknowledged that his in NWB on R LE at all times. He is sitting up in recliner all needs in reach. Patients finger nails very soiled educated him to clean under his nails while he is sitting watching TV. He agreed all needs in reach. He would continue to benefit from skilled OT services, will continue with established POC.    8/30/19 Patient wants to use toilet, donned ortho boot with assist. [facility] and needs to use commode and SPT only. He is safer in boot, but still does not need to walk, due to his difficulty with NWB. Completed UE exercises in bed sitting up with red band. Continue OT.     8/31/19 Pt was supine in bed upon arrival. Pt stated he had just called the nurse to wash him up. Pt stated, \"This is what they do I lay here and they wash me. \" Therapist exclaimed that she will help him with a ADL. Pt completed bed mobility with supervision. Pt completed sponge bath with set up while sitting on EOB. Pt stood with walker to assist with brief change. Pt required min A for bowel hygiene. Pt was able to don/doff sock and boot with min A. Pt is progressing towards goals. Continue POC. This section established at most recent assessment   PROBLEM LIST (Impairments causing functional limitations):  1. Decreased Strength  2. Decreased ADL/Functional Activities  3. Decreased Transfer Abilities  4. Decreased Balance  5. Increased Pain  6. Decreased Activity Tolerance  7. Decreased Cognition   INTERVENTIONS PLANNED: (Benefits and precautions of occupational therapy have been discussed with the patient.)  1. Activities of daily living training  2. Neuromuscular re-eduation  3. Therapeutic activity  4. Therapeutic exercise     TREATMENT PLAN: Frequency/Duration: Follow patient 3x a week to address above goals.   Rehabilitation Potential For Stated Goals: Good     REHAB RECOMMENDATIONS (at time of discharge pending progress): Placement: It is my opinion, based on this patient's performance to date, that Mr. Mariebl Chaney may benefit from participating in 1-2 additional therapy sessions in order to continue to assess for rehab potential and then make recommendation for disposition at discharge. Equipment:    None at this time              OCCUPATIONAL PROFILE AND HISTORY:   History of Present Injury/Illness (Reason for Referral):  See H&P  Past Medical History/Comorbidities:   Mr. Maribel Chaney  has a past medical history of Diabetes ([address] Mr. Maribel Chaney  has a past surgical history that includes hx cholecystectomy. Social History/Living Environment:   Home Environment: Private residence  # Steps to Enter: 5  Rails to Enter: Yes  Hand Rails : Right  One/Two Story Residence: One story  Living Alone: Yes(Room mate but alone during the day)  Support Systems: Friends \ neighbors  Patient Expects to be Discharged to[de-identified] Private residence  Current DME Used/Available at Home: None  Prior Level of Function/Work/Activity:  Lives with a roommate. Was independent all adl's and transfers.       Number of Personal Factors/Comorbidities that affect the Plan of Care: Brief history (0):  LOW COMPLEXITY   ASSESSMENT OF OCCUPATIONAL PERFORMANCE[de-identified]   Activities of Daily Living:   Basic ADLs (From Assessment) Complex ADLs (From Assessment)   Feeding: Independent  Oral Facial Hygiene/Grooming: Independent  Bathing: Minimum assistance  Upper Body Dressing: Setup  Lower Body Dressing: Contact guard assistance  Toileting: Contact guard assistance     Grooming/Bathing/Dressing Activities of Daily Living   Grooming  Washing Face: Set-up  Washing Hands: Set-up Cognitive Retraining  Safety/Judgement: Fall prevention   Upper Body Bathing  Bathing Assistance: Set-up  Position Performed: Seated edge of bed     Lower Body Bathing  Bathing Assistance: Set-up  Perineal  : Minimum assistance  Lower Body : Minimum assistance Toileting  Bladder Hygiene: Minimum assistance  Bowel Hygiene: Minimum assistance   Upper Body Dressing Assistance  Dressing Assistance: [address]: Supervision;Set-up     Lower Body Dressing Assistance  Socks: Minimim assistance  Cues: Doff;Don;Physical assistance    Boot: min A Bed/Mat Mobility  Supine to Sit: Supervision  Sit to Supine: (NT)  Sit to Stand: Minimum assistance  Stand to Sit: Stand-by assistance  Bed to Chair: Minimum assistance  Scooting: Stand-by assistance     Most Recent Physical Functioning:   Gross Assessment:                  Posture:     Balance:  Sitting: Intact  Standing: Pull to stand; With support Bed Mobility:  Supine to Sit: Supervision  Sit to Supine: (NT)  Scooting: Stand-by assistance  Wheelchair Mobility:     Transfers:  Sit to Stand: Minimum assistance  Stand to Sit: Stand-by assistance  Bed to Chair: Minimum assistance  Duration: 23 Minutes(extra time to work through activity noted)            Patient Vitals for the past 6 hrs:   BP BP Patient Position SpO2 Pulse   19 0800 (!) 192/96 At rest 95 % 60       Mental Status  Neurologic State: Alert  Orientation Level: Oriented X4  Cognition: Appropriate decision making, Appropriate for age attention/concentration  Perception: Appears intact  Perseveration: No perseveration noted  Safety/Judgement: Fall prevention            LLE Assessment  LLE Assessment (WDL): Exception to WDL RLE Assessment  RLE Assessment (WDL): Exceptions to [facility]           Physical Skills Involved:  1. Range of Motion  2. Balance  3. Strength  4. Activity Tolerance  5. Gross Motor Control  6. Skin Integrity Cognitive Skills Affected (resulting in the inability to perform in a timely and safe manner):  1. Perception  2. Command followign  Psychosocial Skills Affected:  1. Environmental Adaptation  2.  Self-Awareness   Number of elements that affect the Plan of Care: 3-5:  MODERATE COMPLEXITY   CLINICAL DECISION MAKIN [facility] [address] [address] AM-PAC 6 Clicks   Daily Activity Inpatient Short Form  How much help from another person does the patient currently need. .. Total A Lot A Little None   1. Putting on and taking off regular lower body clothing? ? 1   ? 2   ? 3   ? 4   2. Bathing (including washing, rinsing, drying)? ? 1   ? 2   ? 3   ? 4   3. Toileting, which includes using toilet, bedpan or urinal?   ? 1   ? 2   ? 3   ? 4   4. Putting on and taking off regular upper body clothing? ? 1   ? 2   ? 3   ? 4   5. Taking care of personal grooming such as brushing teeth? ? 1   ? 2   ? 3   ? 4   6. Eating meals? ? 1   ? 2   ? 3   ? 4   © 2007, Trustees of [address] [place] [address] [address] [address], under license to [employer]. All rights reserved      Score:  Initial: 17 Most Recent: X (Date: -- )    Interpretation of Tool:  Represents activities that are increasingly more difficult (i.e. Bed mobility, Transfers, Gait). Medical Necessity:     · Skilled intervention continues to be required due to Deficits listed abvoe. Reason for Services/Other Comments:  · Patient continues to require skilled intervention due to right foot infection and NWB   · .   Use of outcome tool(s) and clinical judgement create a POC that gives a: MODERATE COMPLEXITY         TREATMENT:   (In addition to Assessment/Re-Assessment sessions the following treatments were rendered)     Pre-treatment Symptoms/Complaints:    Pain: Initial:   Pain Intensity 1: 0  Post Session:  0     Self Care: (39): Procedure(s) (per grid) utilized to improve and/or restore self-care/home management as related to dressing, bathing and toileting. Required min verbal and manual cueing to facilitate activities of daily living skills.       Date:  8/28/19 Date:  8/30/19  Red band Date:     Activity/Exercise Parameters Parameters Parameters   Chair pushups (backside off chair R LE EXTENDED OUT NWB) 15 reps     Horizontal abduction  15 reps    Chest press  15 reps    Scapular rows  15 reps                            Braces/Orthotics/Lines/Etc:   · IV Had PICC LINE  Treatment/Session Assessment:    · Response to Treatment:  good,  Motivated to participate   · Interdisciplinary Collaboration:   o Certified Occupational Therapy Assistant  o Registered Nurse  · After treatment position/precautions:   o Up in chair  o Bed/Chair-wheels locked  o Bed in low position  o Call light within reach  o RN notified   · Compliance with Program/Exercises: Compliant all of the time. · Recommendations/Intent for next treatment session: \"Next visit will focus on advancements to more challenging activities and reduction in assistance provided\".   Total Treatment Duration:  OT Patient Time In/Time Out  Time In: 3092  Time Out: [address] [place] Bush

## 2019-08-31 NOTE — PROGRESS NOTES
Problem: Diabetes Self-Management  Goal: *Disease process and treatment process  Description  Define diabetes and identify own type of diabetes; list 3 options for treating diabetes. Outcome: Progressing Towards Goal     Problem: Falls - Risk of  Goal: *Absence of Falls  Description  Document Cherylle Mail Fall Risk and appropriate interventions in the flowsheet.   Outcome: Progressing Towards Goal  Note:   Fall Risk Interventions:  Mobility Interventions: Bed/chair exit alarm         Medication Interventions: Bed/chair exit alarm    Elimination Interventions: Patient to call for help with toileting needs

## 2019-08-31 NOTE — PROGRESS NOTES
Dsg to left foot changed. Old dsg removed and new dsg placed- saline moistened gauze and secured with kerlix. Pt tolerated well.

## 2019-09-01 LAB
GLUCOSE BLD STRIP.AUTO-MCNC: 133 MG/DL (ref 65–100)
GLUCOSE BLD STRIP.AUTO-MCNC: 149 MG/DL (ref 65–100)
GLUCOSE BLD STRIP.AUTO-MCNC: 182 MG/DL (ref 65–100)
GLUCOSE BLD STRIP.AUTO-MCNC: 194 MG/DL (ref 65–100)
MM INDURATION POC: 0 MM (ref 0–5)
PPD POC: NEGATIVE NEGATIVE

## 2019-09-01 PROCEDURE — 97110 THERAPEUTIC EXERCISES: CPT

## 2019-09-01 PROCEDURE — 74011636637 HC RX REV CODE- 636/637: Performed by: SURGERY

## 2019-09-01 PROCEDURE — 74011250636 HC RX REV CODE- 250/636: Performed by: INTERNAL MEDICINE

## 2019-09-01 PROCEDURE — 97535 SELF CARE MNGMENT TRAINING: CPT

## 2019-09-01 PROCEDURE — 74011250637 HC RX REV CODE- 250/637: Performed by: FAMILY MEDICINE

## 2019-09-01 PROCEDURE — 74011250637 HC RX REV CODE- 250/637: Performed by: SURGERY

## 2019-09-01 PROCEDURE — 74011250637 HC RX REV CODE- 250/637: Performed by: HOSPITALIST

## 2019-09-01 PROCEDURE — 97530 THERAPEUTIC ACTIVITIES: CPT

## 2019-09-01 PROCEDURE — 74011000258 HC RX REV CODE- 258: Performed by: INTERNAL MEDICINE

## 2019-09-01 PROCEDURE — 82962 GLUCOSE BLOOD TEST: CPT

## 2019-09-01 PROCEDURE — 74011250636 HC RX REV CODE- 250/636: Performed by: SURGERY

## 2019-09-01 PROCEDURE — 65270000029 HC RM PRIVATE

## 2019-09-01 RX ORDER — AMLODIPINE BESYLATE 5 MG/1
5 TABLET ORAL DAILY
Status: DISCONTINUED | OUTPATIENT
Start: 2019-09-01 | End: 2019-09-04 | Stop reason: HOSPADM

## 2019-09-01 RX ADMIN — ACETAMINOPHEN 650 MG: 325 TABLET, FILM COATED ORAL at 00:30

## 2019-09-01 RX ADMIN — Medication 600 UNITS: at 22:07

## 2019-09-01 RX ADMIN — INSULIN GLARGINE 10 UNITS: 100 INJECTION, SOLUTION SUBCUTANEOUS at 08:08

## 2019-09-01 RX ADMIN — LISINOPRIL 40 MG: 20 TABLET ORAL at 08:07

## 2019-09-01 RX ADMIN — Medication 10 ML: at 14:35

## 2019-09-01 RX ADMIN — CEFTRIAXONE SODIUM 2 G: 2 INJECTION, POWDER, FOR SOLUTION INTRAMUSCULAR; INTRAVENOUS at 15:21

## 2019-09-01 RX ADMIN — Medication 10 ML: at 14:36

## 2019-09-01 RX ADMIN — Medication 600 UNITS: at 14:51

## 2019-09-01 RX ADMIN — CLONIDINE HYDROCHLORIDE 0.1 MG: 0.1 TABLET ORAL at 00:23

## 2019-09-01 RX ADMIN — Medication 10 ML: at 22:00

## 2019-09-01 RX ADMIN — INSULIN LISPRO 3 UNITS: 100 INJECTION, SOLUTION INTRAVENOUS; SUBCUTANEOUS at 22:08

## 2019-09-01 RX ADMIN — Medication 10 ML: at 06:00

## 2019-09-01 RX ADMIN — CLONIDINE HYDROCHLORIDE 0.1 MG: 0.1 TABLET ORAL at 04:45

## 2019-09-01 RX ADMIN — ENOXAPARIN SODIUM 40 MG: 40 INJECTION SUBCUTANEOUS at 07:47

## 2019-09-01 RX ADMIN — SPIRONOLACTONE: 25 TABLET ORAL at 09:00

## 2019-09-01 RX ADMIN — INSULIN LISPRO 2 UNITS: 100 INJECTION, SOLUTION INTRAVENOUS; SUBCUTANEOUS at 16:36

## 2019-09-01 RX ADMIN — Medication 600 UNITS: at 05:46

## 2019-09-01 RX ADMIN — AMLODIPINE BESYLATE 5 MG: 5 TABLET ORAL at 08:08

## 2019-09-01 NOTE — PROGRESS NOTES
Report received from off going RN. Shift assessment complete. Patient A&O x 4. Respirations present, even and unlabored. Breath sounds clear. Pt on RA. Bowel sounds active in all 4 quadrants. Abdomen. ... soft and non tender. Pain. ....controlled  Position. .... resting in bed  Potty. .. Ayaan Bush Ayaan Bush uses the urinal and wears briefs  Bed low and locked. Call light within reach.

## 2019-09-01 NOTE — PROGRESS NOTES
Problem: Diabetes Self-Management  Goal: *Disease process and treatment process  Description  Define diabetes and identify own type of diabetes; list 3 options for treating diabetes.   Outcome: Progressing Towards Goal

## 2019-09-01 NOTE — PROGRESS NOTES
Problem: Self Care Deficits Care Plan (Adult)  Goal: *Acute Goals and Plan of Care (Insert Text)  Description  Patient will complete lower body dressing with supervision to increase self care independence. Patient will complete toileting transfer with supervision and NWB RLE. Patient will complete bathing with supervision to increase self care independence. PROGRESSING  Patient will complete all functional transfers with supervision and NWB RLE    Timeframe: 7 visits          OCCUPATIONAL THERAPY: Daily Note and AM 9/1/2019  INPATIENT: OT Visit Days: 5  NWB RLE  Payor: MEDICAID PENDING / Plan: [employer] PENDING / Product Type: Medicaid /      NAME/AGE/GENDER: Flakita Wright is a 49 y.o. male   PRIMARY DIAGNOSIS:  Cellulitis of right leg [C45.716] Diabetic infection of right foot ([address]   Diabetic infection of right foot ([address]    Procedure(s) (LRB):  Right foot debridment, removal foreign body, right 5th toe amputation  (Right)  7 Days Post-Op  ICD-10: Treatment Diagnosis:    · Difficulty in walking, Not elsewhere classified (R26.2)   Precautions/Allergies:     Patient has no known allergies. ASSESSMENT:     Mr. Kiara Morrison presents supine in bed. NWB RLE. Patient is below baseline due to decreased balance and stnregth dur to R foot infection and WB restrictions. He was able to SPT from bed to recliner using a RW, but was not able to maintain NWB the entire time, at times he had his heel on the ground, he did not put midfoot or toes on the ground. Extra time explaining importance of NWB, patient with partial understanding. He also has some cognitive limitation when following verbal instructions during transfers, he does work hard. He will need continued OT for ADL's and transfers during ADL's. Could benefit from short term intensive rehab. Initiate OT POC.       8/28/19 1020am. Patient up in recliner he completed sponge bath,  Grooming and  from recliner as charted below.  He performed B chair push ups to assist with transfers. He is in good spirits and acknowledged that his in NWB on R LE at all times. He is sitting up in recliner all needs in reach. Patients finger nails very soiled educated him to clean under his nails while he is sitting watching TV. He agreed all needs in reach. He would continue to benefit from skilled OT services, will continue with established POC.    8/30/19 Patient wants to use toilet, donned ortho boot with assist. [facility] and needs to use commode and SPT only. He is safer in boot, but still does not need to walk, due to his difficulty with NWB. Completed UE exercises in bed sitting up with red band. Continue OT.     8/31/19 Pt was supine in bed upon arrival. Pt stated he had just called the nurse to wash him up. Pt stated, \"This is what they do I lay here and they wash me. \" Therapist exclaimed that she will help him with a ADL. Pt completed bed mobility with supervision. Pt completed sponge bath with set up while sitting on EOB. Pt stood with walker to assist with brief change. Pt required min A for bowel hygiene. Pt was able to don/doff sock and boot with min A. Pt is progressing towards goals. Continue POC.     9/1/19 Improving ability to don orthosis and safe SPT to chair as well as safe dressing. In recliner able to complete UE exercises using red band. Progressing. This section established at most recent assessment   PROBLEM LIST (Impairments causing functional limitations):  1. Decreased Strength  2. Decreased ADL/Functional Activities  3. Decreased Transfer Abilities  4. Decreased Balance  5. Increased Pain  6. Decreased Activity Tolerance  7. Decreased Cognition   INTERVENTIONS PLANNED: (Benefits and precautions of occupational therapy have been discussed with the patient.)  1. Activities of daily living training  2. Neuromuscular re-eduation  3. Therapeutic activity  4.  Therapeutic exercise     TREATMENT PLAN: Frequency/Duration: Follow patient 3x a week to address above goals. Rehabilitation Potential For Stated Goals: Good     REHAB RECOMMENDATIONS (at time of discharge pending progress):    Placement: It is my opinion, based on this patient's performance to date, that Mr. Maribel Chaney may benefit from participating in 1-2 additional therapy sessions in order to continue to assess for rehab potential and then make recommendation for disposition at discharge. Equipment:    None at this time              OCCUPATIONAL PROFILE AND HISTORY:   History of Present Injury/Illness (Reason for Referral):  See H&P  Past Medical History/Comorbidities:   Mr. Maribel Chaney  has a past medical history of Diabetes ([facility] [address] Mr. Maribel Chaney  has a past surgical history that includes hx cholecystectomy. Social History/Living Environment:   Home Environment: Private residence  # Steps to Enter: 5  Rails to Enter: Yes  Hand Rails : Right  One/Two Story Residence: One story  Living Alone: Yes(Room mate but alone during the day)  Support Systems: Friends \ neighbors  Patient Expects to be Discharged to[de-identified] Private residence  Current DME Used/Available at Home: None  Prior Level of Function/Work/Activity:  Lives with a roommate. Was independent all adl's and transfers.       Number of Personal Factors/Comorbidities that affect the Plan of Care: Brief history (0):  LOW COMPLEXITY   ASSESSMENT OF OCCUPATIONAL PERFORMANCE[de-identified]   Activities of Daily Living:   Basic ADLs (From Assessment) Complex ADLs (From Assessment)   Feeding: Independent  Oral Facial Hygiene/Grooming: Independent  Bathing: Minimum assistance  Upper Body Dressing: Setup  Lower Body Dressing: Stand-by assistance  Toileting: Contact guard assistance     Grooming/Bathing/Dressing Activities of Daily Living                           Lower Body Dressing Assistance  Socks: Minimim assistance  Cues: Doff;Don;Physical assistance    Boot: min A Bed/Mat Mobility  Supine to Sit: Supervision  Sit to Stand: Contact guard assistance  Bed to Chair: Contact guard assistance     Most Recent Physical Functioning:   Gross Assessment:                  Posture:     Balance:    Bed Mobility:  Supine to Sit: Supervision  Wheelchair Mobility:     Transfers:  Sit to Stand: Contact guard assistance  Bed to Chair: Contact guard assistance            Patient Vitals for the past 6 hrs:   BP BP Patient Position SpO2 Pulse   09/01/19 0419 (!) 209/100 At rest -- --   09/01/19 0619 198/90 -- -- --   09/01/19 0801 (!) 200/93 At rest 93 % 61       Mental Status  Neurologic State: Alert  Orientation Level: Oriented X4  Cognition: Follows commands  Perception: Appears intact  Perseveration: No perseveration noted  Safety/Judgement: Fall prevention                          Physical Skills Involved:  1. Range of Motion  2. Balance  3. Strength  4. Activity Tolerance  5. Gross Motor Control  6. Skin Integrity Cognitive Skills Affected (resulting in the inability to perform in a timely and safe manner):  1. Perception  2. Command followign  Psychosocial Skills Affected:  1. Environmental Adaptation  2. Self-Awareness   Number of elements that affect the Plan of Care: 3-5:  MODERATE COMPLEXITY   CLINICAL DECISION MAKING:   [facility] AM-PAC 6 Clicks   Daily Activity Inpatient Short Form  How much help from another person does the patient currently need. .. Total A Lot A Little None   1. Putting on and taking off regular lower body clothing? ? 1   ? 2   ? 3   ? 4   2. Bathing (including washing, rinsing, drying)? ? 1   ? 2   ? 3   ? 4   3. Toileting, which includes using toilet, bedpan or urinal?   ? 1   ? 2   ? 3   ? 4   4. Putting on and taking off regular upper body clothing? ? 1   ? 2   ? 3   ? 4   5. Taking care of personal grooming such as brushing teeth? ? 1   ? 2   ? 3   ? 4   6. Eating meals? ? 1   ? 2   ? 3   ? 4   © 2007, Trustees of [facility], under license to [employer].  All rights reserved      Score:  Initial: 17 Most Recent: X (Date: -- )    Interpretation of Tool:  Represents activities that are increasingly more difficult (i.e. Bed mobility, Transfers, Gait). Medical Necessity:     · Skilled intervention continues to be required due to Deficits listed abvoe. Reason for Services/Other Comments:  · Patient continues to require skilled intervention due to right foot infection and NWB   · .   Use of outcome tool(s) and clinical judgement create a POC that gives a: MODERATE COMPLEXITY         TREATMENT:   (In addition to Assessment/Re-Assessment sessions the following treatments were rendered)     Pre-treatment Symptoms/Complaints:    Pain: Initial:   Pain Intensity 1: 3  Post Session:  0     Self Care: (13): Procedure(s) (per grid) utilized to improve and/or restore self-care/home management as related to dressing, bathing and toileting. Required min verbal and manual cueing to facilitate activities of daily living skills. Therapeutic Exercise: (10 mintues):  Exercises per grid below to improve mobility and strength. Required minimal visual, verbal and manual cues to promote proper body posture and promote proper body mechanics. Progressed resistance, range and repetitions as indicated.         Date:  8/28/19 Date:  8/30/19  Red band Date:  9/1/19  Red band   Activity/Exercise Parameters Parameters Parameters   Chair pushups (backside off chair R LE EXTENDED OUT NWB) 15 reps     Horizontal abduction  15 reps 2 sets of 15   Chest press  15 reps 2 sets of 15   Scapular rows  15 reps 2 sets of 15                           Braces/Orthotics/Lines/Etc:   · IV Had PICC LINE  Treatment/Session Assessment:    · Response to Treatment:  good,  Motivated to participate   · Interdisciplinary Collaboration:   o Certified Occupational Therapy Assistant  o Registered Nurse  · After treatment position/precautions:   o Up in chair  o Bed/Chair-wheels locked  o Bed in low position  o Call light within reach  o RN notified   · Compliance with Program/Exercises: Compliant all of the time. · Recommendations/Intent for next treatment session: \"Next visit will focus on advancements to more challenging activities and reduction in assistance provided\".   Total Treatment Duration:  OT Patient Time In/Time Out  Time In: 0900  Time Out: Carla Mackey OT

## 2019-09-01 NOTE — PROGRESS NOTES
Hospitalist Progress Note    2019  Admit Date: 2019  6:07 PM   NAME: Mirlande Babcock   :  1965   MRN:  [id number]   Attending: Jesus Manuel Jauregui DO  PCP:  None    SUBJECTIVE:   Patient is a 49years old male with history of NDDM and non complicance presents with redness, swelling, pain of R foot and low grade fever for last 5 days. He do not recall injuries but he walks bare foot not infrequently. He has no PCP, on no medications for years despite his need for low dose insulin 2-3 y ago. Surgical debridement and removal of glass foreign body, and R 5th toe amputation . : Vincent Schwartz. Await STR approval. HTN still hard to control. Review of Systems negative with exception of pertinent positives noted above  PHYSICAL EXAM     Visit Vitals  /72 (BP 1 Location: Left arm, BP Patient Position: Sitting)   Pulse 67   Temp 98.4 °F (36.9 °C)   Resp 18   Ht 5' 9\" (1.753 m)   Wt 95.3 kg (210 lb)   SpO2 90%   BMI 31.01 kg/m²      Temp (24hrs), Av.3 °F (36.8 °C), Min:97.8 °F (36.6 °C), Max:98.6 °F (37 °C)    Oxygen Therapy  O2 Sat (%): 90 % (19 1130)  O2 Device: Room air (19 0700)  O2 Flow Rate (L/min): 2 l/min (19 0930)    Intake/Output Summary (Last 24 hours) at 2019 1436  Last data filed at 2019 1424  Gross per 24 hour   Intake 250 ml   Output 3575 ml   Net -3325 ml      General: No acute distress    Lungs:  CTA Bilaterally.    Heart:  Regular rate and rhythm,  No murmur, rub, or gallop  Abdomen: Soft, Non distended, Non tender, Positive bowel sounds  Extremities: Bandaging in place c/d/i  Neurologic:  No focal deficits    ASSESSMENT      Active Hospital Problems    Diagnosis Date Noted    Diabetic infection of right foot ([address] 2019    Sepsis ([facility] [address] 2019    Cellulitis of right leg 2019    Foreign body (FB) in soft tissue 2019    Subcutaneous emphysema ([facility] [address] 2019    Un[facility] diabetes mellitus ([facility] 2019    Personal history of noncompliance with medical treatment 08/23/2019    Hyponatremia 08/23/2019     Plan:    # Sepsis, R diabetic foot infection, strep anginosus bacteremia   - Surgery and ID following. Continue rocephin, EOT 9/8.  - CM following, may be able to do SNF for abx and NWB foot, working on dispo. If will not be able to, has plan in place for home infusions and HH.    - Strep anginosus bacteremia, intraop wound cx alpha strep and staph aureus, pansensitive. Rocephin EOT 9/8    # Paroxysmal atrial fibrillation  - observed during surgery and likely precipitated by sepsis  - patient spontaneously converted back to NSR  - no current indication for anticoagulation  - ordered cardiac telemetry  - sepsis management, as above    # DM type II, uncontrolled  - cannot afford any medications and does not take anything at home  - case management consult for assistance   - Lantus 10 units qdaily  - Humalog SSI and serial CBGs  - goal CBG between 140 and 180 while inpatient  - diabetes education consultation  - repeat HgbA1c of 9.7, patient would most benefit with starting on insulin regimen after hospitalization    # HTN: started and adjusting norvasc, aldactazide, lisinopril    F/E/N: off fluids, replete electrolytes as needed, diabetic diet    Ppx: Lovenox for VTE    Code Status: FULL CODE    Disposition: await final word regarding STR from CM. [facility] would be SNF for IVABx and therapy, NWB. If unable to qualify for medicaid pending status, orders are in place for [facility] infusions and PT, wound care. Severely limited by lack of insurance and social support. Best real chance to salvage foot is STR.     Signed By: Sanjiv Rivera MD     September 1, 2019

## 2019-09-01 NOTE — PROGRESS NOTES
H&P/Consult Note/Progress Note/Office Note:   Adrienne Ramesh  MRN: [id number]  :1965  Age:54 y.o.    HPI: Adrienne Ramesh is a 49 y.o. male who was admitted on 19 from the ER by the Hospitalists with a diabetic right foot infection. He is s/p multiple right foot abscess drainages, removal of glass foreign body and right 5th toe amputation on 19. Prior to admission he reported progressive, constant, severe right foot pain for at least 2 days. He was walking barefoot. Nothing made pain better. It is worse with palpation or standing. He had associated low grade fever. He was placed on IV Abx by Hospitalists  He has h/o DM and non-compliance (No primary care doctor, not on DM meds)  Bld Cxs from admission grew gm + cocci; Wound cultures from [place] grew pan-sensitive Strep and Staph Aureus   Xray as below          19 Xray right foot, 3 views  Hx: Redness and swelling of the right foot. Pain     There is a question of a triangular shaped foreign body along the plantar surface of the right foot   at the level of the third proximal phalanx. There is subcutaneous emphysema, raising possibility for superimposed infection. No fracture or dislocation.     IMPRESSION: Findings suggestive of triangular radiopaque foreign body within the  plantar soft tissues at the level of the third proximal phalanx. There is also  subcutaneous emphysema seen extending from the first through fifth digits at the  level of the proximal phalanges    19 Arterial duplex  The right and left lower extremity segmental blood pressures are fairly symmetric and unremarkable. KAROLINA:  Right = 1.28              Left = 1.28     RIGHT LOWER EXTREMITY:  Peak systolic velocities-- within normal limits. Normal triphasic waveform throughout.     LEFT LOWER EXTREMITY:  Peak systolic velocities-- within normal limits. Mildly  elevated distal popliteal artery velocity.  Normal triphasic waveforms throughout.     ABDOMEN:  No evidence of aneurysm.     IMPRESSION:  No significant arterial stenosis identified.             8/26/19 POD1 right foot debridement, abscess drainage, 5th toe amputation, and FB removal  8/27/19 POD2 No sign arterial stenosis on non-invasive vasc study   8/28/19 POD3 cultures from OR with alpha Strep  8/29/19 POD4 On IV Vanc; daily dressing changes; off-loading; case management working on medicaid and SNF placement  8/30/19 POD5; ON IV ceftriaxone per ID until 9/8/19 8/31/19 POD6; IV ceftriaxone planned until 9/8/19; no weight bearing right foot        Past Medical History:   Diagnosis Date    Diabetes ([facility]      Past Surgical History:   Procedure Laterality Date    HX CHOLECYSTECTOMY       Current Facility-Administered Medications   Medication Dose Route Frequency    spironolactone/hydroCHLOROthiazide (ALDACTAZIDE) 25/25 mg   Oral DAILY    amLODIPine (NORVASC) tablet 5 mg  5 mg Oral DAILY    tuberculin injection 5 Units  5 Units IntraDERMal ONCE    lisinopril (PRINIVIL, ZESTRIL) tablet 40 mg  40 mg Oral DAILY    cefTRIAXone (ROCEPHIN) 2 g in 0.9% sodium chloride (MBP/ADV) 50 mL  2 g IntraVENous Q24H    sodium chloride (NS) flush 10 mL  10 mL InterCATHeter Q8H    heparin (porcine) pf 600 Units  600 Units InterCATHeter Q8H    sodium chloride (NS) flush 10 mL  10 mL InterCATHeter PRN    heparin (porcine) pf 600 Units  600 Units InterCATHeter PRN    cloNIDine HCl (CATAPRES) tablet 0.1 mg  0.1 mg Oral BID PRN    insulin glargine (LANTUS) injection 10 Units  10 Units SubCUTAneous DAILY    sodium chloride (NS) flush 5-40 mL  5-40 mL IntraVENous Q8H    sodium chloride (NS) flush 5-40 mL  5-40 mL IntraVENous PRN    HYDROcodone-acetaminophen (NORCO) 7.5-325 mg per tablet 1 Tab  1 Tab Oral Q4H PRN    morphine injection 2 mg  2 mg IntraVENous Q4H PRN    naloxone (NARCAN) injection 0.4 mg  0.4 mg IntraVENous PRN    diphenhydrAMINE (BENADRYL) capsule 25 mg  25 mg Oral Q4H PRN    promethazine (PHENERGAN) tablet 25 mg 25 mg Oral Q6H PRN    magnesium hydroxide (MILK OF MAGNESIA) 400 mg/5 mL oral suspension 30 mL  30 mL Oral DAILY PRN    nicotine (NICODERM CQ) 21 mg/24 hr patch 1 Patch  1 Patch TransDERmal DAILY PRN    enoxaparin (LOVENOX) injection 40 mg  40 mg SubCUTAneous Q24H    insulin lispro (HUMALOG) injection   SubCUTAneous AC&HS    acetaminophen (TYLENOL) tablet 650 mg  650 mg Oral Q4H PRN     Patient has no known allergies. Social History     Socioeconomic History    Marital status: SINGLE     Spouse name: Not on file    Number of children: Not on file    Years of education: Not on file    Highest education level: Not on file   Tobacco Use    Smoking status: Current Every Day Smoker   Substance and Sexual Activity    Alcohol use: No    Sexual activity: Never     Social History     Tobacco Use   Smoking Status Current Every Day Smoker     History reviewed. No pertinent family history. ROS: The patient has no difficulty with chest pain or shortness of breath. No fever or chills. Comprehensive review of systems was otherwise unremarkable except as noted above. Physical Exam:   Visit Vitals  BP (!) 200/93 (BP 1 Location: Left arm, BP Patient Position: At rest)   Pulse 61   Temp 98.5 °F (36.9 °C)   Resp 18   Ht 5' 9\" (1.753 m)   Wt 210 lb (95.3 kg)   SpO2 93%   BMI 31.01 kg/m²     Vitals:    09/01/19 0322 09/01/19 0419 09/01/19 0619 09/01/19 0801   BP: (!) 196/91 (!) 209/100 198/90 (!) 200/93   Pulse: 60   61   Resp: 18   18   Temp: 97.8 °F (36.6 °C)   98.5 °F (36.9 °C)   SpO2: 95%   93%   Weight:       Height:         09/01 0701 - 09/01 1900  In: 250 [P.O.:250]  Out: 650 [Urine:650]  08/30 1901 - 09/01 0700  In: -   Out: 5868 [Urine:3925]    Constitutional: Alert, oriented, cooperative patient in no acute distress; appears stated age    Eyes:Sclera are clear. EOMs intact  ENMT: no external lesions gross hearing normal; no obvious neck masses, no ear or lip lesions, nares normal  CV: RRR.  Normal perfusion  Resp: No JVD. Breathing is  non-labored; no audible wheezing. GI: soft and non-distended     Musculoskeletal: unremarkable with normal function. No embolic signs or cyanosis. Right 5th toe amputation. Exposed tendons on plantar right foot  Some early granulation appearing in wound  Tenuous right 2nd, 3rd, and 4th toes. Less cellulitic change across volar forefoot  No fluctuance; No crepitus, No necrosis or debris  +edema  Hard to palpate pedal pulses    Neuro:  Oriented; moves all 4; no focal deficits  Psychiatric: normal affect and mood, no memory impairment                                Recent vitals (if inpt):  Patient Vitals for the past 24 hrs:   BP Temp Pulse Resp SpO2   09/01/19 0801 (!) 200/93 98.5 °F (36.9 °C) 61 18 93 %   09/01/19 0619 198/90 -- -- -- --   09/01/19 0419 (!) 209/100 -- -- -- --   09/01/19 0322 (!) 196/91 97.8 °F (36.6 °C) 60 18 95 %   08/31/19 2342 (!) 185/98 98 °F (36.7 °C) 67 18 94 %   08/31/19 1917 169/86 98.6 °F (37 °C) 64 18 94 %   08/31/19 1628 170/90 98.4 °F (36.9 °C) 67 18 92 %   08/31/19 1204 142/87 98.5 °F (36.9 °C) 62 18 96 %       Labs:  No results for input(s): WBC, HGB, PLT, NA, K, CL, CO2, BUN, CREA, GLU, PTP, INR, APTT, TBIL, TBILI, CBIL, SGOT, GPT, ALT, AP, AML, AML, LPSE, LCAD, NH4, TROPT, TROIQ, PCO2, PO2, HCO3, HGBEXT, PLTEXT, HGBEXT, PLTEXT in the last 72 hours. No lab exists for component:  PH, INREXT, INREXT    Lab Results   Component Value Date/Time    WBC 9.3 08/29/2019 05:16 AM    HGB 10.9 (L) 08/29/2019 05:16 AM    PLATELET 556 75/87/5871 05:16 AM    Sodium 135 (L) 08/29/2019 05:16 AM    Potassium 3.6 08/29/2019 05:16 AM    Chloride 100 08/29/2019 05:16 AM    CO2 30 08/29/2019 05:16 AM    BUN 9 08/29/2019 05:16 AM    Creatinine 0.83 08/29/2019 05:16 AM    Glucose 77 08/29/2019 05:16 AM    Bilirubin, total 1.5 (H) 08/23/2019 05:54 PM    AST (SGOT) 10 (L) 08/23/2019 05:54 PM    ALT (SGPT) 14 08/23/2019 05:54 PM    Alk.  phosphatase 129 08/23/2019 05:54 PM       CT Results  (Last 48 hours)    None        chest X-ray      I reviewed recent labs, recent radiologic studies, and pertinent records including other doctor notes if needed. I independently reviewed radiology images for studies I described above or studies I have ordered. Admission date (for inpatients): 8/23/2019   * No surgery found *  Procedure(s):  Right foot debridment, removal foreign body, right 5th toe amputation     ASSESSMENT/PLAN:  Problem List  Date Reviewed: 8/24/2019          Codes Class Noted    * (Principal) Diabetic infection of right foot ([facility] ICD-10-CM: E11.628, L08.9  ICD-9-CM: 250.80, 686.9  8/24/2019        Sepsis ([facility] ICD-10-CM: A41.9  ICD-9-CM: 038.9, 995.91  8/24/2019        Cellulitis of right leg ICD-10-CM: L03.115  ICD-9-CM: 682.6  8/23/2019        Foreign body (FB) in soft tissue ICD-10-CM: M79.5  ICD-9-CM: 729.6  8/23/2019        Subcutaneous emphysema ([facility] ICD-10-CM: T79. 7XXA  ICD-9-CM: 958.7  8/23/2019        Uncontrolled type II diabetes mellitus ([facility] ICD-10-CM: E11.65  ICD-9-CM: 250.02  8/23/2019        Personal history of noncompliance with medical treatment ICD-10-CM: Z91.19  ICD-9-CM: V15.81  8/23/2019        Hyponatremia ICD-10-CM: E87.1  ICD-9-CM: 276.1  8/23/2019            Principal Problem:    Diabetic infection of right foot ([facility] (8/24/2019)    Active Problems:    Cellulitis of right leg (8/23/2019)      Foreign body (FB) in soft tissue (8/23/2019)      Subcutaneous emphysema ([facility] (8/23/2019)      Uncontrolled type II diabetes mellitus ([facility] (8/23/2019)      Personal history of noncompliance with medical treatment (8/23/2019)      Hyponatremia (8/23/2019)      Sepsis ([facility] (8/24/2019)         Diabetic right foot infection - limb threatening  S/p right 5th toe amputation and distal plantar forefoot debridement with extraction of glass (FB)  OR cultures --->gm + cocci with alpha Strep  Defer Abx modification to Hospitalists and ID     He still may lose his right foot  I recommended SNF placement for 1-2 months  He needs to be complete non-weight bearing for right foot   and he won't be complaint with treatment (diabetic, dressings, off-loading, etc...)  if we send him home as he has no family or support  I wrote his outpt wound care order and follow-up with us in the discharge instructions section of connect care      I will sign off for now and see him at the [place] [id number] approx 2 weeks after discharge (or sooner if needed). I wrote wound care orders and follow-up in his discharge instructions section of connect [facility]. PVD  No significant arterial stenosis noted on non-invasive arterial duplex on 8/26/19    Uncontrolled DM  Inpt diabetic control  Sliding scale and semiquants    Non-Compliance with outpt DM regimen  Encourage compliance  Risks of non-compliance discussed including amputation and early death  He needs SNF            I have personally performed a face-to-face diagnostic evaluation and management  service on this patient. I have independently seen the patient. I have independently obtained the above history from the patient/family. I have independently examined the patient with above findings. I have independently reviewed data/labs for this patient and developed the above plan of care (MDM). Signed: Malcolm Harvey.  Karlie Paris MD, FACS

## 2019-09-02 LAB
ANION GAP SERPL CALC-SCNC: 6 MMOL/L (ref 7–16)
BACTERIA SPEC CULT: NORMAL
BUN SERPL-MCNC: 10 MG/DL (ref 6–23)
CALCIUM SERPL-MCNC: 7 MG/DL (ref 8.3–10.4)
CHLORIDE SERPL-SCNC: 106 MMOL/L (ref 98–107)
CO2 SERPL-SCNC: 27 MMOL/L (ref 21–32)
CREAT SERPL-MCNC: 0.62 MG/DL (ref 0.8–1.5)
GLUCOSE BLD STRIP.AUTO-MCNC: 115 MG/DL (ref 65–100)
GLUCOSE BLD STRIP.AUTO-MCNC: 138 MG/DL (ref 65–100)
GLUCOSE BLD STRIP.AUTO-MCNC: 172 MG/DL (ref 65–100)
GLUCOSE BLD STRIP.AUTO-MCNC: 192 MG/DL (ref 65–100)
GLUCOSE SERPL-MCNC: 96 MG/DL (ref 65–100)
MM INDURATION POC: 0 MM (ref 0–5)
POTASSIUM SERPL-SCNC: 3 MMOL/L (ref 3.5–5.1)
PPD POC: NEGATIVE NEGATIVE
SERVICE CMNT-IMP: NORMAL
SODIUM SERPL-SCNC: 139 MMOL/L (ref 136–145)

## 2019-09-02 PROCEDURE — 65270000029 HC RM PRIVATE

## 2019-09-02 PROCEDURE — 82962 GLUCOSE BLOOD TEST: CPT

## 2019-09-02 PROCEDURE — 97110 THERAPEUTIC EXERCISES: CPT

## 2019-09-02 PROCEDURE — 80048 BASIC METABOLIC PNL TOTAL CA: CPT

## 2019-09-02 PROCEDURE — 74011250636 HC RX REV CODE- 250/636: Performed by: INTERNAL MEDICINE

## 2019-09-02 PROCEDURE — 74011250637 HC RX REV CODE- 250/637: Performed by: SURGERY

## 2019-09-02 PROCEDURE — 74011250636 HC RX REV CODE- 250/636: Performed by: SURGERY

## 2019-09-02 PROCEDURE — 36592 COLLECT BLOOD FROM PICC: CPT

## 2019-09-02 PROCEDURE — 74011636637 HC RX REV CODE- 636/637: Performed by: SURGERY

## 2019-09-02 PROCEDURE — 97530 THERAPEUTIC ACTIVITIES: CPT

## 2019-09-02 PROCEDURE — 74011250637 HC RX REV CODE- 250/637: Performed by: FAMILY MEDICINE

## 2019-09-02 PROCEDURE — 74011000258 HC RX REV CODE- 258: Performed by: INTERNAL MEDICINE

## 2019-09-02 PROCEDURE — 97535 SELF CARE MNGMENT TRAINING: CPT

## 2019-09-02 RX ORDER — POTASSIUM CHLORIDE 20 MEQ/1
40 TABLET, EXTENDED RELEASE ORAL
Status: COMPLETED | OUTPATIENT
Start: 2019-09-02 | End: 2019-09-02

## 2019-09-02 RX ADMIN — LISINOPRIL 40 MG: 20 TABLET ORAL at 08:00

## 2019-09-02 RX ADMIN — Medication 600 UNITS: at 05:56

## 2019-09-02 RX ADMIN — INSULIN LISPRO 2 UNITS: 100 INJECTION, SOLUTION INTRAVENOUS; SUBCUTANEOUS at 17:54

## 2019-09-02 RX ADMIN — INSULIN GLARGINE 10 UNITS: 100 INJECTION, SOLUTION SUBCUTANEOUS at 08:02

## 2019-09-02 RX ADMIN — ENOXAPARIN SODIUM 40 MG: 40 INJECTION SUBCUTANEOUS at 07:55

## 2019-09-02 RX ADMIN — Medication 10 ML: at 14:25

## 2019-09-02 RX ADMIN — CEFTRIAXONE SODIUM 2 G: 2 INJECTION, POWDER, FOR SOLUTION INTRAMUSCULAR; INTRAVENOUS at 17:53

## 2019-09-02 RX ADMIN — ACETAMINOPHEN 650 MG: 325 TABLET, FILM COATED ORAL at 07:59

## 2019-09-02 RX ADMIN — Medication 600 UNITS: at 14:24

## 2019-09-02 RX ADMIN — Medication 600 UNITS: at 21:55

## 2019-09-02 RX ADMIN — POTASSIUM CHLORIDE 40 MEQ: 20 TABLET, EXTENDED RELEASE ORAL at 08:06

## 2019-09-02 RX ADMIN — SPIRONOLACTONE: 25 TABLET ORAL at 08:00

## 2019-09-02 RX ADMIN — AMLODIPINE BESYLATE 5 MG: 5 TABLET ORAL at 08:00

## 2019-09-02 RX ADMIN — Medication 10 ML: at 06:00

## 2019-09-02 RX ADMIN — INSULIN LISPRO 3 UNITS: 100 INJECTION, SOLUTION INTRAVENOUS; SUBCUTANEOUS at 21:55

## 2019-09-02 NOTE — PROGRESS NOTES
Problem: Mobility Impaired (Adult and Pediatric)  Goal: *Acute Goals and Plan of Care (Insert Text)  Description  DISCHARGE GOALS RE-ASSESSED & MODIFIED 8/31/19 :  (1.)Mr. Sherry Vance will move from supine to sit and sit to supine  with MODIFIED INDEPENDENCE. (2.)Mr. Sherry Vance will transfer from bed to chair and chair to bed with SUPERVISION, using an off loading boot right & walker. 3) pt can apply boot properly. ADD GOAL 9/1/19 : 4) pt independent with UE T-band strengthening. PLAN TO SET AMBULATION  & STAIR GOALS WHEN SURGEON ALLOWS THESE ACTIVITIES.      ________________________________________________________________________________________________   Outcome: Progressing Towards Goal     PHYSICAL THERAPY: Daily Note and AM 9/2/2019  INPATIENT: PT Visit Days : 3  Payor: MEDICAID PENDING / Plan: [employer]utosh [employer] PENDING / Product Type: Medicaid /       NAME/AGE/GENDER: Chantal Mcintosh is a 49 y.o. male   PRIMARY DIAGNOSIS: Cellulitis of right leg [Y88.813] Diabetic infection of right foot ([address]   Diabetic infection of right foot ([address]    Procedure(s) (LRB):  Right foot debridment, removal foreign body, right 5th toe amputation  (Right)  8 Days Post-Op  ICD-10: Treatment Diagnosis:    · Generalized Muscle Weakness (M62.81)  · Other lack of cordination (R27.8)   Precaution/Allergies:  Patient has no known allergies. ASSESSMENT:     Mr. Sherry Vance has progressed well with off loading boot. CLARIFICATION : Dr Mg Montgomery does not want ambulation for pt, he has allowed pt to use an off loading boot on the right to allow pressure only on the heel  &  NO PRESSURE BEYOND THE MID ARCH OF THE FOOT DURING TRANSFERS. Today pt performed repeated transfers, practiced george / doff off loading boot & reviewed UE strengthening with T-band & pt participated very well. This section established at most recent assessment   PROBLEM LIST (Impairments causing functional limitations):  1. Decreased Strength  2.  Decreased ADL/Functional Activities  3. Decreased Transfer Abilities  4. Decreased Ambulation Ability/Technique  5. Decreased Balance  6. Decreased Activity Tolerance   INTERVENTIONS PLANNED: (Benefits and precautions of physical therapy have been discussed with the patient.)  1. Balance Exercise  2. Bed Mobility  3. Therapeutic Activites  4. Therapeutic Exercise/Strengthening  5. Transfer Training     TREATMENT PLAN: Frequency/Duration: daily for duration of hospital stay  Rehabilitation Potential For Stated Goals: Good     REHAB RECOMMENDATIONS (at time of discharge pending progress):    Placement: It is my opinion, based on this patient's performance to date, that Mr. Mirta Coker may benefit from intensive therapy at an [address] [place] [address] after discharge due to potential to make ongoing and sustainable functional improvement that is of practical value. .  Equipment:    To be determined               HISTORY:   History of Present Injury/Illness (Reason for Referral):  Patient is a 49years old male with history of NDDM and non complicance presents with redness, swelling, pain of R foot and low grade fever for last 5 days. He do not recall injuries but he walks bare foot not infrequently. He has no PCP, on no medications for years despite his need for low dose insulin 2-3 y ago. Past Medical History/Comorbidities:   Mr. Mirta Coker  has a past medical history of Diabetes ([address] Mr. Mirta Coker  has a past surgical history that includes hx cholecystectomy. Social History/Living Environment:   Home Environment: Private residence  # Steps to Enter: 5  Rails to Enter: Yes  Hand Rails : Right  One/Two Story Residence: One story  Living Alone: Yes(Room mate but alone during the day)  Support Systems: Friends \ neighbors  Patient Expects to be Discharged to[de-identified] Private residence  Current DME Used/Available at Home: None  Prior Level of Function/Work/Activity:  Pt was independent without an assistive device prior to this admission.   Personal Factors: Other factors that influence how disability is experienced by the patient:  current & PMH    Number of Personal Factors/Comorbidities that affect the Plan of Care: 3+: HIGH COMPLEXITY   EXAMINATION:   Most Recent Physical Functioning:   Gross Assessment:  AROM: Generally decreased, functional(left LE & both UE's)  Strength: Generally decreased, functional(left LE & both UE's)  Coordination: Generally decreased, functional(left LE & both UE's)                    Balance:  Sitting: Intact; Without support  Standing: Impaired; With support(walker & off loading boot) Bed Mobility:  Supine to Sit: (NT)  Sit to Supine: (NT)  Scooting: Supervision       Transfers:  Sit to Stand: Stand-by assistance  Stand to Sit: Stand-by assistance  Bed to Chair: Stand-by assistance(walker & off loading boot)  Duration: 11 Minutes(extra time to work through activity noted)  Gait:  Right Side Weight Bearing: (NWB but must transfer with off load boot for safety)      Functional Mobility:         Transfers:  sba        Bed Mobility:  NT   Body Structures Involved:  1. Metabolic  2. Muscles Body Functions Affected:  1. Movement Related  2. Metobolic/Endocrine Activities and Participation Affected:  1. General Tasks and Demands  2. Mobility   Number of elements that affect the Plan of Care: 4+: HIGH COMPLEXITY   CLINICAL PRESENTATION:   Presentation: Evolving clinical presentation with unstable and unpredictable characteristics: HIGH COMPLEXITY   CLINICAL DECISION MAKIN [facility] Mobility Inpatient Short Form  How much difficulty does the patient currently have. .. Unable A Lot A Little None   1. Turning over in bed (including adjusting bedclothes, sheets and blankets)? ? 1   ? 2   ? 3   ? 4   2. Sitting down on and standing up from a chair with arms ( e.g., wheelchair, bedside commode, etc.)   ? 1   ? 2   ? 3   ? 4   3.   Moving from lying on back to sitting on the side of the bed?   ? 1   ? 2   ? 3   ? 4   How much help from another person does the patient currently need. .. Total A Lot A Little None   4. Moving to and from a bed to a chair (including a wheelchair)? ? 1   ? 2   ? 3   ? 4   5. Need to walk in hospital room? ? 1   ? 2   ? 3   ? 4   6. Climbing 3-5 steps with a railing? ? 1   ? 2   ? 3   ? 4   © 2007, Trustees of [address] [place] [address] [address] [id number], under license to [employer]. All rights reserved      Score:  Initial: 14 Most Recent: X (Date: -- )    Interpretation of Tool:  Represents activities that are increasingly more difficult (i.e. Bed mobility, Transfers, Gait). Medical Necessity:     · Patient is expected to demonstrate progress in strength, balance, coordination and functional technique  ·  to decrease assistance required with bed mobility & transfers. · .  Reason for Services/Other Comments:  · Patient continues to require skilled intervention due to pt being unsafe with functional mobility   · . Use of outcome tool(s) and clinical judgement create a POC that gives a: Difficult prediction of patient's progress: HIGH COMPLEXITY            TREATMENT:   (In addition to Assessment/Re-Assessment sessions the following treatments were rendered)   Pre-treatment Symptoms/Complaints:  \" Am I really doing better? \"  Pain: Initial: numeric scale  Pain Intensity 1: 0  Pain Location 1: Foot  Pain Orientation 1: Right  Pain Intervention(s) 1: Repositioned  Post Session: 0/10     Therapeutic Activity: (  11 Minutes(extra time to work through activity noted) ):  Therapeutic activities including Bed mobility, transfers repeatedly chair<>BSC<>chair<>bed, reviewed safety with RN & CNA to encourage pt's independence to improve mobility, strength, balance and coordination. Required minimal verbal cues and manual cues   to promote static and dynamic balance in standing.    Therapeutic Exercise: (12 Minutes):  Exercises : UE T-band exercises reviewed - triceps to the side & in front along with bench press & overhead press to improve mobility, strength, balance, coordination and dynamic movement of arm - bilateral to improve functional use of UE with functional transfers. Braces/Orthotics/Lines/Etc:   · IV  · off loading boot right  Treatment/Session Assessment:    · Response to Treatment: This pt being DC'ed to home alone would be an unsafe DC. Pt is progressing but can not care for self, pt needs SNF. · Interdisciplinary Collaboration:   o Registered Nurse  o   · After treatment position/precautions:   o Up in chair  o Bed/Chair-wheels locked  o Call light within reach  o RN notified   · Compliance with Program/Exercises: Will assess as treatment progresses  · Recommendations/Intent for next treatment session: \"Next visit will focus on reduction in assistance provided\".   Total Treatment Duration:  PT Patient Time In/Time Out  Time In: 1524  Time Out: [address], PT

## 2019-09-02 NOTE — PROGRESS NOTES
After reviewing chart, the plan over the weekend was for pt to possible go to a STR with \"Medicaid Pending\". Per [facility] , pt will most likely qualify for Medicaid, once all documentation has been submitted. At this time no documentation has been submitted, b/c pt has been in the hospital and there has been no one at home gather his financial information for him. I spoke with Emilia Todd @ [facility], she was under the impression that the pt \"already had Medicaid Pending, which he does not. She will not be able to take the pt, the Medicaid process can take up to 6 months. I will clarify with There Sana Kaur in [facility] on Tuesday about pt's status. But I feel that the original plan of d/c home with [place] services, or a RN/SW/PT/Aide with [facility] is the best and ONLY option at this time. The SW through [place] can help pt with the Medicaid process at that time.    Also referred pt to Salome Urban with the [facility] program.

## 2019-09-02 NOTE — PROGRESS NOTES
Problem: Self Care Deficits Care Plan (Adult)  Goal: *Acute Goals and Plan of Care (Insert Text)  Description  Patient will complete lower body dressing with supervision to increase self care independence. Patient will complete toileting transfer with supervision and NWB RLE. Patient will complete bathing with supervision to increase self care independence. PROGRESSING  Patient will complete all functional transfers with supervision and NWB RLE    Timeframe: 7 visits          OCCUPATIONAL THERAPY: Daily Note and AM 9/2/2019  INPATIENT: OT Visit Days: 6  NWB RLE  Payor: MEDICAID PENDING / Plan: Alonso Montes PENDING / Product Type: Medicaid /      NAME/AGE/GENDER: Kaiden Blankenship is a 49 y.o. male   PRIMARY DIAGNOSIS:  Cellulitis of right leg [E09.691] Diabetic infection of right foot ([address]   Diabetic infection of right foot ([address]    Procedure(s) (LRB):  Right foot debridment, removal foreign body, right 5th toe amputation  (Right)  8 Days Post-Op  ICD-10: Treatment Diagnosis:    · Difficulty in walking, Not elsewhere classified (R26.2)   Precautions/Allergies:     Patient has no known allergies. ASSESSMENT:     Mr. Shane Brower presents supine in bed. NWB RLE. Patient is below baseline due to decreased balance and stnregth dur to R foot infection and WB restrictions. He was able to SPT from bed to recliner using a RW, but was not able to maintain NWB the entire time, at times he had his heel on the ground, he did not put midfoot or toes on the ground. Extra time explaining importance of NWB, patient with partial understanding. He also has some cognitive limitation when following verbal instructions during transfers, he does work hard. He will need continued OT for ADL's and transfers during ADL's. Could benefit from short term intensive rehab. Initiate OT POC.       8/28/19 1020am. Patient up in recliner he completed sponge bath,  Grooming and  from recliner as charted below.  He performed B chair push ups to assist with transfers. He is in good spirits and acknowledged that his in NWB on R LE at all times. He is sitting up in recliner all needs in reach. Patients finger nails very soiled educated him to clean under his nails while he is sitting watching TV. He agreed all needs in reach. He would continue to benefit from skilled OT services, will continue with established POC.    8/30/19 Patient wants to use toilet, donned ortho boot with assist. [facility] and needs to use commode and SPT only. He is safer in boot, but still does not need to walk, due to his difficulty with NWB. Completed UE exercises in bed sitting up with red band. Continue OT.     8/31/19 Pt was supine in bed upon arrival. Pt stated he had just called the nurse to wash him up. Pt stated, \"This is what they do I lay here and they wash me. \" Therapist exclaimed that she will help him with a ADL. Pt completed bed mobility with supervision. Pt completed sponge bath with set up while sitting on EOB. Pt stood with walker to assist with brief change. Pt required min A for bowel hygiene. Pt was able to don/doff sock and boot with min A. Pt is progressing towards goals. Continue POC.     9/1/19 Improving ability to don orthosis and safe SPT to chair as well as safe dressing. In recliner able to complete UE exercises using red band. Progressing. 9/2/19. S/u for donning orthosis. Txf from bed to chair to toilet and back to chair with improving quality. Still needs reminders for no weight at times. Continue to progress. This section established at most recent assessment   PROBLEM LIST (Impairments causing functional limitations):  1. Decreased Strength  2. Decreased ADL/Functional Activities  3. Decreased Transfer Abilities  4. Decreased Balance  5. Increased Pain  6. Decreased Activity Tolerance  7.  Decreased Cognition   INTERVENTIONS PLANNED: (Benefits and precautions of occupational therapy have been discussed with the patient.)  1. Activities of daily living training  2. Neuromuscular re-eduation  3. Therapeutic activity  4. Therapeutic exercise     TREATMENT PLAN: Frequency/Duration: Follow patient 3x a week to address above goals. Rehabilitation Potential For Stated Goals: Good     REHAB RECOMMENDATIONS (at time of discharge pending progress):    Placement: It is my opinion, based on this patient's performance to date, that Mr. Juana Marin may benefit from participating in 1-2 additional therapy sessions in order to continue to assess for rehab potential and then make recommendation for disposition at discharge. Equipment:    None at this time              OCCUPATIONAL PROFILE AND HISTORY:   History of Present Injury/Illness (Reason for Referral):  See H&P  Past Medical History/Comorbidities:   Mr. Juana Marin  has a past medical history of Diabetes ([facility] Mr. Juana Marin  has a past surgical history that includes hx cholecystectomy. Social History/Living Environment:   Home Environment: Private residence  # Steps to Enter: 5  Rails to Enter: Yes  Hand Rails : Right  One/Two Story Residence: One story  Living Alone: Yes(Room mate but alone during the day)  Support Systems: Friends \ neighbors  Patient Expects to be Discharged to[de-identified] Private residence  Current DME Used/Available at Home: None  Prior Level of Function/Work/Activity:  Lives with a roommate. Was independent all adl's and transfers.       Number of Personal Factors/Comorbidities that affect the Plan of Care: Brief history (0):  LOW COMPLEXITY   ASSESSMENT OF OCCUPATIONAL PERFORMANCE[de-identified]   Activities of Daily Living:   Basic ADLs (From Assessment) Complex ADLs (From Assessment)   Feeding: Independent  Oral Facial Hygiene/Grooming: Independent  Bathing: Minimum assistance  Upper Body Dressing: Setup  Lower Body Dressing: Stand-by assistance  Toileting: Contact guard assistance     Grooming/Bathing/Dressing Activities of Daily Living                           Lower Body Dressing Assistance  Socks: Minimim assistance  Cues: Doff;Don;Physical assistance    Boot: min A       Most Recent Physical Functioning:   Gross Assessment:                  Posture:     Balance:    Bed Mobility:     Wheelchair Mobility:     Transfers:               Patient Vitals for the past 6 hrs:   BP BP Patient Position SpO2 Pulse   19 1157 (!) 160/97 At rest 94 % 65       Mental Status  Neurologic State: Alert  Orientation Level: Oriented X4  Cognition: Follows commands  Perception: Appears intact  Perseveration: No perseveration noted  Safety/Judgement: Fall prevention                          Physical Skills Involved:  1. Range of Motion  2. Balance  3. Strength  4. Activity Tolerance  5. Gross Motor Control  6. Skin Integrity Cognitive Skills Affected (resulting in the inability to perform in a timely and safe manner):  1. Perception  2. Command followign  Psychosocial Skills Affected:  1. Environmental Adaptation  2. Self-Awareness   Number of elements that affect the Plan of Care: 3-5:  MODERATE COMPLEXITY   CLINICAL DECISION MAKIN[address] [place] [address] AM-PAC 6 Clicks   Daily Activity Inpatient Short Form  How much help from another person does the patient currently need. .. Total A Lot A Little None   1. Putting on and taking off regular lower body clothing? ? 1   ? 2   ? 3   ? 4   2. Bathing (including washing, rinsing, drying)? ? 1   ? 2   ? 3   ? 4   3. Toileting, which includes using toilet, bedpan or urinal?   ? 1   ? 2   ? 3   ? 4   4. Putting on and taking off regular upper body clothing? ? 1   ? 2   ? 3   ? 4   5. Taking care of personal grooming such as brushing teeth? ? 1   ? 2   ? 3   ? 4   6. Eating meals? ? 1   ? 2   ? 3   ? 4   © , Trustees of [address] [place] [id number], under license to [employer].  All rights reserved      Score:  Initial: 17 Most Recent: X (Date: -- )    Interpretation of Tool:  Represents activities that are increasingly more difficult (i.e. Bed mobility, Transfers, Gait). Medical Necessity:     · Skilled intervention continues to be required due to Deficits listed abvoe. Reason for Services/Other Comments:  · Patient continues to require skilled intervention due to right foot infection and NWB   · .   Use of outcome tool(s) and clinical judgement create a POC that gives a: MODERATE COMPLEXITY         TREATMENT:   (In addition to Assessment/Re-Assessment sessions the following treatments were rendered)     Pre-treatment Symptoms/Complaints:    Pain: Initial:   Pain Intensity 1: 0  Post Session:  0     Self Care: (15): Procedure(s) (per grid) utilized to improve and/or restore self-care/home management as related to dressing, bathing and toileting. Required min verbal and manual cueing to facilitate activities of daily living skills. Therapeutic Exercise: (10 minutes):  Exercises per grid below to improve strength and balance. Required moderate verbal and tactile cues to promote proper body posture. Progressed resistance, range and repetitions as indicated. Date:  8/28/19 Date:  8/30/19  Red band Date:  9/1/19  Red band 9-2-19  Red band    Activity/Exercise Parameters Parameters   Parameters   Chair pushups (backside off chair R LE EXTENDED OUT NWB) 15 reps       Horizontal abduction  15 reps 2 sets of 15 2 sets of 20    Chest press  15 reps 2 sets of 15 2 sets of 20    Scapular rows  15 reps 2 sets of 15 2 sets of 20                                  Braces/Orthotics/Lines/Etc:   · IV Had PICC LINE  Treatment/Session Assessment:    · Response to Treatment:  good,  Motivated to participate   · Interdisciplinary Collaboration:   o Certified Occupational Therapy Assistant  o Registered Nurse  · After treatment position/precautions:   o Up in chair  o Bed/Chair-wheels locked  o Bed in low position  o Call light within reach  o RN notified   · Compliance with Program/Exercises: Compliant all of the time.   · Recommendations/Intent for next treatment session: \"Next visit will focus on advancements to more challenging activities and reduction in assistance provided\".   Total Treatment Duration:  OT Patient Time In/Time Out  Time In: 1440  Time Out: Edin Beard 40, OT

## 2019-09-02 NOTE — PROGRESS NOTES
Hospitalist Progress Note    2019  Admit Date: 2019  6:07 PM   NAME: Kaiden Blankenship   :  1965   MRN:  [id number]   Attending: Hillary Clements DO  PCP:  None    SUBJECTIVE:   Patient is a 49years old male with history of NDDM and non complicance presents with redness, swelling, pain of R foot and low grade fever for last 5 days. He do not recall injuries but he walks bare foot not infrequently. He has no PCP, on no medications for years despite his need for low dose insulin 2-3 y ago. Surgical debridement and removal of glass foreign body, and R 5th toe amputation .      : Katarina Gum. Await STR approval. Continuing to adjust antihypertensives. Some progress. K low. Review of Systems negative with exception of pertinent positives noted above  PHYSICAL EXAM     Visit Vitals  /84 (BP 1 Location: Left arm, BP Patient Position: At rest)   Pulse 61   Temp 97.5 °F (36.4 °C)   Resp 18   Ht 5' 9\" (1.753 m)   Wt 95.3 kg (210 lb)   SpO2 93%   BMI 31.01 kg/m²      Temp (24hrs), Av.9 °F (36.6 °C), Min:96.9 °F (36.1 °C), Max:98.4 °F (36.9 °C)    Oxygen Therapy  O2 Sat (%): 93 % (19 0721)  O2 Device: Room air (19 0700)  O2 Flow Rate (L/min): 2 l/min (19 0930)    Intake/Output Summary (Last 24 hours) at 2019 1006  Last data filed at 2019 8670  Gross per 24 hour   Intake 50 ml   Output 3450 ml   Net -3400 ml      General: No acute distress    Lungs:  CTA Bilaterally.    Heart:  Regular rate and rhythm,  No murmur, rub, or gallop  Abdomen: Soft, Non distended, Non tender, Positive bowel sounds  Extremities: Bandaging in place c/d/i  Neurologic:  No focal deficits    ASSESSMENT      Active Hospital Problems    Diagnosis Date Noted    Diabetic infection of right foot ([facility] 2019    Sepsis ([facility] 2019    Cellulitis of right leg 2019    Foreign body (FB) in soft tissue 2019    Subcutaneous emphysema ([facility] 2019    Uncontrolled type II diabetes mellitus [facility]) 08/23/2019    Personal history of noncompliance with medical treatment 08/23/2019    Hyponatremia 08/23/2019     Plan:    # Sepsis, R diabetic foot infection, strep anginosus bacteremia   - Surgery and ID following. Continue rocephin, EOT 9/8.  - CM following, may be able to do SNF for abx and NWB foot, working on dispo. If will not be able to, has plan in place for home infusions and HH.    - Strep anginosus bacteremia, intraop wound cx alpha strep and staph aureus, pansensitive. Rocephin EOT 9/8  - strict NWB per surgery 1-2 months. Ame signed off, f/u 2w after discharge at wound center. Daily dressing changes. # Paroxysmal atrial fibrillation  - observed during surgery and likely precipitated by sepsis  - patient spontaneously converted back to NSR  - no current indication for anticoagulation  - telemetry  - sepsis management, as above    # DM type II, uncontrolled  - cannot afford any medications and does not take anything at home   - Lantus 10 units qdaily  - Humalog SSI and serial CBGs  - goal CBG between 140 and 180 while inpatient  - diabetes education consultation  - HgbA1c of 9.7    # HTN: started and adjusting norvasc, aldactazide, lisinopril    F/E/N: off fluids, replete electrolytes as needed, diabetic diet    Ppx: Lovenox for VTE    Code Status: FULL CODE    Disposition: await final word regarding STR from . [facility] would be SNF for IVABx and therapy, NWB. If unable to qualify for medicaid pending status, orders are in place for [facility] infusions and PT, wound care. Severely limited by lack of insurance and social support.   Best real chance to salvage foot is STR and continued management BP and DM    Signed By: Danielito Joseph MD     September 2, 2019

## 2019-09-03 LAB
ANION GAP SERPL CALC-SCNC: 4 MMOL/L (ref 7–16)
ANION GAP SERPL CALC-SCNC: 4 MMOL/L (ref 7–16)
BUN SERPL-MCNC: 17 MG/DL (ref 6–23)
BUN SERPL-MCNC: 20 MG/DL (ref 6–23)
CALCIUM SERPL-MCNC: 9.3 MG/DL (ref 8.3–10.4)
CALCIUM SERPL-MCNC: 9.4 MG/DL (ref 8.3–10.4)
CHLORIDE SERPL-SCNC: 95 MMOL/L (ref 98–107)
CHLORIDE SERPL-SCNC: 95 MMOL/L (ref 98–107)
CO2 SERPL-SCNC: 32 MMOL/L (ref 21–32)
CO2 SERPL-SCNC: 32 MMOL/L (ref 21–32)
CREAT SERPL-MCNC: 0.98 MG/DL (ref 0.8–1.5)
CREAT SERPL-MCNC: 1.07 MG/DL (ref 0.8–1.5)
GLUCOSE BLD STRIP.AUTO-MCNC: 138 MG/DL (ref 65–100)
GLUCOSE BLD STRIP.AUTO-MCNC: 150 MG/DL (ref 65–100)
GLUCOSE BLD STRIP.AUTO-MCNC: 167 MG/DL (ref 65–100)
GLUCOSE BLD STRIP.AUTO-MCNC: 250 MG/DL (ref 65–100)
GLUCOSE SERPL-MCNC: 128 MG/DL (ref 65–100)
GLUCOSE SERPL-MCNC: 143 MG/DL (ref 65–100)
MAGNESIUM SERPL-MCNC: 1.8 MG/DL (ref 1.8–2.4)
POTASSIUM SERPL-SCNC: 4.1 MMOL/L (ref 3.5–5.1)
POTASSIUM SERPL-SCNC: 5.7 MMOL/L (ref 3.5–5.1)
SODIUM SERPL-SCNC: 131 MMOL/L (ref 136–145)
SODIUM SERPL-SCNC: 131 MMOL/L (ref 136–145)

## 2019-09-03 PROCEDURE — 82962 GLUCOSE BLOOD TEST: CPT

## 2019-09-03 PROCEDURE — 74011636637 HC RX REV CODE- 636/637: Performed by: SURGERY

## 2019-09-03 PROCEDURE — 74011000258 HC RX REV CODE- 258: Performed by: INTERNAL MEDICINE

## 2019-09-03 PROCEDURE — 74011250636 HC RX REV CODE- 250/636: Performed by: INTERNAL MEDICINE

## 2019-09-03 PROCEDURE — 83735 ASSAY OF MAGNESIUM: CPT

## 2019-09-03 PROCEDURE — 74011250637 HC RX REV CODE- 250/637: Performed by: SURGERY

## 2019-09-03 PROCEDURE — 65270000029 HC RM PRIVATE

## 2019-09-03 PROCEDURE — 74011250637 HC RX REV CODE- 250/637: Performed by: FAMILY MEDICINE

## 2019-09-03 PROCEDURE — 97535 SELF CARE MNGMENT TRAINING: CPT

## 2019-09-03 PROCEDURE — 80048 BASIC METABOLIC PNL TOTAL CA: CPT

## 2019-09-03 PROCEDURE — 74011250636 HC RX REV CODE- 250/636: Performed by: SURGERY

## 2019-09-03 PROCEDURE — 97110 THERAPEUTIC EXERCISES: CPT

## 2019-09-03 PROCEDURE — 97530 THERAPEUTIC ACTIVITIES: CPT

## 2019-09-03 RX ADMIN — ACETAMINOPHEN 650 MG: 325 TABLET, FILM COATED ORAL at 10:33

## 2019-09-03 RX ADMIN — Medication 600 UNITS: at 22:23

## 2019-09-03 RX ADMIN — INSULIN LISPRO 2 UNITS: 100 INJECTION, SOLUTION INTRAVENOUS; SUBCUTANEOUS at 13:08

## 2019-09-03 RX ADMIN — Medication 10 ML: at 13:54

## 2019-09-03 RX ADMIN — AMLODIPINE BESYLATE 5 MG: 5 TABLET ORAL at 08:58

## 2019-09-03 RX ADMIN — INSULIN LISPRO 5 UNITS: 100 INJECTION, SOLUTION INTRAVENOUS; SUBCUTANEOUS at 22:22

## 2019-09-03 RX ADMIN — ENOXAPARIN SODIUM 40 MG: 40 INJECTION SUBCUTANEOUS at 08:58

## 2019-09-03 RX ADMIN — Medication 600 UNITS: at 13:54

## 2019-09-03 RX ADMIN — CEFTRIAXONE SODIUM 2 G: 2 INJECTION, POWDER, FOR SOLUTION INTRAMUSCULAR; INTRAVENOUS at 15:22

## 2019-09-03 RX ADMIN — INSULIN LISPRO 2 UNITS: 100 INJECTION, SOLUTION INTRAVENOUS; SUBCUTANEOUS at 08:57

## 2019-09-03 RX ADMIN — SPIRONOLACTONE: 25 TABLET ORAL at 08:58

## 2019-09-03 RX ADMIN — Medication 600 UNITS: at 05:24

## 2019-09-03 RX ADMIN — LISINOPRIL 40 MG: 20 TABLET ORAL at 08:58

## 2019-09-03 RX ADMIN — INSULIN GLARGINE 10 UNITS: 100 INJECTION, SOLUTION SUBCUTANEOUS at 08:58

## 2019-09-03 RX ADMIN — Medication 10 ML: at 22:27

## 2019-09-03 NOTE — PROGRESS NOTES
Nutrition F/U   Assessment:  DIET DIABETIC CONSISTENT CARB Regular  DIET NUTRITIONAL SUPPLEMENTS Dinner; Glucerna Shake    Pt reports he has no problem with his appetite and usually eats 50-75% of his meals. His lunch tray is in his room and he says he may eat more of that meal in a little while. He does like and drink all the Glucerna shake almost every day. Macronutrient needs: 95 kg listed body weight  EER:  4497-3669 kcal /day (20-25 kcal/kg)  EPR:   grams protein/day (1-1.25 grams/kg)  Intake/Comparative Standards: Per RD meal rounds: 75% of lunch. Average intake for past 7 day(s)/4 recorded meal(s): 100%. This potentially meets ~100% of kcal and ~100% of protein needs with the additional intake of 1 Glucerna shake a day. Intervention:   Meals and snacks: Continue current diet. Nutrition Supplement Therapy: Change Glucerna shake to Ensure high protein once a day. Coordination of nutrition care:  IDT rounds.       Lupis Diaz, 67 [address], [address] [place] [address]

## 2019-09-03 NOTE — PROGRESS NOTES
Problem: Mobility Impaired (Adult and Pediatric)  Goal: *Acute Goals and Plan of Care (Insert Text)  Description  DISCHARGE GOALS RE-ASSESSED & MODIFIED 8/31/19 :  (1.)Mr. Lawana Krabbe will move from supine to sit and sit to supine  with MODIFIED INDEPENDENCE. (2.)Mr. Lawana Krabbe will transfer from bed to chair and chair to bed with SUPERVISION, using an off loading boot right & walker. 3) pt can apply boot properly. ADD GOAL 9/1/19 : 4) pt independent with UE T-band strengthening. PLAN TO SET AMBULATION  & STAIR GOALS WHEN SURGEON ALLOWS THESE ACTIVITIES.      ________________________________________________________________________________________________   Outcome: Progressing Towards Goal     PHYSICAL THERAPY: Daily Note and AM 9/3/2019  INPATIENT: PT Visit Days : 3  Payor: MEDICAID PENDING / Plan: Ayana Baxter PENDING / Product Type: Medicaid /       NAME/AGE/GENDER: Ely Tay is a 49 y.o. male   PRIMARY DIAGNOSIS: Cellulitis of right leg [F10.238] Diabetic infection of right foot ([address]   Diabetic infection of right foot ([address]    Procedure(s) (LRB):  Right foot debridment, removal foreign body, right 5th toe amputation  (Right)  9 Days Post-Op  ICD-10: Treatment Diagnosis:    · Generalized Muscle Weakness (M62.81)  · Other lack of cordination (R27.8)   Precaution/Allergies:  Patient has no known allergies. ASSESSMENT:     Mr. Lawana Krabbe has progressed well with off loading boot. CLARIFICATION : Dr Karlie Paris does not want ambulation for pt, he has allowed pt to use an off loading boot on the right to allow pressure only on the heel  &  NO PRESSURE BEYOND THE MID ARCH OF THE FOOT DURING TRANSFERS. Today pt performed repeated transfers, practiced george / doff off loading boot & reviewed UE strengthening with T-band & pt participated very well. 9/3 he practiced george/doff off loading boot& & review [address] on the foot. Performs exercises on B L LE without any problems. Also practice tranfers using RW with CGA-SBA. He participated well with therapy today. This section established at most recent assessment   PROBLEM LIST (Impairments causing functional limitations):  1. Decreased Strength  2. Decreased ADL/Functional Activities  3. Decreased Transfer Abilities  4. Decreased Ambulation Ability/Technique  5. Decreased Balance  6. Decreased Activity Tolerance   INTERVENTIONS PLANNED: (Benefits and precautions of physical therapy have been discussed with the patient.)  1. Balance Exercise  2. Bed Mobility  3. Therapeutic Activites  4. Therapeutic Exercise/Strengthening  5. Transfer Training     TREATMENT PLAN: Frequency/Duration: daily for duration of hospital stay  Rehabilitation Potential For Stated Goals: Good     REHAB RECOMMENDATIONS (at time of discharge pending progress):    Placement: It is my opinion, based on this patient's performance to date, that Mr. Nancy Shepard may benefit from intensive therapy at an [address] [place] [address] after discharge due to potential to make ongoing and sustainable functional improvement that is of practical value. .  Equipment:    To be determined               HISTORY:   History of Present Injury/Illness (Reason for Referral):  Patient is a 49years old male with history of NDDM and non complicance presents with redness, swelling, pain of R foot and low grade fever for last 5 days. He do not recall injuries but he walks bare foot not infrequently. He has no PCP, on no medications for years despite his need for low dose insulin 2-3 y ago. Past Medical History/Comorbidities:   Mr. Nancy Shepard  has a past medical history of Diabetes ([address] Mr. Nancy Shepard  has a past surgical history that includes hx cholecystectomy.   Social History/Living Environment:   Home Environment: Private residence  # Steps to Enter: 5  Rails to Enter: Yes  Hand Rails : Right  One/Two Story Residence: One story  Living Alone: Yes(Room mate but alone during the day)  Support Systems: Friends \ neighbors  Patient Expects to be Discharged to[de-identified] Private residence  Current DME Used/Available at Home: None  Prior Level of Function/Work/Activity:  Pt was independent without an assistive device prior to this admission. Personal Factors: Other factors that influence how disability is experienced by the patient:  current & PMH    Number of Personal Factors/Comorbidities that affect the Plan of Care: 3+: HIGH COMPLEXITY   EXAMINATION:   Most Recent Physical Functioning:   Gross Assessment:                       Balance:  Sitting: Intact; Without support  Standing: Impaired; With support Bed Mobility:  Supine to Sit: Supervision  Sit to Supine: Supervision  Scooting: Supervision       Transfers:  Sit to Stand: Stand-by assistance  Stand to Sit: Stand-by assistance  Bed to Chair: Stand-by assistance  Duration: 15 Minutes  Gait:  Right Side Weight Bearing: Non-weight bearing(with boot to off load the heel)      Functional Mobility:         Transfers:  sba        Bed Mobility:  NT   Body Structures Involved:  1. Metabolic  2. Muscles Body Functions Affected:  1. Movement Related  2. Metobolic/Endocrine Activities and Participation Affected:  1. General Tasks and Demands  2. Mobility   Number of elements that affect the Plan of Care: 4+: HIGH COMPLEXITY   CLINICAL PRESENTATION:   Presentation: Evolving clinical presentation with unstable and unpredictable characteristics: HIGH COMPLEXITY   CLINICAL DECISION MAKIN [facility] Mobility Inpatient Short Form  How much difficulty does the patient currently have. .. Unable A Lot A Little None   1. Turning over in bed (including adjusting bedclothes, sheets and blankets)? ? 1   ? 2   ? 3   ? 4   2. Sitting down on and standing up from a chair with arms ( e.g., wheelchair, bedside commode, etc.)   ? 1   ? 2   ? 3   ? 4   3.   Moving from lying on back to sitting on the side of the bed?   ? 1   ? 2   ? 3   ? 4   How much help from another person does the patient currently need. .. Total A Lot A Little None   4. Moving to and from a bed to a chair (including a wheelchair)? ? 1   ? 2   ? 3   ? 4   5. Need to walk in hospital room? ? 1   ? 2   ? 3   ? 4   6. Climbing 3-5 steps with a railing? ? 1   ? 2   ? 3   ? 4   © 2007, Trustees of [address] [place] [address], under license to [employer]. All rights reserved      Score:  Initial: 14 Most Recent: X (Date: -- )    Interpretation of Tool:  Represents activities that are increasingly more difficult (i.e. Bed mobility, Transfers, Gait). Medical Necessity:     · Patient is expected to demonstrate progress in strength, balance, coordination and functional technique  ·  to decrease assistance required with bed mobility & transfers. · .  Reason for Services/Other Comments:  · Patient continues to require skilled intervention due to pt being unsafe with functional mobility   · . Use of outcome tool(s) and clinical judgement create a POC that gives a: Difficult prediction of patient's progress: HIGH COMPLEXITY            TREATMENT:   (In addition to Assessment/Re-Assessment sessions the following treatments were rendered)   Pre-treatment Symptoms/Complaints:  \" Am I really doing better? \"  Pain: Initial: numeric scale  Pain Intensity 1: 0(2/10 after therapy)  Post Session: 0/10     Therapeutic Activity: (  15 Minutes ):  Therapeutic activities including Bed mobility, transfers repeatedly chair<>BSC<>chair<>bed, reviewed safety with RN & CNA to encourage pt's independence to improve mobility, strength, balance and coordination. Required minimal verbal cues and manual cues   to promote static and dynamic balance in standing.    Therapeutic Exercise: (15 Minutes):  Exercises :    Date:  9/3 Date:   Date:     Activity/Exercise Parameters Parameters Parameters   heelslides B 10     Hip abd/add B 10     SLR B10     Quad sets B 10                              Braces/Orthotics/Lines/Etc:   · IV  · off loading boot right  Treatment/Session Assessment:    · Response to Treatment:  Did well, but would not send pt home alone. · Interdisciplinary Collaboration:   o Registered Nurse  o   · After treatment position/precautions:   o Supine in bed  o Bed/Chair-wheels locked  o Call light within reach  o RN notified   · Compliance with Program/Exercises: Will assess as treatment progresses  · Recommendations/Intent for next treatment session: \"Next visit will focus on reduction in assistance provided\".   Total Treatment Duration:  PT Patient Time In/Time Out  Time In: 1115  Time Out: [address] [facility] QUINTON Fernandes

## 2019-09-03 NOTE — PROGRESS NOTES
Care Management Interventions  PCP Verified by CM: Yes  Mode of Transport at Discharge: [address] (CM Consult): Discharge Planning  Current Support Network: Own Home(Roommate )  Confirm Follow Up Transport: Friends  Plan discussed with Pt/Family/Caregiver: Yes  Freedom of Choice Offered: Yes  Discharge Location  Discharge Placement: Home with home health  Spoke with [facility], they have filled the pt's application for Medicaid as of last week. There is no up date information at this time. Per JOANN, Medicaid may or may not require any additional information from the pt. It depends on if he has any assets, how long he works and if they find any inconsistencies with his story. Regardless this process will take at least 90 days and a facility can receive retro pay for 30 days at the [facility]. [facility] is aware that the pt will be d/c home today, PT/RN/SW/Aide services have been arranged and [facility] is on there way to see and educate pt. Sonia Ruelas

## 2019-09-03 NOTE — PROGRESS NOTES
Shift assessment complete. Pt is alert and oriented x4. Bowel sounds active. RR even and unlabored. Dressing on right foot is clean, dry and intact. No complaints of pain. Bed in low and locked position with call light within reach.

## 2019-09-03 NOTE — PROGRESS NOTES
Assessment done via doc flow sheet. Pt resting in bed, alert & oriented x4, resp easy & regular, lung sound CTAB. Right foot with dressing intact, sensation present, pt denies pain at this time. Call light within reach, pt instructed to call for assistance as needed.

## 2019-09-03 NOTE — DIABETES MGMT
. Blood glucose range yesterday  with pt receiving a total 15 units of insulin (lantus 10 units and Humalog 5 units). Educated pt re: current basal/bolus regimen of Lantus 10 units daily and Humalog sliding scale coverage 4x/day ac and hs,  including type of insulin, timing with meals, onset, duration of effect, and peak of insulin dose. Pt verbalizes understanding. Will need review and reinforcement. Reviewed with pt: preparation and injection of insulin dose using both the vial and syringe method and insulin pen method. Pt did not want to return demonstrate proper insulin injection technique using injection model. Nursing will continue to have pt practice insulin self-injection when insulin dose is due. Pt also verbalizes understanding of site rotation, storage of insulin, and proper sharps disposal.  Stressed the importance of follow up care for diabetes management with PCP. Case management assisting with discharge planning. Encouraged pt to be compliant with medication regimen, monitoring blood glucose qid and to record in log book to bring to PCP appointments to assist with medication titration, following a consistent carbohydrate diet, and to work on lifestyle modifications at discharge as cleared by physician. Plan per case management is for pt [facility]. Patient would benefit from further diabetes education and supervision of diabetes regimen by home health nursing. Pt has no further questions at this time.

## 2019-09-03 NOTE — PROGRESS NOTES
Problem: Self Care Deficits Care Plan (Adult)  Goal: *Acute Goals and Plan of Care (Insert Text)  Description  Patient will complete lower body dressing with supervision to increase self care independence. Patient will complete toileting transfer with supervision and NWB RLE. Patient will complete bathing with supervision to increase self care independence. PROGRESSING  Patient will complete all functional transfers with supervision and NWB RLE    Timeframe: 7 visits          OCCUPATIONAL THERAPY: Daily Note and AM 9/3/2019  INPATIENT: OT Visit Days: 7  NWB RLE  Payor: MEDICAID PENDING / Plan: Evangelina Dorothy PENDING / Product Type: Medicaid /      NAME/AGE/GENDER: Karlee Salmeron is a 49 y.o. male   PRIMARY DIAGNOSIS:  Cellulitis of right leg [Z74.869] Diabetic infection of right foot ([address]   Diabetic infection of right foot ([address]    Procedure(s) (LRB):  Right foot debridment, removal foreign body, right 5th toe amputation  (Right)  9 Days Post-Op  ICD-10: Treatment Diagnosis:    · Difficulty in walking, Not elsewhere classified (R26.2)   Precautions/Allergies:     Patient has no known allergies. ASSESSMENT:     Mr. Kathy Nugent presents supine in bed. NWB RLE. Patient is below baseline due to decreased balance and stnregth dur to R foot infection and WB restrictions. He was able to SPT from bed to recliner using a RW, but was not able to maintain NWB the entire time, at times he had his heel on the ground, he did not put midfoot or toes on the ground. Extra time explaining importance of NWB, patient with partial understanding. He also has some cognitive limitation when following verbal instructions during transfers, he does work hard. He will need continued OT for ADL's and transfers during ADL's. Could benefit from short term intensive rehab. Initiate OT POC.       8/28/19 1020am. Patient up in recliner he completed sponge bath,  Grooming and  from recliner as charted below.  He performed B chair push ups to assist with transfers. He is in good spirits and acknowledged that his in NWB on R LE at all times. He is sitting up in recliner all needs in reach. Patients finger nails very soiled educated him to clean under his nails while he is sitting watching TV. He agreed all needs in reach. He would continue to benefit from skilled OT services, will continue with established POC.    8/30/19 Patient wants to use toilet, donned ortho boot with assist. Not safe [facility] and needs to use commode and SPT only. He is safer in boot, but still does not need to walk, due to his difficulty with NWB. Completed UE exercises in bed sitting up with red band. Continue OT.     8/31/19 Pt was supine in bed upon arrival. Pt stated he had just called the nurse to wash him up. Pt stated, \"This is what they do I lay here and they wash me. \" Therapist exclaimed that she will help him with a ADL. Pt completed bed mobility with supervision. Pt completed sponge bath with set up while sitting on EOB. Pt stood with walker to assist with brief change. Pt required min A for bowel hygiene. Pt was able to don/doff sock and boot with min A. Pt is progressing towards goals. Continue POC.     9/1/19 Improving ability to don orthosis and safe SPT to chair as well as safe dressing. In recliner able to complete UE exercises using red band. Progressing. 9/2/19. S/u for donning orthosis. Txf from bed to chair to toilet and back to chair with improving quality. Still needs reminders for no weight at times. Continue to progress. 9/3/19 CGA for txf from w/c to tub bench. CGA for bathing. Multiple garbage backs used to waterproof right foot. Patient is still impulsive and best placement is SNF. Continued cues for safe dressing as he wants to stand normal for donning and doffing, safer rolling in bed. This section established at most recent assessment   PROBLEM LIST (Impairments causing functional limitations):  1.  Decreased Strength  2. Decreased ADL/Functional Activities  3. Decreased Transfer Abilities  4. Decreased Balance  5. Increased Pain  6. Decreased Activity Tolerance  7. Decreased Cognition   INTERVENTIONS PLANNED: (Benefits and precautions of occupational therapy have been discussed with the patient.)  1. Activities of daily living training  2. Neuromuscular re-eduation  3. Therapeutic activity  4. Therapeutic exercise     TREATMENT PLAN: Frequency/Duration: Follow patient 3x a week to address above goals. Rehabilitation Potential For Stated Goals: Good     REHAB RECOMMENDATIONS (at time of discharge pending progress):    Placement: It is my opinion, based on this patient's performance to date, that Mr. Jayme Flynn may benefit from participating in 1-2 additional therapy sessions in order to continue to assess for rehab potential and then make recommendation for disposition at discharge. Equipment:    None at this time              OCCUPATIONAL PROFILE AND HISTORY:   History of Present Injury/Illness (Reason for Referral):  See H&P  Past Medical History/Comorbidities:   Mr. Jayme Flynn  has a past medical history of Diabetes ([address] Mr. Jayme Flynn  has a past surgical history that includes hx cholecystectomy. Social History/Living Environment:   Home Environment: Private residence  # Steps to Enter: 5  Rails to Enter: Yes  Hand Rails : Right  One/Two Story Residence: One story  Living Alone: Yes(Room mate but alone during the day)  Support Systems: Friends \ neighbors  Patient Expects to be Discharged to[de-identified] Private residence  Current DME Used/Available at Home: None  Prior Level of Function/Work/Activity:  Lives with a roommate. Was independent all adl's and transfers.       Number of Personal Factors/Comorbidities that affect the Plan of Care: Brief history (0):  LOW COMPLEXITY   ASSESSMENT OF OCCUPATIONAL PERFORMANCE[de-identified]   Activities of Daily Living:   Basic ADLs (From Assessment) Complex ADLs (From Assessment)   Feeding: Independent  Oral Facial Hygiene/Grooming: Setup  Bathing: Contact guard assistance  Upper Body Dressing: Setup  Lower Body Dressing: Contact guard assistance  Toileting: Contact guard assistance     Grooming/Bathing/Dressing Activities of Daily Living                       Functional Transfers  Bathroom Mobility: Contact guard assistance  Shower Transfer: Contact guard assistance   Lower Body Dressing Assistance  Socks: Minimim assistance  Cues: Doff;Don;Physical assistance    Boot: min A Bed/Mat Mobility  Sit to Stand: Stand-by assistance  Stand to Sit: Stand-by assistance  Bed to Chair: Stand-by assistance     Most Recent Physical Functioning:   Gross Assessment:                  Posture:     Balance:    Bed Mobility:     Wheelchair Mobility:     Transfers:  Sit to Stand: Stand-by assistance  Stand to Sit: Stand-by assistance  Bed to Chair: Stand-by assistance            Patient Vitals for the past 6 hrs:   BP BP Patient Position SpO2 Pulse   19 0754 122/72 At rest 98 % 84       Mental Status  Neurologic State: Alert  Orientation Level: Oriented X4  Cognition: Follows commands  Perception: Appears intact  Perseveration: No perseveration noted  Safety/Judgement: Fall prevention                          Physical Skills Involved:  1. Range of Motion  2. Balance  3. Strength  4. Activity Tolerance  5. Gross Motor Control  6. Skin Integrity Cognitive Skills Affected (resulting in the inability to perform in a timely and safe manner):  1. Perception  2. Command followign  Psychosocial Skills Affected:  1. Environmental Adaptation  2. Self-Awareness   Number of elements that affect the Plan of Care: 3-5:  MODERATE COMPLEXITY   CLINICAL DECISION MAKIN [facility] [address] [address] AM-PAC 6 Clicks   Daily Activity Inpatient Short Form  How much help from another person does the patient currently need. .. Total A Lot A Little None   1. Putting on and taking off regular lower body clothing? ? 1   ? 2   ? 3   ? 4   2. Bathing (including washing, rinsing, drying)? ? 1   ? 2   ? 3   ? 4   3. Toileting, which includes using toilet, bedpan or urinal?   ? 1   ? 2   ? 3   ? 4   4. Putting on and taking off regular upper body clothing? ? 1   ? 2   ? 3   ? 4   5. Taking care of personal grooming such as brushing teeth? ? 1   ? 2   ? 3   ? 4   6. Eating meals? ? 1   ? 2   ? 3   ? 4   © 2007, Trustees of [address] [place] [address] [address] [id number], under license to [employer]. All rights reserved      Score:  Initial: 17 Most Recent: X (Date: -- )    Interpretation of Tool:  Represents activities that are increasingly more difficult (i.e. Bed mobility, Transfers, Gait). Medical Necessity:     · Skilled intervention continues to be required due to Deficits listed abvoe. Reason for Services/Other Comments:  · Patient continues to require skilled intervention due to right foot infection and NWB   · .   Use of outcome tool(s) and clinical judgement create a POC that gives a: MODERATE COMPLEXITY         TREATMENT:   (In addition to Assessment/Re-Assessment sessions the following treatments were rendered)     Pre-treatment Symptoms/Complaints:    Pain: Initial:   Pain Intensity 1: 0  Post Session:  0     Self Care: (70): Procedure(s) (per grid) utilized to improve and/or restore self-care/home management as related to dressing, bathing and toileting. Required min verbal and manual cueing to facilitate activities of daily living skills.           Date:  8/28/19 Date:  8/30/19  Red band Date:  9/1/19  Red band 9-2-19  Red band    Activity/Exercise Parameters Parameters   Parameters   Chair pushups (backside off chair R LE EXTENDED OUT NWB) 15 reps       Horizontal abduction  15 reps 2 sets of 15 2 sets of 20    Chest press  15 reps 2 sets of 15 2 sets of 20    Scapular rows  15 reps 2 sets of 15 2 sets of 20                                  Braces/Orthotics/Lines/Etc:   · IV Had PICC LINE  Treatment/Session Assessment:    · Response to Treatment:  good, Motivated to participate   · Interdisciplinary Collaboration:   o Certified Occupational Therapy Assistant  o Registered Nurse  · After treatment position/precautions:   o Up in chair  o Bed/Chair-wheels locked  o Bed in low position  o Call light within reach  o RN notified   · Compliance with Program/Exercises: Compliant all of the time. · Recommendations/Intent for next treatment session: \"Next visit will focus on advancements to more challenging activities and reduction in assistance provided\".   Total Treatment Duration:  OT Patient Time In/Time Out  Time In: 0835  [facility]

## 2019-09-03 NOTE — PROGRESS NOTES
Malcolm Winslow with [facility] here to provide education for the pt, pt cannot go today b/c some of labs are off. D/C home will be there earliest as of tomorrow. Pt gave Malcolm Winslow a lot of push back about how he was not going to go back and forth to the refrigerator to get his antibiotic. \"i'll just use it warm\". We explained that this is not a good plan for him, that the medication needed to be refrigerated. Pt also states that we can teach him \"all we want to, but I may not do it\". Also that, \"I'm not going to  put any weight on this foot and she is wanting me to do that, then you all will take responsibility for my foot\". I explained we are not asking him to bear weight, we are trying to explain how the medication needs to be stored, I asked the pt if a wc would fit through his doorways, he could not answer that question. I have sent a referral into [facility] [facility]( #[phone]) for pt and they will be contacting pt shortly and if he qualifies they will be able to provide meals within 5- 7days form now. [facility] is going to meet with pt about d/c plans.

## 2019-09-04 VITALS
BODY MASS INDEX: 31.1 KG/M2 | RESPIRATION RATE: 20 BRPM | HEART RATE: 90 BPM | DIASTOLIC BLOOD PRESSURE: 75 MMHG | SYSTOLIC BLOOD PRESSURE: 121 MMHG | TEMPERATURE: 97.7 F | WEIGHT: 210 LBS | OXYGEN SATURATION: 95 % | HEIGHT: 69 IN

## 2019-09-04 LAB
GLUCOSE BLD STRIP.AUTO-MCNC: 118 MG/DL (ref 65–100)
GLUCOSE BLD STRIP.AUTO-MCNC: 148 MG/DL (ref 65–100)
GLUCOSE BLD STRIP.AUTO-MCNC: 174 MG/DL (ref 65–100)

## 2019-09-04 PROCEDURE — 74011250637 HC RX REV CODE- 250/637: Performed by: SURGERY

## 2019-09-04 PROCEDURE — 74011000258 HC RX REV CODE- 258: Performed by: INTERNAL MEDICINE

## 2019-09-04 PROCEDURE — 74011636637 HC RX REV CODE- 636/637: Performed by: SURGERY

## 2019-09-04 PROCEDURE — 82962 GLUCOSE BLOOD TEST: CPT

## 2019-09-04 PROCEDURE — 74011250636 HC RX REV CODE- 250/636: Performed by: INTERNAL MEDICINE

## 2019-09-04 PROCEDURE — 74011250636 HC RX REV CODE- 250/636: Performed by: SURGERY

## 2019-09-04 PROCEDURE — 90471 IMMUNIZATION ADMIN: CPT

## 2019-09-04 PROCEDURE — 90686 IIV4 VACC NO PRSV 0.5 ML IM: CPT | Performed by: INTERNAL MEDICINE

## 2019-09-04 PROCEDURE — 74011250637 HC RX REV CODE- 250/637: Performed by: FAMILY MEDICINE

## 2019-09-04 RX ORDER — INSULIN GLARGINE 100 [IU]/ML
INJECTION, SOLUTION SUBCUTANEOUS
Qty: 1 VIAL | Refills: 1 | Status: SHIPPED | OUTPATIENT
Start: 2019-09-05 | End: 2019-09-20

## 2019-09-04 RX ORDER — LISINOPRIL 40 MG/1
40 TABLET ORAL DAILY
Qty: 30 TAB | Refills: 1 | Status: SHIPPED | OUTPATIENT
Start: 2019-09-05

## 2019-09-04 RX ORDER — AMLODIPINE BESYLATE 5 MG/1
5 TABLET ORAL DAILY
Qty: 30 TAB | Refills: 1 | Status: SHIPPED | OUTPATIENT
Start: 2019-09-05

## 2019-09-04 RX ADMIN — CEFTRIAXONE SODIUM 2 G: 2 INJECTION, POWDER, FOR SOLUTION INTRAMUSCULAR; INTRAVENOUS at 15:47

## 2019-09-04 RX ADMIN — INSULIN LISPRO 2 UNITS: 100 INJECTION, SOLUTION INTRAVENOUS; SUBCUTANEOUS at 11:30

## 2019-09-04 RX ADMIN — ACETAMINOPHEN 650 MG: 325 TABLET, FILM COATED ORAL at 03:57

## 2019-09-04 RX ADMIN — Medication 600 UNITS: at 15:48

## 2019-09-04 RX ADMIN — SPIRONOLACTONE: 25 TABLET ORAL at 08:30

## 2019-09-04 RX ADMIN — INFLUENZA VIRUS VACCINE 0.5 ML: 15; 15; 15; 15 SUSPENSION INTRAMUSCULAR at 14:16

## 2019-09-04 RX ADMIN — LISINOPRIL 40 MG: 20 TABLET ORAL at 08:30

## 2019-09-04 RX ADMIN — AMLODIPINE BESYLATE 5 MG: 5 TABLET ORAL at 08:29

## 2019-09-04 RX ADMIN — ENOXAPARIN SODIUM 40 MG: 40 INJECTION SUBCUTANEOUS at 08:30

## 2019-09-04 RX ADMIN — Medication 600 UNITS: at 05:29

## 2019-09-04 RX ADMIN — Medication 10 ML: at 05:29

## 2019-09-04 RX ADMIN — INSULIN GLARGINE 10 UNITS: 100 INJECTION, SOLUTION SUBCUTANEOUS at 09:00

## 2019-09-04 NOTE — DISCHARGE INSTRUCTIONS
Wound Care Daily  Replace saline-moistened gauze packing on plantar right foot wound  Then cover with dry 4x4s and wrap with Kerlix   NO WEIGHT BEARING ON RIGHT FOOT  Use modified Demetrius for transfers     STRICT DIABETIC CONTROL      Follow-up with Dr Jon Carlson at the [place] [address] approx 2 weeks after discharge on a Thursday morning:  [place] [address]  21  from [address] [facility],[address]:    After general anesthesia or intravenous sedation, for 24 hours or while taking prescription Narcotics:  · Limit your activities  · Do not drive and operate hazardous machinery  · Do not make important personal or business decisions  · Do  not drink alcoholic beverages  · If you have not urinated within 8 hours after discharge, please contact your surgeon on call. Report the following to your surgeon:  · Excessive pain, swelling, redness or odor of or around the surgical area  · Temperature over 100.5  · Nausea and vomiting lasting longer than 4 hours or if unable to take medications  · Any signs of decreased circulation or nerve impairment to extremity: change in color, persistent  numbness, tingling, coldness or increase pain  · Any questions    What to do at Home:  Recommended activity: Activity as tolerated, see above instructions from Dr. Jon Carlson, IV antibiotics per [facility] services, diabetic diet,     If you experience any of the following symptoms: worsening of wound, fever/chills, shortness of breath, chest pain, any other concerning symptoms, return to ER and please follow up with Dr. Jon Carlson. *  Please give a list of your current medications to your Primary Care Provider. *  Please update this list whenever your medications are discontinued, doses are      changed, or new medications (including over-the-counter products) are added. *  Please carry medication information at all times in case of emergency situations.     These are general instructions for a healthy lifestyle:    No smoking/ No tobacco products/ Avoid exposure to second hand smoke  Surgeon General's Warning:  Quitting smoking now greatly reduces serious risk to your health. Obesity, smoking, and sedentary lifestyle greatly increases your risk for illness    A healthy diet, regular physical exercise & weight monitoring are important for maintaining a healthy lifestyle    You may be retaining fluid if you have a history of heart failure or if you experience any of the following symptoms:  Weight gain of 3 pounds or more overnight or 5 pounds in a week, increased swelling in our hands or feet or shortness of breath while lying flat in bed. Please call your doctor as soon as you notice any of these symptoms; do not wait until your next office visit. The discharge information has been reviewed with the patient. The patient verbalized understanding. Discharge medications reviewed with the patient and appropriate educational materials and side effects teaching were provided. ___________________________________________________________________________________________________________________________________  Patient Education        Diabetic Foot Ulcer: Care Instructions  Your Care Instructions  Diabetes can damage the nerve endings and blood vessels in your feet. That means you are less likely to notice when your feet are injured. A small skin problem like a callus, blister, or cracked skin can turn into a larger sore, called a foot ulcer. Foot ulcers form most often on the pad (ball) of the foot or the bottom of the big toe. You can also get them on the top and bottom of each toe. Foot ulcers can get infected. If the infection is severe, then tissue in the foot can die. This is called gangrene. In that case, one or more of the toes, part or all of the foot, and sometimes part of the leg may have to be removed (amputated).   Your doctor may have removed the dead tissue and cleaned the ulcer. Your foot wound may be wrapped in a protective bandage. It is very important to keep your weight off your injured foot. After a foot ulcer has formed, it will not heal as long as you keep putting weight on the area. Always get early treatment for foot problems. A minor irritation can lead to a major problem if it's not taken care of soon. Follow-up care is a key part of your treatment and safety. Be sure to make and go to all appointments, and call your doctor if you are having problems. It's also a good idea to know your test results and keep a list of the medicines you take. How can you care for yourself at home? · Follow your doctor's instructions about keeping pressure off the foot ulcer. You may need to use crutches or a wheelchair. Or you may wear a cast or a walking boot. · Follow your doctor's instructions on how to clean the ulcer and change the bandage. · If your doctor prescribed antibiotics, take them as directed. Do not stop taking them just because you feel better. You need to take the full course of antibiotics. To prevent foot ulcers  · Keep your blood sugar close to normal by watching what and how much you eat. Track your blood sugar, take medicines if prescribed, and get regular exercise. · Do not smoke. Smoking affects blood flow and can make foot problems worse. If you need help quitting, talk to your doctor about stop-smoking programs and medicines. These can increase your chances of quitting for good. · Do not go barefoot. Protect your feet by wearing shoes that fit well. Choose shoes that are made of materials that are flexible and breathable, such as leather or cloth. · Inspect your feet daily for blisters, cuts, cracks, or sores. If you can't see well, use a mirror or have someone help you. · Have your doctor check your feet during each visit. If you have a foot problem, see your doctor. Do not try to treat your foot problem on your own.  Home remedies or treatments that you can buy without a prescription (such as corn removers) can be harmful. When should you call for help? Call your doctor now or seek immediate medical care if:    · You have symptoms of infection, such as:  ? Increased pain, swelling, warmth, or redness. ? Red streaks leading from the area. ? Pus draining from the area. ? A fever.    Watch closely for changes in your health, and be sure to contact your doctor if:    · You have a new problem with your feet, such as:  ? A new sore or ulcer. ? A break in the skin that is not healing after several days. ? Bleeding corns or calluses. ? An ingrown toenail.     · You do not get better as expected. Where can you learn more? Go to http://denae-yanni.info/. Enter T131 in the search box to learn more about \"Diabetic Foot Ulcer: Care Instructions. \"  Current as of: July 25, 2018  Content Version: 12.1  © 7003-3739 [employer]. Care instructions adapted under license by [employer] (which disclaims liability or warranty for this information). If you have questions about a medical condition or this instruction, always ask your healthcare professional. [place] [address] any warranty or liability for your use of this information.

## 2019-09-04 NOTE — DISCHARGE SUMMARY
Discharge Summary     Patient: Karla Piasno MRN: [id number]  SSN: [id number]    YOB: 1965  Age: 49 y.o. Sex: male       Admit Date: 8/23/2019    Discharge Date: 9/4/2019      Admission Diagnoses: Cellulitis of right leg [L03.115]    Discharge Diagnoses:   Problem List as of 9/4/2019 Date Reviewed: 8/24/2019          Codes Class Noted - Resolved    * (Principal) Diabetic infection of right foot ([facility] ICD-10-CM: E11.628, L08.9  ICD-9-CM: 250.80, 686.9  8/24/2019 - Present        Sepsis ([facility] ICD-10-CM: A41.9  ICD-9-CM: 038.9, 995.91  8/24/2019 - Present        Cellulitis of right leg ICD-10-CM: L03.115  ICD-9-CM: 682.6  8/23/2019 - Present        Foreign body (FB) in soft tissue ICD-10-CM: M79.5  ICD-9-CM: 729.6  8/23/2019 - Present        Subcutaneous emphysema ([facility] ICD-10-CM: T79. 7XXA  ICD-9-CM: 958.7  8/23/2019 - Present        Uncontrolled type II diabetes mellitus ([facility] ICD-10-CM: E11.65  ICD-9-CM: 250.02  8/23/2019 - Present        Personal history of noncompliance with medical treatment ICD-10-CM: Z91.19  ICD-9-CM: V15.81  8/23/2019 - Present        Hyponatremia ICD-10-CM: E87.1  ICD-9-CM: 276.1  8/23/2019 - Present               Discharge Condition: Stable    Hospital Course: Patient is [phone] old male with history of NDDM and non complicance presents with redness, swelling, pain of R foot and low grade fever for last 5 days. He do not recall injuries but he walks bare foot not infrequently. He has no PCP, on no medications for years despite his need for low dose insulin 2-3 y ago. Surgical debridement and removal of glass foreign body, and R 5th toe amputation 8/25. Hyperkalemic on 9/3. Resolved on repeat check. Extensive discussion with patient about treatment adherence moving forward, need for health insurance and need for regular follow up with new PCP. Patient is not able to be discharged to rehab facility given lack of funding.  Discharged home with home health and home infusion services to complete IV abx course. Patient to have walker and wheelchair and bedside commode at home to maximize success. Patient gifted all his prescriptions and has them in hand at time of discharge. Patient to follow up with new PCP and general surgery. Consults: General Surgery and Infectious Disease    Significant Diagnostic Studies:     MRI right foot with and without contrast: 1. Large soft tissue defect with postsurgical change as described. 2. No evidence of abscess or osteomyelitis. 3. Plantar muscular edema. Findings compatible with chronic denervation change. Disposition: home with home health    Discharge Medications:   Current Discharge Medication List      START taking these medications    Details   cefTRIAXone 2 gram 2 g, ADDaptor 1 Device IVPB 2 g by IntraVENous route every twenty-four (24) hours for 4 days. Qty: 4 Dose, Refills: 0      amLODIPine (NORVASC) 5 mg tablet Take 1 Tab by mouth daily. Qty: 30 Tab, Refills: 1      insulin glargine (LANTUS) 100 unit/mL injection 10 units subcutaneous daily at night  Indications: type 2 diabetes mellitus  Qty: 1 Vial, Refills: 1      lisinopril (PRINIVIL, ZESTRIL) 40 mg tablet Take 1 Tab by mouth daily. Qty: 30 Tab, Refills: 1             Activity: TOTAL NON WEIGHT BEARING  Diet: Diabetic Diet  Wound Care: As directed    Follow-up Appointments   Procedures    FOLLOW UP VISIT Appointment in: One Week With PCP     With PCP     Standing Status:   Standing     Number of Occurrences:   1     Order Specific Question:   Appointment in     Answer:    One Week       Signed By: Saul Burgos MD     September 4, 2019

## 2019-09-04 NOTE — PROGRESS NOTES
Shift assessment done. Pt seen sleeping but rousable. AAO x4. Respiration seven and unlabored. HR regular. Abdomen soft. R foot with dressing c/d/i. Pt denies needs and pain this time. Instructed to call for assistance when needed. Call light in reach. Safety measures provided. Will continue to monitor.

## 2019-09-04 NOTE — PROGRESS NOTES
Hospitalist Progress Note    9/3/2019  Admit Date: 2019  6:07 PM   NAME: Gerson Wills   :  1965   MRN:  [id number]   Attending: Sera Thomas DO  PCP:  None    SUBJECTIVE:     Plan for discharge home with home infusion services once stable per CM. Patient has no complaints. AMADOR. Review of Systems negative with exception of pertinent positives noted above  PHYSICAL EXAM     Visit Vitals  /84 (BP 1 Location: Right arm, BP Patient Position: At rest;Head of bed elevated (Comment degrees))   Pulse 68   Temp 98.4 °F (36.9 °C)   Resp 18   Ht 5' 9\" (1.753 m)   Wt 95.3 kg (210 lb)   SpO2 92%   BMI 31.01 kg/m²      Temp (24hrs), Av.2 °F (36.8 °C), Min:97.8 °F (36.6 °C), Max:98.6 °F (37 °C)    Oxygen Therapy  O2 Sat (%): 92 % (19)  O2 Device: Room air (19 08)  O2 Flow Rate (L/min): 2 l/min (19 0930)    Intake/Output Summary (Last 24 hours) at 9/3/2019 2109  Last data filed at 9/3/2019 1619  Gross per 24 hour   Intake --   Output 1850 ml   Net -1850 ml      General: No acute distress    Lungs:  CTA Bilaterally. Heart:  Regular rate and rhythm,  No murmur, rub, or gallop  Abdomen: Soft, Non distended, Non tender, Positive bowel sounds  Extremities: Bandaging in place c/d/i  Neurologic:  No focal deficits    ASSESSMENT      Patient is a 49years old male with history of NDDM and non complicance presents with redness, swelling, pain of R foot and low grade fever for last 5 days. He do not recall injuries but he walks bare foot not infrequently. He has no PCP, on no medications for years despite his need for low dose insulin 2-3 y ago. Surgical debridement and removal of glass foreign body, and R 5th toe amputation . Hyperkalemic on 9/3. Resolved on repeat check. Extensive discussion with patient about treatment adherence moving forward, need for health insurance and need for regular follow up with new PCP.     Plan:    # Sepsis, R diabetic foot infection, strep anginosus bacteremia   - Surgery and ID following. Continue rocephin, EOT 9/8.  - CM following, may be able to do SNF for abx and NWB foot, working on dispo. If will not be able to, has plan in place for home infusions and HH.    - Strep anginosus bacteremia, intraop wound cx alpha strep and staph aureus, pansensitive. Rocephin EOT 9/8  - strict NWB per surgery 1-2 months. Ame signed off, f/u 2w after discharge at wound center. Daily dressing changes.     # Paroxysmal atrial fibrillation  - observed during surgery and likely precipitated by sepsis  - patient spontaneously converted back to NSR  - no current indication for anticoagulation  - telemetry  - sepsis management, as above    # DM type II, uncontrolled  - cannot afford any medications and does not take anything at home   - Lantus 10 units qdaily  - Humalog SSI and serial CBGs  - goal CBG between 140 and 180 while inpatient  - diabetes education consultation  - HgbA1c of 9.7    # HTN: started and adjusting norvasc, aldactazide, lisinopril    F/E/N: off fluids, replete electrolytes as needed, diabetic diet    Ppx: Lovenox for VTE    Code Status: FULL CODE    Disposition: Home tomorrow    Signed By: Saturnino Peña MD     September 3, 2019

## 2019-09-04 NOTE — PROGRESS NOTES
Per Dr. Dedra Mcguire and  Felix Sommer, pt is ready for discharge. Patient was given d/c instructions, prescriptions, and follow up appt. With Dr. Elysia Mcguire. Also instructed pt to f/u at [facility] clinic. His info was sent by the . Pt going home with PICC line to right arm. Per , home health care services and [facility] home infusions have been set up and pt has been educated on these. Pt expressing concerns over cost of medications (has rxs for Amlodipine, Lisinopril, and Lantus). Concerns voiced to  who states she will get vouchers for the patient and have the meds sent to the hospital via  before the pt is picked up via ambulance at 1900. Pt notified. Also expressed concerns over lack of diabetes supplies. Pt given glucometer kit out of diabetic educator supplies. Also given syringes from  supplies. Pt states that the diabetic educator taught him how to draw/give insulin and that he has printed out information on this as well. Pt refused to sign on e-sign. He wanted the AVS printed out and signed on that. Signed copy of AVS placed in pt's paper chart. Pt denied any questions regarding nursing part of discharge instructions.  to follow up with patient about medications and has arranged  for 1900 tonight.

## 2019-09-04 NOTE — PROGRESS NOTES
Assessment done via doc flow sheet. Pt resting in bed, alert & oriented x4, resp easy & regular, lung sound CTAB. Right foot with dressing clean, dry & intact, sensation present, pt denies pain at this time. Call light within reach, pt instructed to call for assistance as needed.

## 2019-09-04 NOTE — PROGRESS NOTES
Problem: Diabetes Self-Management  Goal: *Disease process and treatment process  Description  Define diabetes and identify own type of diabetes; list 3 options for treating diabetes. Outcome: Progressing Towards Goal  Goal: *Incorporating nutritional management into lifestyle  Description  Describe effect of type, amount and timing of food on blood glucose; list 3 methods for planning meals. Outcome: Progressing Towards Goal  Goal: *Incorporating physical activity into lifestyle  Description  State effect of exercise on blood glucose levels. Outcome: Progressing Towards Goal  Goal: *Using medications safely  Description  State effect of diabetes medications on diabetes; name diabetes medication taking, action and side effects. Outcome: Progressing Towards Goal  Goal: *Monitoring blood glucose, interpreting and using results  Description  Identify recommended blood glucose targets  and personal targets. Outcome: Progressing Towards Goal     Problem: Falls - Risk of  Goal: *Absence of Falls  Description  Document [place] Fall Risk and appropriate interventions in the flowsheet. Outcome: Progressing Towards Goal  Note:   Fall Risk Interventions:  Mobility Interventions: Patient to call before getting OOB         Medication Interventions: Patient to call before getting OOB    Elimination Interventions: Call light in reach, Patient to call for help with toileting needs, Stay With Me (per policy)              Problem: Patient Education: Go to Patient Education Activity  Goal: Patient/Family Education  Outcome: Progressing Towards Goal     Problem: Pressure Injury - Risk of  Goal: *Prevention of pressure injury  Description  Document Gui Scale and appropriate interventions in the flowsheet.   Outcome: Progressing Towards Goal  Note:   Pressure Injury Interventions:  Sensory Interventions: Float heels, Keep linens dry and wrinkle-free, Pressure redistribution bed/mattress (bed type), Minimize linen layers Activity Interventions: PT/OT evaluation    Mobility Interventions: Float heels, HOB 30 degrees or less, Pressure redistribution bed/mattress (bed type), PT/OT evaluation    Nutrition Interventions: Document food/fluid/supplement intake

## 2019-09-04 NOTE — PROGRESS NOTES
Problem: Falls - Risk of  Goal: *Absence of Falls  Description  Document Jessa Payment Fall Risk and appropriate interventions in the flowsheet. Outcome: Progressing Towards Goal  Note:   Fall Risk Interventions:  Mobility Interventions: Patient to call before getting OOB         Medication Interventions: Patient to call before getting OOB    Elimination Interventions: Call light in reach, Patient to call for help with toileting needs              Problem: Patient Education: Go to Patient Education Activity  Goal: Patient/Family Education  Outcome: Progressing Towards Goal     Problem: Nutrition Deficit  Goal: *Optimize nutritional status  Outcome: Progressing Towards Goal     Problem: Pressure Injury - Risk of  Goal: *Prevention of pressure injury  Description  Document Gui Scale and appropriate interventions in the flowsheet.   Outcome: Progressing Towards Goal  Note:   Pressure Injury Interventions:  Sensory Interventions: Assess changes in LOC, Float heels         Activity Interventions: PT/OT evaluation    Mobility Interventions: Float heels, HOB 30 degrees or less, Pressure redistribution bed/mattress (bed type), PT/OT evaluation    Nutrition Interventions: Document food/fluid/supplement intake

## 2019-09-04 NOTE — PROGRESS NOTES
Visited with pt regarding plans for discharge, pt is refusing [facility] + to educate/training on IV home infusions. I looked into DME and it will cost $60/BSC and $100/moth for a wheelchair. This is not feasible for a self pay pt. I have discussed possibly keeping the pt until 9/8 when IV ABX will be finished. Pt will be able to ambulate at home with more ease at that point. MD meet with pt, he is saying he wants to \"go home\" \"we are responsible for his foot\" and then says he is willing/able to administer the IV ABX. Pt is very back and forth during discussions. Per MD pt is medically stable and ready to d/c home, I have located a free front wheeled walker and BSC from [place] [id number] for free as well. D/cC RN gave insulin needle and glucometer, I have a  to go to [facility] to  pt's Lisinopril/Lantus and Norvasc. He is also aware that he can then get his insulin and supplies at [employer], the [employer]. I went over the [facility] application with pt and stressed the importance of him following through with this b/c he can receive free medications for a yr. [facility]/SF services aware. [employer] will be able to delivery food on Monday 9/9 at the latest. Pt states he has enough food in the house to get by until then. States his sister will be in town tomorrow, if he needs anything(but does not want to bother her). Pt did receive IV ABX teaching yesterday and will receive more information once he arrives at home. [facility] is scheduled to pick pt up @ 1700, pt needs to receive his 1600 dose of ABX.

## 2019-09-05 ENCOUNTER — HOME CARE VISIT (OUTPATIENT)
Dept: HOME HEALTH SERVICES | Facility: HOME HEALTH | Age: 54
End: 2019-09-05

## 2019-09-05 ENCOUNTER — HOSPITAL ENCOUNTER (EMERGENCY)
Age: 54
Discharge: LEFT BEFORE DISCHARGE | End: 2019-09-05
Attending: EMERGENCY MEDICINE
Payer: SELF-PAY

## 2019-09-05 PROCEDURE — 75810000275 HC EMERGENCY DEPT VISIT NO LEVEL OF CARE: Performed by: EMERGENCY MEDICINE

## 2019-09-05 NOTE — ED NOTES
General surgery called back and states that they are not aware of what to do for this patient. Dr. Jennie Carey is not available.

## 2019-09-05 NOTE — ED NOTES
I spoke with patient in the lobby because there was some confusion on why he was here today. Patient recently had a toe removed by Dr. Lorene Mae. He states that his home health nurse called EMS and stated that he needed to come here to the ER for evaluation. Patient is refusing to be seen unless he is promised a ride home. He states that the home health nurse, MARY Desai, told him that she \"promised\" we would give him a cab ride back. Patient lives in [place], which is out of outside of our policy. Patient gave me the number to home health. I spoke with the supervisor on call, Irene Patrick. She states that the patient was only seen today for an admission evaluation for home health. The patient requires higher level of care than the home health service offers. I asked if she was able to call the nurse back that sent him here, so that she could provide a ride for the patient. She states that it is against their policy to transport patients. She states their is nothing they can do to help this patient at this time. Dr. Lorene Mae was paged, and we are waiting for a returned call.

## 2019-09-05 NOTE — ED NOTES
I have continued to ask the patient if he wants to be seen and treated. He continues to say no, but demands that we give him a ride. I repeatedly explained that we can not give him a ride. He motions to his phone and states that he might have a ride home already. I told him that if he was not willing to let us see him today, he should take the ride he has arranged, and go back home. He then starts demanding that we provide him one again. I left and got the charge nurse to handle the situation from here.

## 2019-09-05 NOTE — Clinical Note
9/5/19 at [phone] :  received a message from the after-hours answering service with a callback request from [facility] ED nurse, regarding patient Adrienne Ramesh. SN placed call to [phone] at [phone]. SN spoke with Vishal, who stated that the patient was sitting in the waiting room, refusing to be seen without being promised a ride home.   ED nurse requesting that [place] contact the clinician who evaluated  the patient in his home and referred the patient back to the ED for a higher level of care. ED nurse requesting that this clinician, Sb Matute, transport the patient from the ED back to his home as he was refusing to be seen. SN explained to the ED nurse that it is against company policy for employees to transport patients. SN requested to speak to the patient, requesting that the ED nurse call back on a phone within reach of the patient. ED nurse called SN at  1756 and SN was able to speak directly to the patient at this time. During a five-minute conversation with the patient, SN encouraged the patient to allow the ED to assess him and care for him. Patient noted as having slurred, aggressive speech, verbalizing understanding that his doctor, Dr. Lorene Mae, had wanted him to be seen in the ED for further care. Patient passed the phone back to the ED nurse. SN asked the ED nurse if the patient was under the influence of any substances. ED nurse replied, no, I think he is just a little slow.   SN reinforced that [address] [place] [address] Bry Rahul Diamond would be unable to assist further as the patient was not admitted to service due to a need for a higher level of care. ED nurse verbalized understanding.

## 2019-09-05 NOTE — ED NOTES
Patient continues to refuse to be seen fussing again at this RN that he was promised a ride home, I again advised patient that we did not promise a ride and I cannot provide a ride to [place]. Patient advised that we would be more than happy to see him however I cannot promise he would be admitted, discharged, seen by General surgery but he would be examined by a ER physician and patient care would be determined for result findings. Patient advises this hospital is not worst a shit and I advised that this hospital would be willing to take care of patient however he would have to agree for treatment. Patient refuses, I told him I was going to discharge him from screen at this time if he agreed to be seen he needs to go back to registration window and sign in.

## 2019-09-06 NOTE — PROGRESS NOTES
SW called patient as RN CM was unable to reach him during her shift. Patient states that he had a friend pick him up last night from the ER. When the SW asked why he left without being seen, patient states that he \"didn't know why he was there\" and he was worried about his cats. Patient states that his [facility] RN told him he could get a ride back to his home, and he was worried that would not happen so he wanted to go. Patient was fixated on his ride, which he was able to obtain, but could not say why he declined to be seen once arriving to the ED reportedly. Patient states that he has 15 cats which are \"indoor and outdoor\" that he adopted as strays. When the patient was admitted, a neighbor agreed to feed the cats, but did not keep the litter boxes clean and the cats began to defecate in his house. The patient has been unable to clean this up, which is what he attributes to the state of his home as reported by the [facility] RN. Patient states that his sister has offered to help him clean up, but he's told her not to. The patient also states that his old roommate is wanting to move back in with him, but he has not allowed her to yet, stating that she \"needs to help clean. \" SW asked the patient why he would not allow his sister to help him clean. Patient states \"I would never hear the end of it. \" SW advised the patient that his home was not in suitable condition reportedly and he needs to allow his sister to help him, and then keep the cats outside until he's able to properly care for them. Patient states that an APS worker came by his home this afternoon Allyssa Rivera) and told him that he had thirty days to get his home clean or he would take him into custody. SW urged the patient to return to the ER to be seen but he declined stating \"that hospital is weird, I was there for over a week and I don't need to come back. \"  Patient states that he had PO antibiotics ordered and his friend is picking them up for him tonight.  supervisor briefed on situation. Patient asked three times to return to the ER and he declined each time.      Lion Ludwig, [facility]    [address] [facility]  [email or web address]

## 2019-09-06 NOTE — PROGRESS NOTES
Received a phone form Dr. Sean Andrew regarding pt's d/c home. He explains that he has been receiving phone calls form the pt's sister/Mariela about what was going on with her brother. I asked the pt multiple times for his sisters # and he refused to provide that information to me, stating that \"I don't want any help form her. \"  Apparently, HH RN went to pt's house on 9/5 and found \"porch,hallways, kitchen, dining  room table all covered with cat feces & urine and home with foul odor, old food, unsafe environment. Cats laying on porch, table, in bed and on the patients lap. \" She notified Dr. Richmond [place] office of the unsanitary conditions and advised that pt go back to the ED. PT gave RN push back b/c keep repeating the fact that he needed someone to care for his cats, or he was not going. @1749 on 9/5/19. Pt arrived to ED, refused to be seen, stated that a RN promised he could get a ride home. Fixated about the well-being of his cats. 1405 on 9/6 Ame called stating that the pt should not have been d/c, that he(Dr. Sean Andrew) clearly wrote in his note that pt was non-compliant and needed to go to [facility]. I expressed concern and assured Dr. Sean Andrew that CM/SW did everything in our power to make that happen for the pt but he unfortunate does not have a payor source. We tried to expedite the Medicaid process, but that was out of our hands. I personally [facility] up [facility], [facility] infusions, [employer], explained the [facility] Program(free medications for 1yr), provided application and the \"no income form\", had a  bring his Rx form [address] to the pt, gave him a walker, BSC, insulin needles and a glucometer, referred pt to [facility] (Reim Will has reached out to pt and will see him next week, per her e-mail to me). Also provided EMS transportation home. I received approval form upper management that pt would be under [facility] for these services that I just listed.   Dixie is requesting that I have the Hospitalist's  re-admit this pt, he clearly cannot stay at his residence and we need to get home into a nursing facility. I have discussed this with my supervisor Cathy Mejias. KELLY SANABRIA. Also with [facility] Liaison, that there Directors are also involved in pt's care. Will discuss concerns with Hospitalist's and attempt to reach out to pt and see if he would be willing to come back to the hospital for care, because his living conditions are apparently unsanitary.

## 2019-09-07 ENCOUNTER — HOSPITAL ENCOUNTER (EMERGENCY)
Age: 54
Discharge: HOME | End: 2019-09-07
Attending: EMERGENCY MEDICINE

## 2019-09-07 VITALS
DIASTOLIC BLOOD PRESSURE: 78 MMHG | BODY MASS INDEX: 31.1 KG/M2 | WEIGHT: 210 LBS | SYSTOLIC BLOOD PRESSURE: 129 MMHG | HEART RATE: 71 BPM | HEIGHT: 69 IN | OXYGEN SATURATION: 97 % | TEMPERATURE: 98.5 F

## 2019-09-07 DIAGNOSIS — Z95.828 STATUS POST PICC CENTRAL LINE PLACEMENT: Primary | ICD-10-CM

## 2019-09-07 DIAGNOSIS — Z51.89 VISIT FOR WOUND CHECK: ICD-10-CM

## 2019-09-07 LAB
ALBUMIN SERPL-MCNC: 3.6 G/DL (ref 3.5–5)
ALBUMIN/GLOB SERPL: 0.7 {RATIO} (ref 1.2–3.5)
ALP SERPL-CCNC: 138 U/L (ref 50–136)
ALT SERPL-CCNC: 35 U/L (ref 12–65)
ANION GAP SERPL CALC-SCNC: 3 MMOL/L (ref 7–16)
AST SERPL-CCNC: 58 U/L (ref 15–37)
BASOPHILS # BLD: 0.1 K/UL (ref 0–0.2)
BASOPHILS NFR BLD: 1 % (ref 0–2)
BILIRUB SERPL-MCNC: 1.1 MG/DL (ref 0.2–1.1)
BUN SERPL-MCNC: 29 MG/DL (ref 6–23)
CALCIUM SERPL-MCNC: 8.9 MG/DL (ref 8.3–10.4)
CHLORIDE SERPL-SCNC: 92 MMOL/L (ref 98–107)
CO2 SERPL-SCNC: 30 MMOL/L (ref 21–32)
CREAT SERPL-MCNC: 1.57 MG/DL (ref 0.8–1.5)
DIFFERENTIAL METHOD BLD: ABNORMAL
EOSINOPHIL # BLD: 0.3 K/UL (ref 0–0.8)
EOSINOPHIL NFR BLD: 2 % (ref 0.5–7.8)
ERYTHROCYTE [DISTWIDTH] IN BLOOD BY AUTOMATED COUNT: 13.1 % (ref 11.9–14.6)
GLOBULIN SER CALC-MCNC: 5 G/DL (ref 2.3–3.5)
GLUCOSE SERPL-MCNC: 208 MG/DL (ref 65–100)
HCT VFR BLD AUTO: 38.4 % (ref 41.1–50.3)
HGB BLD-MCNC: 12.8 G/DL (ref 13.6–17.2)
IMM GRANULOCYTES # BLD AUTO: 0.1 K/UL (ref 0–0.5)
IMM GRANULOCYTES NFR BLD AUTO: 1 % (ref 0–5)
LYMPHOCYTES # BLD: 1.9 K/UL (ref 0.5–4.6)
LYMPHOCYTES NFR BLD: 15 % (ref 13–44)
MCH RBC QN AUTO: 28.7 PG (ref 26.1–32.9)
MCHC RBC AUTO-ENTMCNC: 33.3 G/DL (ref 31.4–35)
MCV RBC AUTO: 86.1 FL (ref 79.6–97.8)
MONOCYTES # BLD: 0.8 K/UL (ref 0.1–1.3)
MONOCYTES NFR BLD: 6 % (ref 4–12)
NEUTS SEG # BLD: 9.7 K/UL (ref 1.7–8.2)
NEUTS SEG NFR BLD: 76 % (ref 43–78)
NRBC # BLD: 0 K/UL (ref 0–0.2)
PLATELET # BLD AUTO: 296 K/UL (ref 150–450)
PMV BLD AUTO: 10.9 FL (ref 9.4–12.3)
POTASSIUM SERPL-SCNC: 5.1 MMOL/L (ref 3.5–5.1)
PROT SERPL-MCNC: 8.6 G/DL (ref 6.3–8.2)
RBC # BLD AUTO: 4.46 M/UL (ref 4.23–5.6)
SODIUM SERPL-SCNC: 125 MMOL/L (ref 136–145)
WBC # BLD AUTO: 12.9 K/UL (ref 4.3–11.1)

## 2019-09-07 PROCEDURE — 99283 EMERGENCY DEPT VISIT LOW MDM: CPT | Performed by: EMERGENCY MEDICINE

## 2019-09-07 PROCEDURE — 85025 COMPLETE CBC W/AUTO DIFF WBC: CPT

## 2019-09-07 PROCEDURE — 80053 COMPREHEN METABOLIC PANEL: CPT

## 2019-09-08 NOTE — PROGRESS NOTES
CUAUHTEMOC spoke with Dr. Rob Campa regarding concerns that the patient was not admitted last night. CUAUHTEMOC submitted an FYI should the patient present to the hospital Dr. Rob Campa wishes to be notified so he can admit.      Sabine Pena, [facility]    [address] [email or web address]
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
## 2019-09-08 NOTE — ED TRIAGE NOTES
Pt coming in by [facility] for his picc line coming out. Denies bleeding. Patient also needs his food bandage. Patient reports to EMS it was supposed to come out by Monday. States he had the PICC line for antibiotics. States he had pinky toe removed on the 27th. Denies fevers. Denies nausea or vomiting.  Denies any new swelling or pain in foot

## 2019-09-08 NOTE — ED NOTES
I have reviewed discharge instructions with the patient. The patient verbalized understanding. Patient left ED via Discharge Method: ambulatory to Home with self via taxi cab . Opportunity for questions and clarification provided. Patient given 0 scripts. Pt in no acute distress at time of d/c        To continue your aftercare when you leave the hospital, you may receive an automated call from our care team to check in on how you are doing. This is a free service and part of our promise to provide the best care and service to meet your aftercare needs.  If you have questions, or wish to unsubscribe from this service please call [phone]. Thank you for Choosing our [facility] Emergency Department.

## 2019-09-08 NOTE — DISCHARGE INSTRUCTIONS
Call Dr. Stevie Cogan to see about getting seen sooner than the 19th  Continue the antibiotics which were prescribed to the other day  Return to the ER if worse in any way  Pursue your scheduled dressing changes with the home health nurse, when they are supposed to come out      Patient Education        Learning About What to Expect at Home After Surgery  What do you need to know when you leave the hospital after surgery? Each person recovers from surgery at a different pace. Your discharge plan will help you leave the hospital safely. It will outline the care you need. And it will give you information about the things you'll need to do at home. Make sure you get your plan in writing. Look for information on:  · What your medicines are and how to take them. · When you need to see the doctor again or get any follow-up tests. · How and when to change bandages and dressings. · How active you can be. This may include physical therapy. · How to prevent falls. · What you can eat and can't eat. What do you need to know about taking medicines? Your doctor will talk with you about restarting your medicines. He or she will also tell you about taking any new medicines. · If you take aspirin or some other blood thinner, be sure to talk to your doctor. He or she will tell you if and when to start taking those medicines again. · If your doctor gave you a prescription medicine for pain, take it as prescribed. · If you aren't taking a prescription pain medicine, ask your doctor if you can take an over-the-counter medicine. How can you take care of your incision? If you have a cut (incision), follow your doctor's instructions to care for it. If you did not get instructions, follow this general advice:  · You will have a dressing over the cut. A dressing helps the incision heal and protects it. ? Change the bandage every day.   ? If you have strips of tape on the cut, leave them on for a week or until they fall off.  ? If you have stitches or staples, your doctor will tell you when to come back to have them removed. ? If you have skin adhesive (liquid stitches) on the cut, leave it on until it falls off. · Wash the area daily with warm water, and pat it dry. Don't use hydrogen peroxide or alcohol. · You may shower 24 to 48 hours after surgery. Pat the incision dry. Don't swim or take a bath for the first 2 weeks, or until your doctor tells you it's okay. When can you be active again? One of the most important things you can do for yourself after surgery is to find ways to move. When you move as much as possible, even in bed, you are helping your body heal.  Here are some tips:  · Don't move quickly or lift anything heavy until you feel better. · Taking short walks is a good way to help your body heal.  · Rest when you feel tired. Your doctor may give you instructions on when you can do your normal activities again, such as driving and going back to work. What do you need to know about eating? If your doctor told you when you can start eating and what foods you can eat, follow your doctor's instructions. If you did not get instructions, follow this general advice:  · You can eat your normal diet when you feel well. If your stomach is upset, try bland, low-fat foods. These include plain rice, broiled chicken, toast, and yogurt. · If your bowel movements aren't regular right after surgery, try to avoid constipation and straining. Drink plenty of water. Your doctor may suggest fiber, a stool softener, or a mild laxative. What do you do if you have infection or pain? If you have signs of infection, call your doctor. These signs include:  · Increased pain, swelling, warmth, or redness. · Red streaks leading from the incision. · Pus draining from the incision. · A fever. Also call your doctor if you have pain that does not get better after you take pain medicine. Follow-up care is a key part of your treatment and safety.  Be sure to make and go to all appointments, and call your doctor if you are having problems. It's also a good idea to know your test results and keep a list of the medicines you take. Where can you learn more? Go to http://denae-yanni.info/. Leslie Beckman in the search box to learn more about \"Learning About What to Expect at Home After Surgery. \"  Current as of: December 13, 2018  Content Version: 12.1  © 4890-2106 [employer]. Care instructions adapted under license by [employer] (which disclaims liability or warranty for this information). If you have questions about a medical condition or this instruction, always ask your healthcare professional. [address] any warranty or liability for your use of this information.

## 2019-09-09 ENCOUNTER — APPOINTMENT (OUTPATIENT)
Dept: GENERAL RADIOLOGY | Age: 54
DRG: 624 | End: 2019-09-09
Attending: EMERGENCY MEDICINE

## 2019-09-09 ENCOUNTER — HOSPITAL ENCOUNTER (INPATIENT)
Age: 54
LOS: 8 days | Discharge: NURSING, REHAB OR LONG-TERM CARE | DRG: 624 | End: 2019-09-20
Attending: EMERGENCY MEDICINE | Admitting: HOSPITALIST

## 2019-09-09 DIAGNOSIS — E11.8 DIABETIC FOOT (HCC): Primary | ICD-10-CM

## 2019-09-09 DIAGNOSIS — Z65.9 POOR SOCIAL SITUATION: ICD-10-CM

## 2019-09-09 PROBLEM — S91.301A OPEN WOUND OF RIGHT FOOT: Status: ACTIVE | Noted: 2019-09-09

## 2019-09-09 PROBLEM — D72.829 LEUKOCYTOSIS: Status: ACTIVE | Noted: 2019-09-09

## 2019-09-09 PROBLEM — L97.513 DIABETIC ULCER OF TOE OF RIGHT FOOT ASSOCIATED WITH TYPE 2 DIABETES MELLITUS, WITH NECROSIS OF MUSCLE (HCC): Status: ACTIVE | Noted: 2019-08-24

## 2019-09-09 PROBLEM — L08.9 DIABETIC FOOT INFECTION (HCC): Status: ACTIVE | Noted: 2019-09-09

## 2019-09-09 PROBLEM — Z91.199 MEDICALLY NONCOMPLIANT: Status: ACTIVE | Noted: 2019-09-09

## 2019-09-09 PROBLEM — E11.628 DIABETIC FOOT INFECTION (HCC): Status: ACTIVE | Noted: 2019-09-09

## 2019-09-09 PROBLEM — E11.621 DIABETIC ULCER OF TOE OF RIGHT FOOT ASSOCIATED WITH TYPE 2 DIABETES MELLITUS, WITH NECROSIS OF MUSCLE (HCC): Status: ACTIVE | Noted: 2019-08-24

## 2019-09-09 LAB
ALBUMIN SERPL-MCNC: 3.6 G/DL (ref 3.5–5)
ALBUMIN/GLOB SERPL: 0.8 {RATIO} (ref 1.2–3.5)
ALP SERPL-CCNC: 112 U/L (ref 50–136)
ALT SERPL-CCNC: 31 U/L (ref 12–65)
ANION GAP SERPL CALC-SCNC: 8 MMOL/L (ref 7–16)
AST SERPL-CCNC: 25 U/L (ref 15–37)
BASOPHILS # BLD: 0.1 K/UL (ref 0–0.2)
BASOPHILS NFR BLD: 1 % (ref 0–2)
BILIRUB SERPL-MCNC: 0.7 MG/DL (ref 0.2–1.1)
BUN SERPL-MCNC: 26 MG/DL (ref 6–23)
CALCIUM SERPL-MCNC: 9.1 MG/DL (ref 8.3–10.4)
CHLORIDE SERPL-SCNC: 99 MMOL/L (ref 98–107)
CO2 SERPL-SCNC: 27 MMOL/L (ref 21–32)
CREAT SERPL-MCNC: 1.52 MG/DL (ref 0.8–1.5)
DIFFERENTIAL METHOD BLD: ABNORMAL
EOSINOPHIL # BLD: 0.3 K/UL (ref 0–0.8)
EOSINOPHIL NFR BLD: 2 % (ref 0.5–7.8)
ERYTHROCYTE [DISTWIDTH] IN BLOOD BY AUTOMATED COUNT: 13.2 % (ref 11.9–14.6)
EST. AVERAGE GLUCOSE BLD GHB EST-MCNC: 189 MG/DL
GLOBULIN SER CALC-MCNC: 4.6 G/DL (ref 2.3–3.5)
GLUCOSE BLD STRIP.AUTO-MCNC: 183 MG/DL (ref 65–100)
GLUCOSE BLD STRIP.AUTO-MCNC: 210 MG/DL (ref 65–100)
GLUCOSE SERPL-MCNC: 187 MG/DL (ref 65–100)
HBA1C MFR BLD: 8.2 %
HCT VFR BLD AUTO: 39.1 % (ref 41.1–50.3)
HGB BLD-MCNC: 12.9 G/DL (ref 13.6–17.2)
IMM GRANULOCYTES # BLD AUTO: 0.1 K/UL (ref 0–0.5)
IMM GRANULOCYTES NFR BLD AUTO: 1 % (ref 0–5)
LYMPHOCYTES # BLD: 2.3 K/UL (ref 0.5–4.6)
LYMPHOCYTES NFR BLD: 21 % (ref 13–44)
MCH RBC QN AUTO: 28.2 PG (ref 26.1–32.9)
MCHC RBC AUTO-ENTMCNC: 33 G/DL (ref 31.4–35)
MCV RBC AUTO: 85.4 FL (ref 79.6–97.8)
MONOCYTES # BLD: 0.8 K/UL (ref 0.1–1.3)
MONOCYTES NFR BLD: 7 % (ref 4–12)
NEUTS SEG # BLD: 7.6 K/UL (ref 1.7–8.2)
NEUTS SEG NFR BLD: 68 % (ref 43–78)
NRBC # BLD: 0 K/UL (ref 0–0.2)
PLATELET # BLD AUTO: 309 K/UL (ref 150–450)
PMV BLD AUTO: 10.5 FL (ref 9.4–12.3)
POTASSIUM SERPL-SCNC: 4.9 MMOL/L (ref 3.5–5.1)
PROT SERPL-MCNC: 8.2 G/DL (ref 6.3–8.2)
RBC # BLD AUTO: 4.58 M/UL (ref 4.23–5.6)
SODIUM SERPL-SCNC: 134 MMOL/L (ref 136–145)
WBC # BLD AUTO: 11.2 K/UL (ref 4.3–11.1)

## 2019-09-09 PROCEDURE — 73630 X-RAY EXAM OF FOOT: CPT

## 2019-09-09 PROCEDURE — 74011000258 HC RX REV CODE- 258: Performed by: HOSPITALIST

## 2019-09-09 PROCEDURE — 96360 HYDRATION IV INFUSION INIT: CPT | Performed by: EMERGENCY MEDICINE

## 2019-09-09 PROCEDURE — 83036 HEMOGLOBIN GLYCOSYLATED A1C: CPT

## 2019-09-09 PROCEDURE — 86580 TB INTRADERMAL TEST: CPT | Performed by: HOSPITALIST

## 2019-09-09 PROCEDURE — 80053 COMPREHEN METABOLIC PANEL: CPT

## 2019-09-09 PROCEDURE — 99218 HC RM OBSERVATION: CPT

## 2019-09-09 PROCEDURE — 74011250636 HC RX REV CODE- 250/636: Performed by: HOSPITALIST

## 2019-09-09 PROCEDURE — 99284 EMERGENCY DEPT VISIT MOD MDM: CPT | Performed by: EMERGENCY MEDICINE

## 2019-09-09 PROCEDURE — 96361 HYDRATE IV INFUSION ADD-ON: CPT | Performed by: EMERGENCY MEDICINE

## 2019-09-09 PROCEDURE — 74011000302 HC RX REV CODE- 302: Performed by: HOSPITALIST

## 2019-09-09 PROCEDURE — 74011250636 HC RX REV CODE- 250/636: Performed by: EMERGENCY MEDICINE

## 2019-09-09 PROCEDURE — 93005 ELECTROCARDIOGRAM TRACING: CPT | Performed by: EMERGENCY MEDICINE

## 2019-09-09 PROCEDURE — 96365 THER/PROPH/DIAG IV INF INIT: CPT

## 2019-09-09 PROCEDURE — 85025 COMPLETE CBC W/AUTO DIFF WBC: CPT

## 2019-09-09 PROCEDURE — 82962 GLUCOSE BLOOD TEST: CPT

## 2019-09-09 PROCEDURE — 74011636637 HC RX REV CODE- 636/637: Performed by: HOSPITALIST

## 2019-09-09 RX ORDER — ACETAMINOPHEN 325 MG/1
650 TABLET ORAL
Status: DISCONTINUED | OUTPATIENT
Start: 2019-09-09 | End: 2019-09-20 | Stop reason: HOSPADM

## 2019-09-09 RX ORDER — HEPARIN SODIUM 5000 [USP'U]/ML
5000 INJECTION, SOLUTION INTRAVENOUS; SUBCUTANEOUS EVERY 8 HOURS
Status: DISCONTINUED | OUTPATIENT
Start: 2019-09-10 | End: 2019-09-20 | Stop reason: HOSPADM

## 2019-09-09 RX ORDER — DEXTROSE 40 %
15 GEL (GRAM) ORAL AS NEEDED
Status: DISCONTINUED | OUTPATIENT
Start: 2019-09-09 | End: 2019-09-20 | Stop reason: HOSPADM

## 2019-09-09 RX ORDER — DEXTROSE 50 % IN WATER (D50W) INTRAVENOUS SYRINGE
25-50 AS NEEDED
Status: DISCONTINUED | OUTPATIENT
Start: 2019-09-09 | End: 2019-09-20 | Stop reason: HOSPADM

## 2019-09-09 RX ORDER — SODIUM CHLORIDE 0.9 % (FLUSH) 0.9 %
5-40 SYRINGE (ML) INJECTION AS NEEDED
Status: DISCONTINUED | OUTPATIENT
Start: 2019-09-09 | End: 2019-09-12

## 2019-09-09 RX ORDER — BISACODYL 5 MG
5 TABLET, DELAYED RELEASE (ENTERIC COATED) ORAL DAILY PRN
Status: DISCONTINUED | OUTPATIENT
Start: 2019-09-09 | End: 2019-09-20 | Stop reason: HOSPADM

## 2019-09-09 RX ORDER — SODIUM CHLORIDE 0.9 % (FLUSH) 0.9 %
5-40 SYRINGE (ML) INJECTION EVERY 8 HOURS
Status: DISCONTINUED | OUTPATIENT
Start: 2019-09-10 | End: 2019-09-12

## 2019-09-09 RX ORDER — INSULIN LISPRO 100 [IU]/ML
INJECTION, SOLUTION INTRAVENOUS; SUBCUTANEOUS
Status: DISCONTINUED | OUTPATIENT
Start: 2019-09-10 | End: 2019-09-20 | Stop reason: HOSPADM

## 2019-09-09 RX ORDER — SODIUM CHLORIDE 9 MG/ML
100 INJECTION, SOLUTION INTRAVENOUS CONTINUOUS
Status: DISCONTINUED | OUTPATIENT
Start: 2019-09-10 | End: 2019-09-14

## 2019-09-09 RX ORDER — ONDANSETRON 2 MG/ML
4 INJECTION INTRAMUSCULAR; INTRAVENOUS
Status: DISCONTINUED | OUTPATIENT
Start: 2019-09-09 | End: 2019-09-20 | Stop reason: HOSPADM

## 2019-09-09 RX ORDER — CEPHALEXIN 500 MG/1
CAPSULE ORAL
Refills: 0 | COMMUNITY
Start: 2019-09-06 | End: 2019-09-20

## 2019-09-09 RX ORDER — INSULIN GLARGINE 100 [IU]/ML
10 INJECTION, SOLUTION SUBCUTANEOUS
Status: DISCONTINUED | OUTPATIENT
Start: 2019-09-10 | End: 2019-09-11

## 2019-09-09 RX ORDER — DIPHENHYDRAMINE HCL 25 MG
25 CAPSULE ORAL
Status: DISCONTINUED | OUTPATIENT
Start: 2019-09-09 | End: 2019-09-20 | Stop reason: HOSPADM

## 2019-09-09 RX ADMIN — SODIUM CHLORIDE 1000 ML: 900 INJECTION, SOLUTION INTRAVENOUS at 21:00

## 2019-09-09 RX ADMIN — SODIUM CHLORIDE 100 ML/HR: 900 INJECTION, SOLUTION INTRAVENOUS at 23:51

## 2019-09-09 RX ADMIN — CEFTRIAXONE 1 G: 1 INJECTION, POWDER, FOR SOLUTION INTRAMUSCULAR; INTRAVENOUS at 23:52

## 2019-09-09 RX ADMIN — TUBERCULIN PURIFIED PROTEIN DERIVATIVE 5 UNITS: 5 INJECTION, SOLUTION INTRADERMAL at 23:54

## 2019-09-09 RX ADMIN — INSULIN GLARGINE 10 UNITS: 100 INJECTION, SOLUTION SUBCUTANEOUS at 23:54
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
## 2019-09-10 ENCOUNTER — ANESTHESIA EVENT (OUTPATIENT)
Dept: SURGERY | Age: 54
DRG: 624 | End: 2019-09-10

## 2019-09-10 LAB
ANION GAP SERPL CALC-SCNC: 5 MMOL/L (ref 7–16)
APPEARANCE UR: CLEAR
ATRIAL RATE: 62 BPM
BILIRUB UR QL: NEGATIVE
BUN SERPL-MCNC: 23 MG/DL (ref 6–23)
CALCIUM SERPL-MCNC: 8.6 MG/DL (ref 8.3–10.4)
CALCULATED P AXIS, ECG09: 47 DEGREES
CALCULATED R AXIS, ECG10: 50 DEGREES
CALCULATED T AXIS, ECG11: 61 DEGREES
CHLORIDE SERPL-SCNC: 102 MMOL/L (ref 98–107)
CO2 SERPL-SCNC: 29 MMOL/L (ref 21–32)
COLOR UR: YELLOW
CREAT SERPL-MCNC: 1.26 MG/DL (ref 0.8–1.5)
CRP SERPL-MCNC: 3.6 MG/DL (ref 0–0.9)
DIAGNOSIS, 93000: NORMAL
ERYTHROCYTE [DISTWIDTH] IN BLOOD BY AUTOMATED COUNT: 13 % (ref 11.9–14.6)
GLUCOSE BLD STRIP.AUTO-MCNC: 124 MG/DL (ref 65–100)
GLUCOSE BLD STRIP.AUTO-MCNC: 154 MG/DL (ref 65–100)
GLUCOSE BLD STRIP.AUTO-MCNC: 180 MG/DL (ref 65–100)
GLUCOSE BLD STRIP.AUTO-MCNC: 222 MG/DL (ref 65–100)
GLUCOSE SERPL-MCNC: 145 MG/DL (ref 65–100)
GLUCOSE UR STRIP.AUTO-MCNC: NEGATIVE MG/DL
HCT VFR BLD AUTO: 35.7 % (ref 41.1–50.3)
HGB BLD-MCNC: 11.4 G/DL (ref 13.6–17.2)
HGB UR QL STRIP: NEGATIVE
KETONES UR QL STRIP.AUTO: NEGATIVE MG/DL
LEUKOCYTE ESTERASE UR QL STRIP.AUTO: NEGATIVE
MAGNESIUM SERPL-MCNC: 2 MG/DL (ref 1.8–2.4)
MCH RBC QN AUTO: 27.7 PG (ref 26.1–32.9)
MCHC RBC AUTO-ENTMCNC: 31.9 G/DL (ref 31.4–35)
MCV RBC AUTO: 86.7 FL (ref 79.6–97.8)
MM INDURATION POC: 0 MM (ref 0–5)
NITRITE UR QL STRIP.AUTO: NEGATIVE
NRBC # BLD: 0 K/UL (ref 0–0.2)
P-R INTERVAL, ECG05: 142 MS
PH UR STRIP: 5.5 [PH] (ref 5–9)
PLATELET # BLD AUTO: 220 K/UL (ref 150–450)
PMV BLD AUTO: 10.4 FL (ref 9.4–12.3)
POTASSIUM SERPL-SCNC: 4.4 MMOL/L (ref 3.5–5.1)
PPD POC: NEGATIVE NEGATIVE
PROT UR STRIP-MCNC: NEGATIVE MG/DL
Q-T INTERVAL, ECG07: 424 MS
QRS DURATION, ECG06: 76 MS
QTC CALCULATION (BEZET), ECG08: 430 MS
RBC # BLD AUTO: 4.12 M/UL (ref 4.23–5.6)
SODIUM SERPL-SCNC: 136 MMOL/L (ref 136–145)
SP GR UR REFRACTOMETRY: 1.01 (ref 1–1.02)
UROBILINOGEN UR QL STRIP.AUTO: 0.2 EU/DL (ref 0.2–1)
VENTRICULAR RATE, ECG03: 62 BPM
WBC # BLD AUTO: 8 K/UL (ref 4.3–11.1)

## 2019-09-10 PROCEDURE — 81003 URINALYSIS AUTO W/O SCOPE: CPT

## 2019-09-10 PROCEDURE — 74011000258 HC RX REV CODE- 258: Performed by: HOSPITALIST

## 2019-09-10 PROCEDURE — 96372 THER/PROPH/DIAG INJ SC/IM: CPT

## 2019-09-10 PROCEDURE — 77030018836 HC SOL IRR NACL ICUM -A

## 2019-09-10 PROCEDURE — 82962 GLUCOSE BLOOD TEST: CPT

## 2019-09-10 PROCEDURE — 77030020263 HC SOL INJ SOD CL0.9% LFCR 1000ML

## 2019-09-10 PROCEDURE — 86140 C-REACTIVE PROTEIN: CPT

## 2019-09-10 PROCEDURE — 80048 BASIC METABOLIC PNL TOTAL CA: CPT

## 2019-09-10 PROCEDURE — 96366 THER/PROPH/DIAG IV INF ADDON: CPT

## 2019-09-10 PROCEDURE — 83735 ASSAY OF MAGNESIUM: CPT

## 2019-09-10 PROCEDURE — 96361 HYDRATE IV INFUSION ADD-ON: CPT

## 2019-09-10 PROCEDURE — 99218 HC RM OBSERVATION: CPT

## 2019-09-10 PROCEDURE — 36415 COLL VENOUS BLD VENIPUNCTURE: CPT

## 2019-09-10 PROCEDURE — 74011250636 HC RX REV CODE- 250/636: Performed by: HOSPITALIST

## 2019-09-10 PROCEDURE — 85027 COMPLETE CBC AUTOMATED: CPT

## 2019-09-10 PROCEDURE — 74011636637 HC RX REV CODE- 636/637: Performed by: HOSPITALIST

## 2019-09-10 RX ORDER — MIDAZOLAM HYDROCHLORIDE 1 MG/ML
2 INJECTION, SOLUTION INTRAMUSCULAR; INTRAVENOUS
Status: CANCELLED | OUTPATIENT
Start: 2019-09-10 | End: 2019-09-11

## 2019-09-10 RX ORDER — SODIUM CHLORIDE, SODIUM LACTATE, POTASSIUM CHLORIDE, CALCIUM CHLORIDE 600; 310; 30; 20 MG/100ML; MG/100ML; MG/100ML; MG/100ML
1000 INJECTION, SOLUTION INTRAVENOUS CONTINUOUS
Status: CANCELLED | OUTPATIENT
Start: 2019-09-10 | End: 2019-09-11

## 2019-09-10 RX ORDER — LIDOCAINE HYDROCHLORIDE 10 MG/ML
0.1 INJECTION INFILTRATION; PERINEURAL AS NEEDED
Status: CANCELLED | OUTPATIENT
Start: 2019-09-10

## 2019-09-10 RX ORDER — FENTANYL CITRATE 50 UG/ML
100 INJECTION, SOLUTION INTRAMUSCULAR; INTRAVENOUS AS NEEDED
Status: CANCELLED | OUTPATIENT
Start: 2019-09-10

## 2019-09-10 RX ADMIN — Medication 10 ML: at 00:00

## 2019-09-10 RX ADMIN — HEPARIN SODIUM 5000 UNITS: 5000 INJECTION INTRAVENOUS; SUBCUTANEOUS at 05:12

## 2019-09-10 RX ADMIN — INSULIN LISPRO 2 UNITS: 100 INJECTION, SOLUTION INTRAVENOUS; SUBCUTANEOUS at 22:14

## 2019-09-10 RX ADMIN — SODIUM CHLORIDE 100 ML/HR: 900 INJECTION, SOLUTION INTRAVENOUS at 22:13

## 2019-09-10 RX ADMIN — INSULIN LISPRO 4 UNITS: 100 INJECTION, SOLUTION INTRAVENOUS; SUBCUTANEOUS at 13:30

## 2019-09-10 RX ADMIN — INSULIN GLARGINE 10 UNITS: 100 INJECTION, SOLUTION SUBCUTANEOUS at 22:14

## 2019-09-10 RX ADMIN — Medication 10 ML: at 05:13

## 2019-09-10 RX ADMIN — HEPARIN SODIUM 5000 UNITS: 5000 INJECTION INTRAVENOUS; SUBCUTANEOUS at 13:31

## 2019-09-10 RX ADMIN — INSULIN LISPRO 2 UNITS: 100 INJECTION, SOLUTION INTRAVENOUS; SUBCUTANEOUS at 08:48

## 2019-09-10 RX ADMIN — SODIUM CHLORIDE 100 ML/HR: 900 INJECTION, SOLUTION INTRAVENOUS at 08:48

## 2019-09-10 RX ADMIN — CEFTRIAXONE 1 G: 1 INJECTION, POWDER, FOR SOLUTION INTRAMUSCULAR; INTRAVENOUS at 23:53

## 2019-09-10 RX ADMIN — HEPARIN SODIUM 5000 UNITS: 5000 INJECTION INTRAVENOUS; SUBCUTANEOUS at 22:15

## 2019-09-10 RX ADMIN — Medication 10 ML: at 13:32

## 2019-09-10 NOTE — PROGRESS NOTES
Problem: Diabetes Self-Management  Goal: *Using medications safely  Description  State effect of diabetes medications on diabetes; name diabetes medication taking, action and side effects. Outcome: Progressing Towards Goal     Problem: Falls - Risk of  Goal: *Absence of Falls  Description  Document Rob Norberto Fall Risk and appropriate interventions in the flowsheet.   Outcome: Progressing Towards Goal  Note:   Fall Risk Interventions:  Mobility Interventions: Assess mobility with egress test, Bed/chair exit alarm, Communicate number of staff needed for ambulation/transfer, Mechanical lift, OT consult for ADLs, Patient to call before getting OOB, PT Consult for mobility concerns, PT Consult for assist device competence, Strengthening exercises (ROM-active/passive), Utilize walker, cane, or other assistive device         Medication Interventions: Assess postural VS orthostatic hypotension, Bed/chair exit alarm, Evaluate medications/consider consulting pharmacy, Patient to call before getting OOB, Teach patient to arise slowly

## 2019-09-10 NOTE — PROGRESS NOTES
09/09/19 9059   Dual Skin Pressure Injury Assessment   Dual Skin Pressure Injury Assessment WDL   Second Care Provider (Based on [address] [place] [address]) Nathaniel Brunson RN   Skin Integumentary   Skin Integumentary (WDL) X   Skin Color Appropriate for ethnicity   Skin Condition/Temp Warm;Dry   Skin Integrity Wound (add Wound LDA)  (RLE)   Turgor Non-tenting
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
## 2019-09-10 NOTE — PROGRESS NOTES
Hospitalist Progress Note    Patient: Kalee Marcelino MRN: [id number]  SSN: [id number]    YOB: 1965  Age: 49 y.o. Sex: male      Admit Date: 9/9/2019    LOS: 0 days     Subjective:     49year old male with poorly controlled DM2 and a right foot diabetic wound readmitted due to continued infection despite being on IV Ceftriaxone at home for a few days prior to \"losing\" his PICC line. 9/10 - States his foot feels better. \"I need to be on pills for my diabetes 'cos I'm tired of taking shots and probably won't when I go home. \" Denies CP/SOB. Denies F/C/N/V. Review of systems negative except stated above. Objective:     Visit Vitals  /79   Pulse 68   Temp 97.8 °F (36.6 °C)   Resp 18   Ht 5' 9\" (1.753 m)   Wt 95.3 kg (210 lb)   SpO2 96%   BMI 31.01 kg/m²      Oxygen Therapy  O2 Sat (%): 96 % (09/10/19 1238)  O2 Device: Room air (09/10/19 0800)      Intake and Output: No intake or output data in the 24 hours ending 09/10/19 1622      Physical Exam:   GENERAL: alert, cooperative, no distress, appears stated age  EYE: conjunctivae/corneas clear. PERRL. THROAT & NECK: normal and no erythema or exudates noted. LUNG: clear to auscultation bilaterally  HEART: regular rate and rhythm, S1S2, no murmur, no JVD  ABDOMEN: soft, non-tender, non-distended. Bowel sounds normal.   EXTREMITIES:  Right foot with extensive wound with serous drainage  SKIN: Right foot with extensive wound with serous drainage  NEUROLOGIC: A&Ox3. Cranial nerves 2-12 grossly intact.     Lab/Data Review:  Recent Results (from the past 24 hour(s))   GLUCOSE, POC    Collection Time: 09/09/19  6:09 PM   Result Value Ref Range    Glucose (POC) 210 (H) 65 - 100 mg/dL   CBC WITH AUTOMATED DIFF    Collection Time: 09/09/19  7:00 PM   Result Value Ref Range    WBC 11.2 (H) 4.3 - 11.1 K/uL    RBC 4.58 4.23 - 5.6 M/uL    HGB 12.9 (L) 13.6 - 17.2 g/dL    HCT 39.1 (L) 41.1 - 50.3 %    MCV 85.4 79.6 - 97.8 FL    MCH 28.2 26.1 - 32.9 PG MCHC 33.0 31.4 - 35.0 g/dL    RDW 13.2 11.9 - 14.6 %    PLATELET 151 712 - 912 K/uL    MPV 10.5 9.4 - 12.3 FL    ABSOLUTE NRBC 0.00 0.0 - 0.2 K/uL    DF AUTOMATED      NEUTROPHILS 68 43 - 78 %    LYMPHOCYTES 21 13 - 44 %    MONOCYTES 7 4.0 - 12.0 %    EOSINOPHILS 2 0.5 - 7.8 %    BASOPHILS 1 0.0 - 2.0 %    IMMATURE GRANULOCYTES 1 0.0 - 5.0 %    ABS. NEUTROPHILS 7.6 1.7 - 8.2 K/UL    ABS. LYMPHOCYTES 2.3 0.5 - 4.6 K/UL    ABS. MONOCYTES 0.8 0.1 - 1.3 K/UL    ABS. EOSINOPHILS 0.3 0.0 - 0.8 K/UL    ABS. BASOPHILS 0.1 0.0 - 0.2 K/UL    ABS. IMM. GRANS. 0.1 0.0 - 0.5 K/UL   HEMOGLOBIN A1C WITH EAG    Collection Time: 09/09/19  7:00 PM   Result Value Ref Range    Hemoglobin A1c 8.2 %    Est. average glucose 189 mg/dL   EKG, 12 LEAD, INITIAL    Collection Time: 09/09/19  7:05 PM   Result Value Ref Range    Ventricular Rate 62 BPM    Atrial Rate 62 BPM    P-R Interval 142 ms    QRS Duration 76 ms    Q-T Interval 424 ms    QTC Calculation (Bezet) 430 ms    Calculated P Axis 47 degrees    Calculated R Axis 50 degrees    Calculated T Axis 61 degrees    Diagnosis       Normal sinus rhythm  Cannot rule out Anterior infarct , age undetermined  Abnormal ECG  No previous ECGs available  Confirmed by [facility]  MD (), Coreen Arevalo ([id number]) on 9/10/2019 9:59:44 AM     METABOLIC PANEL, COMPREHENSIVE    Collection Time: 09/09/19  8:24 PM   Result Value Ref Range    Sodium 134 (L) 136 - 145 mmol/L    Potassium 4.9 3.5 - 5.1 mmol/L    Chloride 99 98 - 107 mmol/L    CO2 27 21 - 32 mmol/L    Anion gap 8 7 - 16 mmol/L    Glucose 187 (H) 65 - 100 mg/dL    BUN 26 (H) 6 - 23 MG/DL    Creatinine 1.52 (H) 0.8 - 1.5 MG/DL    GFR est AA >60 >60 ml/min/1.73m2    GFR est non-AA 51 (L) >60 ml/min/1.73m2    Calcium 9.1 8.3 - 10.4 MG/DL    Bilirubin, total 0.7 0.2 - 1.1 MG/DL    ALT (SGPT) 31 12 - 65 U/L    AST (SGOT) 25 15 - 37 U/L    Alk.  phosphatase 112 50 - 136 U/L    Protein, total 8.2 6.3 - 8.2 g/dL    Albumin 3.6 3.5 - 5.0 g/dL    Globulin 4.6 (H) 2.3 - 3.5 g/dL    A-G Ratio 0.8 (L) 1.2 - 3.5     GLUCOSE, POC    Collection Time: 09/09/19 11:47 PM   Result Value Ref Range    Glucose (POC) 183 (H) 65 - 790 mg/dL   METABOLIC PANEL, BASIC    Collection Time: 09/10/19  4:56 AM   Result Value Ref Range    Sodium 136 136 - 145 mmol/L    Potassium 4.4 3.5 - 5.1 mmol/L    Chloride 102 98 - 107 mmol/L    CO2 29 21 - 32 mmol/L    Anion gap 5 (L) 7 - 16 mmol/L    Glucose 145 (H) 65 - 100 mg/dL    BUN 23 6 - 23 MG/DL    Creatinine 1.26 0.8 - 1.5 MG/DL    GFR est AA >60 >60 ml/min/1.73m2    GFR est non-AA >60 >60 ml/min/1.73m2    Calcium 8.6 8.3 - 10.4 MG/DL   CBC W/O DIFF    Collection Time: 09/10/19  4:56 AM   Result Value Ref Range    WBC 8.0 4.3 - 11.1 K/uL    RBC 4.12 (L) 4.23 - 5.6 M/uL    HGB 11.4 (L) 13.6 - 17.2 g/dL    HCT 35.7 (L) 41.1 - 50.3 %    MCV 86.7 79.6 - 97.8 FL    MCH 27.7 26.1 - 32.9 PG    MCHC 31.9 31.4 - 35.0 g/dL    RDW 13.0 11.9 - 14.6 %    PLATELET 319 426 - 990 K/uL    MPV 10.4 9.4 - 12.3 FL    ABSOLUTE NRBC 0.00 0.0 - 0.2 K/uL   MAGNESIUM    Collection Time: 09/10/19  4:56 AM   Result Value Ref Range    Magnesium 2.0 1.8 - 2.4 mg/dL   C REACTIVE PROTEIN, QT    Collection Time: 09/10/19  4:56 AM   Result Value Ref Range    C-Reactive protein 3.6 (H) 0.0 - 0.9 mg/dL   GLUCOSE, POC    Collection Time: 09/10/19  7:28 AM   Result Value Ref Range    Glucose (POC) 180 (H) 65 - 100 mg/dL   GLUCOSE, POC    Collection Time: 09/10/19 12:49 PM   Result Value Ref Range    Glucose (POC) 222 (H) 65 - 100 mg/dL       Imaging:  Xr Foot Rt Min 3 V    Result Date: 9/9/2019  RIGHT FOOT SERIES HISTORY: Pain COMPARISON: August 23, 2019 FINDINGS: The fifth toe has been amputated. There is no displaced fracture, malalignment or osseous erosions. IMPRESSION: Fifth toe amputation. Xr Foot Rt Min 3 V    Result Date: 8/23/2019  History: Redness and swelling of the right foot.  Pain EXAM: 3 views right foot FINDINGS: There is a question of a triangular shaped foreign body along the plantar surface of the right foot at the level of the third proximal phalanx. There is subcutaneous emphysema, raising possibility for superimposed infection. No fracture or dislocation. IMPRESSION: Findings suggestive of triangular radiopaque foreign body within the plantar soft tissues at the level of the third proximal phalanx. There is also subcutaneous emphysema seen extending from the first through fifth digits at the level of the proximal phalanges. Mri Foot Rt W Wo Cont    Result Date: 8/26/2019  HISTORY: Recent fifth toe amputation and foreign body removal. EXAM: MRI right foot with and without contrast TECHNIQUE: Multiplanar multisequence imaging is performed both before and after the uneventful administration of 20 mL Dotarem. COMPARISON: Plain films dated 8/23/2019 FINDINGS: The exam is compromised by patient motion. There is a large soft tissue defect seen along the plantar surface of the right foot. Findings compatible with the patient's given clinical history of recent surgery. The patient is status post interval removal of the fifth digit at the level of the MTP articulation. No evidence of osteomyelitis or abscess. There is edema in the plantar musculature of the right foot. IMPRESSION: 1. Large soft tissue defect with postsurgical change as described. 2. No evidence of abscess or osteomyelitis. 3. Plantar muscular edema. Findings compatible with chronic denervation change. Duplex Low Ext Arteries With Karolina    Result Date: 8/26/2019  TITLE: Lower extremity arterial ultrasound examination. INDICATION: Uncontrolled diabetes. Diabetic foot infection. TECHNIQUE: Grayscale, color, and Doppler interrogation performed. The entire length of all of the arteries of the extremity were evaluated. COMPARISON: None. SEGMENTAL BP:  The right and left lower extremity segmental blood pressures are fairly symmetric and unremarkable.  KAROLINA:  Right = 1.28            Left = 1.28 RIGHT LOWER EXTREMITY:  Peak systolic velocities-- within normal limits. Normal triphasic waveform throughout. LEFT LOWER EXTREMITY:  Peak systolic velocities-- within normal limits. Mildly elevated distal popliteal artery velocity. Normal triphasic waveforms throughout. ABDOMEN:  No evidence of aneurysm. IMPRESSION:  No significant arterial stenosis identified. No results found for this visit on 09/09/19. Cultures: All Micro Results     None          Assessment/Plan:     Principal Problem:    Diabetic ulcer of right foot associated with type 2 diabetes mellitus, with necrosis of muscle ([facility] [address] (8/24/2019)  - To possibly got to OR for skin graft on 9/11  - Continue Ceftriaxone based on previous cultures  - Appreciate general surgery's input and assistance    Active Problems:    Diabetic foot infection ([address] (9/9/2019)  - Continue Ceftriaxone based on previous cultures  - Appreciate general surgery's input and assistance      Leukocytosis (9/9/2019)  - Resolved      Uncontrolled type II diabetes mellitus ([facility] [address] (8/23/2019)  - A1C 8.2  - Blood sugars mildly elevated  - Continue Lantus 10U (since will be NPO tonight)  - Continue Humalog SSI  - Will discuss with diabetic educators about best PO options due to patient being low/no income      Medically noncompliant (9/9/2019)    Dispo - Possibly to OR tomorrow. Would benefit from placement at discharge if possible.     DIET DIABETIC CONSISTENT CARB Regular  DIET NPO    DVT Prophylaxis: Heparin      Signed By: Roberto Garvey DO     September 10, 2019

## 2019-09-10 NOTE — PROGRESS NOTES
H&P/Consult Note/Progress Note/Office Note:   Popeye Beckford  MRN: [id number]  :1965  Age:54 y.o.    HPI: Popeye Beckford is a 49 y.o. male who was admitted on 19 from the ER by the Hospitalists with a diabetic right foot infection. He is s/p multiple right foot abscess drainages, removal of glass foreign body and right 5th toe amputation on 19. Prior to admission he reported progressive, constant, severe right foot pain for at least 2 days. He was walking barefoot. He had associated low grade fever. He was placed on IV Abx by Hospitalists  He has h/o DM and non-compliance (No primary care doctor, not on DM meds)  Bld Cxs from admission grew gm + cocci; Wound cultures from [place] grew pan-sensitive Strep and Staph Aureus   Xray as below    Surgery recommended inpt care or SNF but he had no insurance   helped him apply for medicaid  He was discharged home with home health. [facility] found him in unsafe living environment with described cat feces on all surfaces in the house and refused to go back out to his home. Pt reported health department officials came out an are threatening to condemn his property 30days    He had between 6-15 cats in and out of the house  He was not taking his insulin because \"no one showed me how\"      I have spoken to ER, ER triage nurse who saw pt on 19,  Janie Dyer,  Santana Heredia, pt's sister (in another state), and hospitalists regarding need for pt to be admitted should he reappear at our facility. ER recently noted much dirt, hair and cigarette butt in his wound. He is a diabetic with no meds, transportation, or resources to gets meds. HE NEEDS TO BE ADMITTED FOR INPT CARE due to failure of outpt management          19 Xray right foot, 3 views  Hx: Redness and swelling of the right foot.  Pain     There is a question of a triangular shaped foreign body along the plantar surface of the right foot   at the level of the third proximal phalanx. There is subcutaneous emphysema, raising possibility for superimposed infection. No fracture or dislocation.     IMPRESSION: Findings suggestive of triangular radiopaque foreign body within the  plantar soft tissues at the level of the third proximal phalanx. There is also  subcutaneous emphysema seen extending from the first through fifth digits at the  level of the proximal phalanges    8/26/19 Arterial duplex  The right and left lower extremity segmental blood pressures are fairly symmetric and unremarkable. KAROLINA:  Right = 1.28              Left = 1.28     RIGHT LOWER EXTREMITY:  Peak systolic velocities-- within normal limits. Normal triphasic waveform throughout.     LEFT LOWER EXTREMITY:  Peak systolic velocities-- within normal limits. Mildly  elevated distal popliteal artery velocity.  Normal triphasic waveforms throughout.     ABDOMEN:  No evidence of aneurysm.     IMPRESSION:  No significant arterial stenosis identified.             8/26/19 POD1 right foot debridement, abscess drainage, 5th toe amputation, and FB removal  8/27/19 POD2 No sign arterial stenosis on non-invasive vasc study   8/28/19 POD3 cultures from OR with alpha Strep  8/29/19 POD4 On IV Vanc; daily dressing changes; off-loading; case management working on medicaid and SNF placement  8/30/19 POD5; ON IV ceftriaxone per ID until 9/8/19 8/31/19 POD6; IV ceftriaxone planned until 9/8/19; no weight bearing right foot    9/9/19 Arrived in ER for re-admission        Past Medical History:   Diagnosis Date    Diabetes [facility])      Past Surgical History:   Procedure Laterality Date    HX CHOLECYSTECTOMY       Current Facility-Administered Medications   Medication Dose Route Frequency    dextrose 40% (GLUTOSE) oral gel 1 Tube  15 g Oral PRN    glucagon (GLUCAGEN) injection 1 mg  1 mg IntraMUSCular PRN    dextrose (D50W) injection syrg 12.5-25 g  25-50 mL IntraVENous PRN    insulin lispro (HUMALOG) injection SubCUTAneous AC&HS    sodium chloride (NS) flush 5-40 mL  5-40 mL IntraVENous Q8H    sodium chloride (NS) flush 5-40 mL  5-40 mL IntraVENous PRN    acetaminophen (TYLENOL) tablet 650 mg  650 mg Oral Q4H PRN    0.9% sodium chloride infusion  100 mL/hr IntraVENous CONTINUOUS    ondansetron (ZOFRAN) injection 4 mg  4 mg IntraVENous Q4H PRN    heparin (porcine) injection 5,000 Units  5,000 Units SubCUTAneous Q8H    bisacodyl (DULCOLAX) tablet 5 mg  5 mg Oral DAILY PRN    diphenhydrAMINE (BENADRYL) capsule 25 mg  25 mg Oral Q6H PRN    cefTRIAXone (ROCEPHIN) 1 g in 0.9% sodium chloride (MBP/ADV) 50 mL  1 g IntraVENous Q24H    tuberculin injection 5 Units  5 Units IntraDERMal ONCE    insulin glargine (LANTUS) injection 10 Units  10 Units SubCUTAneous QHS     Patient has no known allergies. Social History     Socioeconomic History    Marital status: SINGLE     Spouse name: Not on file    Number of children: Not on file    Years of education: Not on file    Highest education level: Not on file   Tobacco Use    Smoking status: Current Every Day Smoker    Smokeless tobacco: Never Used   Substance and Sexual Activity    Alcohol use: No    Sexual activity: Never     Social History     Tobacco Use   Smoking Status Current Every Day Smoker   Smokeless Tobacco Never Used     History reviewed. No pertinent family history. ROS: The patient has no difficulty with chest pain or shortness of breath. No fever or chills. Comprehensive review of systems was otherwise unremarkable except as noted above.     Physical Exam:   Visit Vitals  /66 (BP 1 Location: Right arm, BP Patient Position: At rest;Head of bed elevated (Comment degrees))   Pulse 61   Temp 98.1 °F (36.7 °C)   Resp 18   Ht 5' 9\" (1.753 m)   Wt 210 lb (95.3 kg)   SpO2 98%   BMI 31.01 kg/m²     Vitals:    09/09/19 1806 09/09/19 2335 09/10/19 0328   BP: 104/70 150/82 115/66   Pulse: 65 63 61   Resp: 16 18 18   Temp: 98 °F (36.7 °C) 97.6 °F (36.4 °C) 98.1 °F (36.7 °C)   SpO2: 96% 99% 98%   Weight: 210 lb (95.3 kg)     Height: 5' 9\" (1.753 m)       No intake/output data recorded. No intake/output data recorded. Constitutional: Alert, oriented, cooperative patient in no acute distress; appears stated age    Eyes:Sclera are clear. ENMT: no external lesions gross hearing normal; no obvious neck masses, no ear or lip lesions, nares normal  CV: RRR. Normal perfusion  Resp: Breathing is  non-labored; no audible wheezing. GI: soft and non-distended     Musculoskeletal: unremarkable with normal function. No embolic signs or cyanosis. Right 5th toe amputation. Exposed tendons on plantar right foot  Some early granulation appearing in wound bu much biofilm and some debris from suboptimal wound care   No fluctuance;  No crepitus, No necrosis or debris  +edema    Neuro:  Oriented; moves all 4; no focal deficits  Psychiatric: normal affect and mood, no memory impairment                                Recent vitals (if inpt):  Patient Vitals for the past 24 hrs:   BP Temp Pulse Resp SpO2 Height Weight   09/10/19 0328 115/66 98.1 °F (36.7 °C) 61 18 98 % -- --   09/09/19 2335 150/82 97.6 °F (36.4 °C) 63 18 99 % -- --   09/09/19 1806 104/70 98 °F (36.7 °C) 65 16 96 % 5' 9\" (1.753 m) 210 lb (95.3 kg)       Labs:  Recent Labs     09/10/19  0456 09/09/19 2024   WBC 8.0  --    HGB 11.4*  --      --     134*   K 4.4 4.9    99   CO2 29 27   BUN 23 26*   CREA 1.26 1.52*   * 187*   TBILI  --  0.7   SGOT  --  25   ALT  --  31   AP  --  112       Lab Results   Component Value Date/Time    WBC 8.0 09/10/2019 04:56 AM    HGB 11.4 (L) 09/10/2019 04:56 AM    PLATELET 166 81/14/6767 04:56 AM    Sodium 136 09/10/2019 04:56 AM    Potassium 4.4 09/10/2019 04:56 AM    Chloride 102 09/10/2019 04:56 AM    CO2 29 09/10/2019 04:56 AM    BUN 23 09/10/2019 04:56 AM    Creatinine 1.26 09/10/2019 04:56 AM    Glucose 145 (H) 09/10/2019 04:56 AM    Bilirubin, total 0.7 09/09/2019 08:24 PM    AST (SGOT) 25 09/09/2019 08:24 PM    ALT (SGPT) 31 09/09/2019 08:24 PM    Alk. phosphatase 112 09/09/2019 08:24 PM       CT Results  (Last 48 hours)    None        chest X-ray      I reviewed recent labs, recent radiologic studies, and pertinent records including other doctor notes if needed. I independently reviewed radiology images for studies I described above or studies I have ordered. Admission date (for inpatients): 9/9/2019   * No surgery found *  * No surgery found *    ASSESSMENT/PLAN:  Problem List  Date Reviewed: 9/9/2019          Codes Class Noted    Leukocytosis ICD-10-CM: D72.829  ICD-9-CM: 288.60  9/9/2019        Medically noncompliant ICD-10-CM: Z91.19  ICD-9-CM: V15.81  9/9/2019        Open wound of right foot ICD-10-CM: S91.301A  ICD-9-CM: 892.0  9/9/2019        * (Principal) Diabetic foot infection ([facility] ICD-10-CM: E11.628, L08.9  ICD-9-CM: 250.80, 686.9  9/9/2019        Diabetic ulcer of toe of right foot associated with type 2 diabetes mellitus, with necrosis of muscle ([facility] ICD-10-CM: E11.621, L97.513  ICD-9-CM: 250.80, 707.15  8/24/2019        Sepsis ([facility] ICD-10-CM: A41.9  ICD-9-CM: 038.9, 995.91  8/24/2019        Cellulitis of right leg ICD-10-CM: L03.115  ICD-9-CM: 682.6  8/23/2019        Foreign body (FB) in soft tissue ICD-10-CM: M79.5  ICD-9-CM: 729.6  8/23/2019        Subcutaneous emphysema ([facility] ICD-10-CM: T79. 7XXA  ICD-9-CM: 958.7  8/23/2019        Uncontrolled type II diabetes mellitus ([facility] ICD-10-CM: E11.65  ICD-9-CM: 250.02  8/23/2019        Personal history of noncompliance with medical treatment ICD-10-CM: Z91.19  ICD-9-CM: V15.81  8/23/2019        Hyponatremia ICD-10-CM: E87.1  ICD-9-CM: 276.1  8/23/2019            Principal Problem:    Diabetic foot infection ([facility] (9/9/2019)    Active Problems:    Uncontrolled type II diabetes mellitus ([facility] (8/23/2019)      Diabetic ulcer of toe of right foot associated with type 2 diabetes mellitus, with necrosis of muscle ([facility] [address] (8/24/2019)      Leukocytosis (9/9/2019)      Medically noncompliant (9/9/2019)      Open wound of right foot (9/9/2019)         Diabetic right foot infection - limb threatening  Large open plantar wound  S/p right 5th toe amputation and distal plantar forefoot debridement with extraction of glass (FB)  OR cultures --->gm + cocci with alpha Strep  Defer Abx modification to Hospitalists and ID     I wrote inpt wound care orders for the foot. Considering foot debridement and STSG on 9/11/19  He needs to be complete non-weight bearing for right foot     Recommend SNF placement for 1-2 months  He failed outpt management and has demonstrated he won't be compliant with outpt Rx at home (diabetic, dressings, off-loading, etc...)  He lives alone without support in a home that the health department is threatening to condmenn in approx 25 days      PVD  No significant arterial stenosis noted on non-invasive arterial duplex on 8/26/19    Uncontrolled DM  Inpt diabetic control  Sliding scale and semiquants    Non-Compliance with outpt DM regimen  Encourage compliance  Risks of non-compliance discussed including amputation and early death  He needs SNF            I have personally performed a face-to-face diagnostic evaluation and management  service on this patient. I have independently seen the patient. I have independently obtained the above history from the patient/family. I have independently examined the patient with above findings. I have independently reviewed data/labs for this patient and developed the above plan of care (MDM). Signed: Shady Patterson.  Elysia Mcguire MD, FACS

## 2019-09-10 NOTE — PROGRESS NOTES
Care Management Interventions  Transition of Care Consult (CM Consult): Discharge Planning  Current Support Network: Lives Alone  Discharge Location  Discharge Placement: Unable to determine at this time    Slim reviewed 16 case management notes today. Slim called Anisha Greco with [employer] to gain a better understanding of pt's Medicaid /Disability status. She informed that they are actively working his case. Pt will be granted a phone interview and Anisha Greco will likely have that information by the end of this week. Pt will be notified by cell phone or mail with his interview date/time. After the interview pt's medical records will all be requested for review. His disability claim is still likely at least 90 days out from approval. Sw needs to obtain pt's sister's phone number in order to investigate his history further. Pt may have been in the mental health system or could be under DDSN services due to his mental limitations. Slim would also like to discuss case further with APS as to what their involvement will be. Pt is very likely awaiting surgery. At this time this Sw does not have enough information to formulate a plan. At this time pt has NO PAYER SOURCE and therefore his discharge options are limited. Slim will cont to thoroughly investigate his social history in [facility]. Pt's d/c plans will be contingent upon what he will agree to pending if he is determined to be able to continue to make his own decisions. Slim will perform at Mini Mental exam tomorrow if he will allow.  Slim will cont to follow and assist. Martínez Aldridge

## 2019-09-10 NOTE — H&P
Hospitalist H&P/Consult Note     Admit Date:  2019  7:48 PM   Name:  Gregoria Donovan   Age:  49 y.o.  :  1965   MRN:  [id number]   PCP:  Rodo Hernadez MD  Treatment Team: Attending Provider: Kaitlin Ziegler MD; Consulting Provider: Riaz Alfonso MD    HPI:   Patient is a 50 y/o male with hx poorly controlled diabetes who recently developed a diabetic wound infection of his right foot that resulted in the amputation of his right fifth toe. Reportedly there was a piece of glass found in his foot. He had been hospitalized from -. He had gone home with a PICC line and was to receive IV rocephin for several days at home. He states he lost the PICC line at home. When home health went to check on his wound at home they were unable to provide services due to deplorable conditions inside with multiple cats inside and urine and feces everywhere. He was also found to have a cigarette butt stuck to his foot wound that he was unaware of. He has not been managing his diabetes and sugars running over 200. A1c is 8.2. It was felt he would benefit from an observational admission for wound care, diabetic teaching and case management consult for proper outpatient wound management. May benefit from STR if qualifies. Patient's sister said his wound was draining yellow material earlier in the day and he had been walking on the affected foot even though he had been instructed not to.     10 systems reviewed and negative except as noted in HPI. Past Medical History:   Diagnosis Date    Diabetes [facility])       Past Surgical History:   Procedure Laterality Date    HX CHOLECYSTECTOMY        Prior to Admission Medications   Prescriptions Last Dose Informant Patient Reported? Taking? amLODIPine (NORVASC) 5 mg tablet   No Yes   Sig: Take 1 Tab by mouth daily. cefTRIAXone 2 gram 2 g, ADDaptor 1 Device IVPB   No No   Si g by IntraVENous route every twenty-four (24) hours for 4 days.    cephALEXin (KEFLEX) 500 mg capsule   Yes Yes   Sig: TK ONE C PO  QID   insulin glargine (LANTUS) 100 unit/mL injection   No Yes   Sig: 10 units subcutaneous daily at night  Indications: type 2 diabetes mellitus   lisinopril (PRINIVIL, ZESTRIL) 40 mg tablet   No Yes   Sig: Take 1 Tab by mouth daily. Facility-Administered Medications: None     No Known Allergies   Social History     Tobacco Use    Smoking status: Current Every Day Smoker    Smokeless tobacco: Never Used   Substance Use Topics    Alcohol use: No      History reviewed. No pertinent family history. Immunization History   Administered Date(s) Administered    Influenza Vaccine (Quad) PF 09/04/2019    TB Skin Test (PPD) Intradermal 08/31/2019, 09/09/2019    Td, Adsorbed PF 08/26/2019       Objective:     Patient Vitals for the past 24 hrs:   Temp Pulse Resp BP SpO2   09/09/19 2335 97.6 °F (36.4 °C) 63 18 150/82 99 %   09/09/19 1806 98 °F (36.7 °C) 65 16 104/70 96 %     Oxygen Therapy  O2 Sat (%): 99 % (09/09/19 2335)  O2 Device: Room air (09/09/19 1806)  No intake or output data in the 24 hours ending 09/10/19 0142    Physical Exam:  General:    Well nourished. Alert. pale   Eyes:   Normal sclera. Extraocular movements intact. ENT:  Normocephalic, atraumatic. Moist mucous membranes  CV:   RRR. No murmur, rub, or gallop. Lungs:  CTAB. No wheezing, rhonchi, or rales. Abdomen: Soft, nontender, nondistended. Bowel sounds normal.   Extremities: Warm and dry. No cyanosis or edema. Neurologic: CN II-XII grossly intact. Diminished light touch sensation to feet  Skin:     Right fifth toe amputated. Open wound forefoot  Psych:  Normal mood and affect. I reviewed the labs, imaging, EKGs, telemetry, and other studies done this admission.   Data Review:   Recent Results (from the past 24 hour(s))   GLUCOSE, POC    Collection Time: 09/09/19  6:09 PM   Result Value Ref Range    Glucose (POC) 210 (H) 65 - 100 mg/dL   CBC WITH AUTOMATED DIFF    Collection Time: 09/09/19 7:00 PM   Result Value Ref Range    WBC 11.2 (H) 4.3 - 11.1 K/uL    RBC 4.58 4.23 - 5.6 M/uL    HGB 12.9 (L) 13.6 - 17.2 g/dL    HCT 39.1 (L) 41.1 - 50.3 %    MCV 85.4 79.6 - 97.8 FL    MCH 28.2 26.1 - 32.9 PG    MCHC 33.0 31.4 - 35.0 g/dL    RDW 13.2 11.9 - 14.6 %    PLATELET 422 413 - 624 K/uL    MPV 10.5 9.4 - 12.3 FL    ABSOLUTE NRBC 0.00 0.0 - 0.2 K/uL    DF AUTOMATED      NEUTROPHILS 68 43 - 78 %    LYMPHOCYTES 21 13 - 44 %    MONOCYTES 7 4.0 - 12.0 %    EOSINOPHILS 2 0.5 - 7.8 %    BASOPHILS 1 0.0 - 2.0 %    IMMATURE GRANULOCYTES 1 0.0 - 5.0 %    ABS. NEUTROPHILS 7.6 1.7 - 8.2 K/UL    ABS. LYMPHOCYTES 2.3 0.5 - 4.6 K/UL    ABS. MONOCYTES 0.8 0.1 - 1.3 K/UL    ABS. EOSINOPHILS 0.3 0.0 - 0.8 K/UL    ABS. BASOPHILS 0.1 0.0 - 0.2 K/UL    ABS. IMM.  GRANS. 0.1 0.0 - 0.5 K/UL   HEMOGLOBIN A1C WITH EAG    Collection Time: 09/09/19  7:00 PM   Result Value Ref Range    Hemoglobin A1c 8.2 %    Est. average glucose 189 mg/dL   EKG, 12 LEAD, INITIAL    Collection Time: 09/09/19  7:05 PM   Result Value Ref Range    Ventricular Rate 62 BPM    Atrial Rate 62 BPM    P-R Interval 142 ms    QRS Duration 76 ms    Q-T Interval 424 ms    QTC Calculation (Bezet) 430 ms    Calculated P Axis 47 degrees    Calculated R Axis 50 degrees    Calculated T Axis 61 degrees    Diagnosis       Normal sinus rhythm  Cannot rule out Anterior infarct , age undetermined  Abnormal ECG  No previous ECGs available     METABOLIC PANEL, COMPREHENSIVE    Collection Time: 09/09/19  8:24 PM   Result Value Ref Range    Sodium 134 (L) 136 - 145 mmol/L    Potassium 4.9 3.5 - 5.1 mmol/L    Chloride 99 98 - 107 mmol/L    CO2 27 21 - 32 mmol/L    Anion gap 8 7 - 16 mmol/L    Glucose 187 (H) 65 - 100 mg/dL    BUN 26 (H) 6 - 23 MG/DL    Creatinine 1.52 (H) 0.8 - 1.5 MG/DL    GFR est AA >60 >60 ml/min/1.73m2    GFR est non-AA 51 (L) >60 ml/min/1.73m2    Calcium 9.1 8.3 - 10.4 MG/DL    Bilirubin, total 0.7 0.2 - 1.1 MG/DL    ALT (SGPT) 31 12 - 65 U/L    AST (SGOT) 25 15 - 37 U/L    Alk.  phosphatase 112 50 - 136 U/L    Protein, total 8.2 6.3 - 8.2 g/dL    Albumin 3.6 3.5 - 5.0 g/dL    Globulin 4.6 (H) 2.3 - 3.5 g/dL    A-G Ratio 0.8 (L) 1.2 - 3.5     GLUCOSE, POC    Collection Time: 09/09/19 11:47 PM   Result Value Ref Range    Glucose (POC) 183 (H) 65 - 100 mg/dL       Imaging Studies:  CXR Results  (Last 48 hours)    None        CT Results  (Last 48 hours)    None          Assessment and Plan:     Hospital Problems as of 9/10/2019 Date Reviewed: 9/9/2019          Codes Class Noted - Resolved POA    * (Principal) Diabetic foot infection ([facility] ICD-10-CM: E11.628, L08.9  ICD-9-CM: 250.80, 686.9  9/9/2019 - Present Yes        Leukocytosis ICD-10-CM: E09.073  ICD-9-CM: 288.60  9/9/2019 - Present Yes        Medically noncompliant ICD-10-CM: Z91.19  ICD-9-CM: V15.81  9/9/2019 - Present Yes        Open wound of right foot ICD-10-CM: S91.301A  ICD-9-CM: 892.0  9/9/2019 - Present Yes        Diabetic ulcer of toe of right foot associated with type 2 diabetes mellitus, with necrosis of muscle (HCC) ICD-10-CM: E11.621, W24.729  ICD-9-CM: 250.80, 707.15  8/24/2019 - Present Yes        Uncontrolled type II diabetes mellitus ([facility] ICD-10-CM: E11.65  ICD-9-CM: 250.02  8/23/2019 - Present Yes              PLAN:  · Admit as observation to medical floor  · Needs proper wound care and dressings  · Resume IV rocephin (recent culture with pan sensitive staph aureus)  · HgA1c 8.2 (avg glucose 189), needs better diabetes management  · Provide diabetic teaching  · Monitor WBC ct and CRP  · Case management consult in am  · SQ heparin for DVT prophylaxis  · Discussed with family at bedside      Estimated LOS:  observation    Signed:  Manny Diggs MD

## 2019-09-10 NOTE — PROGRESS NOTES
Admission assessment completed via doc flow sheet. Pt A & O x 4. Lungs CTA, HR WNL, NSR. Abdomen soft and non tender. Post surgical wound noted to right foot, dressing changed per MD order with wet 4x4, wrapped in Kerlix. No c/o pain at this time. IVS c/d/i, no s/sx of infection/infiltration noted. Pt able to make needs known. No concerns at this time. Bed low and locked. Call light within reach. Will continue to monitor.

## 2019-09-10 NOTE — ED NOTES
TRANSFER - OUT REPORT:    Verbal report given to [facility] AT [facility], RN(name) on Karla Pisano  being transferred to [facility](unit) for routine progression of care       Report consisted of patients Situation, Background, Assessment and   Recommendations(SBAR). Information from the following report(s) SBAR, ED Summary, [facility] and Recent Results was reviewed with the receiving nurse. Lines:   Peripheral IV 09/09/19 Left Hand (Active)   Site Assessment Clean, dry, & intact 9/9/2019  7:10 PM   Phlebitis Assessment 0 9/9/2019  7:10 PM   Infiltration Assessment 0 9/9/2019  7:10 PM   Dressing Status Clean, dry, & intact 9/9/2019  7:10 PM   Dressing Type Transparent;Tape 9/9/2019  7:10 PM   Hub Color/Line Status Pink;Flushed;Patent 9/9/2019  7:10 PM   Action Taken Blood drawn 9/9/2019  7:10 PM        Opportunity for questions and clarification was provided.       Patient transported with:   [employer]

## 2019-09-10 NOTE — PROGRESS NOTES
Assessment complete. Pt sitting up in bed, A&Ox3. Pt has no complaints except that he needs a remote. Functioning remote given to pt. Pt has dressing to right foot, CDI. Pt voices no needs at this time. Call light in reach.

## 2019-09-10 NOTE — CONSULTS
H&P/Consult Note/Progress Note/Office Note:   Azul Longoria  MRN: [id number]  :1965  Age:54 y.o.    HPI: Azul Longoria is a 49 y.o. male who was admitted on 19 from the ER by the Hospitalists with a diabetic right foot infection. He is s/p multiple right foot abscess drainages, removal of glass foreign body and right 5th toe amputation on 19. Prior to admission he reported progressive, constant, severe right foot pain for at least 2 days. He was walking barefoot. He had associated low grade fever. He was placed on IV Abx by Hospitalists  He has h/o DM and non-compliance (No primary care doctor, not on DM meds)  Bld Cxs from admission grew gm + cocci; Wound cultures from [place] grew pan-sensitive Strep and Staph Aureus   Xray as below    Surgery recommended inpt care or SNF but he had no insurance   helped him apply for medicaid  He was discharged home with home health. [facility] found him in unsafe living environment with described cat feces on all surfaces in the house and refused to go back out to his home. Pt reported health department officials came out an are threatening to condemn his property 30days    He had between 6-15 cats in and out of the house  He was not taking his insulin because \"no one showed me how\"      I have spoken to ER, ER triage nurse who saw pt on 19,  Ester Rich,  Mary Murrieta, pt's sister (in another state), and hospitalists regarding need for pt to be admitted should he reappear at our facility. ER recently noted much dirt, hair and cigarette butt in his wound. He is a diabetic with no meds, transportation, or resources to gets meds. HE NEEDS TO BE ADMITTED FOR INPT CARE due to failure of outpt management          19 Xray right foot, 3 views  Hx: Redness and swelling of the right foot.  Pain     There is a question of a triangular shaped foreign body along the plantar surface of the right foot   at the level of the third proximal phalanx. There is subcutaneous emphysema, raising possibility for superimposed infection. No fracture or dislocation.     IMPRESSION: Findings suggestive of triangular radiopaque foreign body within the  plantar soft tissues at the level of the third proximal phalanx. There is also  subcutaneous emphysema seen extending from the first through fifth digits at the  level of the proximal phalanges    8/26/19 Arterial duplex  The right and left lower extremity segmental blood pressures are fairly symmetric and unremarkable. KAROLINA:  Right = 1.28              Left = 1.28     RIGHT LOWER EXTREMITY:  Peak systolic velocities-- within normal limits. Normal triphasic waveform throughout.     LEFT LOWER EXTREMITY:  Peak systolic velocities-- within normal limits. Mildly  elevated distal popliteal artery velocity. Normal triphasic waveforms throughout.     ABDOMEN:  No evidence of aneurysm.     IMPRESSION:  No significant arterial stenosis identified.             8/26/19 POD1 right foot debridement, abscess drainage, 5th toe amputation, and FB removal  8/27/19 POD2 No sign arterial stenosis on non-invasive vasc study   8/28/19 POD3 cultures from OR with alpha Strep  8/29/19 POD4 On IV Vanc; daily dressing changes; off-loading; case management working on medicaid and SNF placement  8/30/19 POD5; ON IV ceftriaxone per ID until 9/8/19 8/31/19 POD6; IV ceftriaxone planned until 9/8/19; no weight bearing right foot    9/9/19 Arrived in ER for re-admission        Past Medical History:   Diagnosis Date    Diabetes [facility])      Past Surgical History:   Procedure Laterality Date    HX CHOLECYSTECTOMY       No current facility-administered medications for this encounter. Current Outpatient Medications   Medication Sig    cephALEXin (KEFLEX) 500 mg capsule TK ONE C PO  QID    amLODIPine (NORVASC) 5 mg tablet Take 1 Tab by mouth daily.     insulin glargine (LANTUS) 100 unit/mL injection 10 units subcutaneous daily at night  Indications: type 2 diabetes mellitus    lisinopril (PRINIVIL, ZESTRIL) 40 mg tablet Take 1 Tab by mouth daily. Patient has no known allergies. Social History     Socioeconomic History    Marital status: SINGLE     Spouse name: Not on file    Number of children: Not on file    Years of education: Not on file    Highest education level: Not on file   Tobacco Use    Smoking status: Current Every Day Smoker    Smokeless tobacco: Never Used   Substance and Sexual Activity    Alcohol use: No    Sexual activity: Never     Social History     Tobacco Use   Smoking Status Current Every Day Smoker   Smokeless Tobacco Never Used     History reviewed. No pertinent family history. ROS: The patient has no difficulty with chest pain or shortness of breath. No fever or chills. Comprehensive review of systems was otherwise unremarkable except as noted above. Physical Exam:   Visit Vitals  /70 (BP 1 Location: Left arm, BP Patient Position: At rest)   Pulse 65   Temp 98 °F (36.7 °C)   Resp 16   Ht 5' 9\" (1.753 m)   Wt 210 lb (95.3 kg)   SpO2 96%   BMI 31.01 kg/m²     Vitals:    09/09/19 1806   BP: 104/70   Pulse: 65   Resp: 16   Temp: 98 °F (36.7 °C)   SpO2: 96%   Weight: 210 lb (95.3 kg)   Height: 5' 9\" (1.753 m)     No intake/output data recorded. No intake/output data recorded. Constitutional: Alert, oriented, cooperative patient in no acute distress; appears stated age    Eyes:Sclera are clear. ENMT: no external lesions gross hearing normal; no obvious neck masses, no ear or lip lesions, nares normal  CV: RRR. Normal perfusion  Resp: Breathing is  non-labored; no audible wheezing. GI: soft and non-distended     Musculoskeletal: unremarkable with normal function. No embolic signs or cyanosis. Right 5th toe amputation. Exposed tendons on plantar right foot  Some early granulation appearing in wound bu much biofilm and some debris from suboptimal wound care   No fluctuance;  No crepitus, No necrosis or debris  +edema    Neuro:  Oriented; moves all 4; no focal deficits  Psychiatric: normal affect and mood, no memory impairment                                Recent vitals (if inpt):  Patient Vitals for the past 24 hrs:   BP Temp Pulse Resp SpO2 Height Weight   09/09/19 1806 104/70 98 °F (36.7 °C) 65 16 96 % 5' 9\" (1.753 m) 210 lb (95.3 kg)       Labs:  Recent Labs     09/09/19  1900 09/07/19  2127   WBC 11.2* 12.9*   HGB 12.9* 12.8*    296   NA  --  125*   K  --  5.1   CL  --  92*   CO2  --  30   BUN  --  29*   CREA  --  1.57*   GLU  --  208*   TBILI  --  1.1   SGOT  --  58*   ALT  --  35   AP  --  138*       Lab Results   Component Value Date/Time    WBC 11.2 (H) 09/09/2019 07:00 PM    HGB 12.9 (L) 09/09/2019 07:00 PM    PLATELET 760 98/76/1698 07:00 PM    Sodium 125 (L) 09/07/2019 09:27 PM    Potassium 5.1 09/07/2019 09:27 PM    Chloride 92 (L) 09/07/2019 09:27 PM    CO2 30 09/07/2019 09:27 PM    BUN 29 (H) 09/07/2019 09:27 PM    Creatinine 1.57 (H) 09/07/2019 09:27 PM    Glucose 208 (H) 09/07/2019 09:27 PM    Bilirubin, total 1.1 09/07/2019 09:27 PM    AST (SGOT) 58 (H) 09/07/2019 09:27 PM    ALT (SGPT) 35 09/07/2019 09:27 PM    Alk. phosphatase 138 (H) 09/07/2019 09:27 PM       CT Results  (Last 48 hours)    None        chest X-ray      I reviewed recent labs, recent radiologic studies, and pertinent records including other doctor notes if needed. I independently reviewed radiology images for studies I described above or studies I have ordered.    Admission date (for inpatients): 9/9/2019   * No surgery found *  * No surgery found *    ASSESSMENT/PLAN:  Problem List  Date Reviewed: 8/24/2019          Codes Class Noted    Diabetic infection of right foot ([address] ICD-10-CM: E11.628, L08.9  ICD-9-CM: 250.80, 686.9  8/24/2019        Sepsis ([facility] ICD-10-CM: A41.9  ICD-9-CM: 038.9, 995.91  8/24/2019        Cellulitis of right leg ICD-10-CM: Q70.302  ICD-9-CM: 682.6  8/23/2019        Foreign body (FB) in soft tissue ICD-10-CM: M79.5  ICD-9-CM: 729.6  8/23/2019        Subcutaneous emphysema (HCC) ICD-10-CM: T79. 7XXA  ICD-9-CM: 958.7  8/23/2019        Uncontrolled type II diabetes mellitus ([facility] ICD-10-CM: E11.65  ICD-9-CM: 250.02  8/23/2019        Personal history of noncompliance with medical treatment ICD-10-CM: Z91.19  ICD-9-CM: V15.81  8/23/2019        Hyponatremia ICD-10-CM: E87.1  ICD-9-CM: 276.1  8/23/2019            Active Problems:    * No active hospital problems. *       Diabetic right foot infection - limb threatening  S/p right 5th toe amputation and distal plantar forefoot debridement with extraction of glass (FB)  OR cultures --->gm + cocci with alpha Strep  Defer Abx modification to Hospitalists and ID     He still may lose his right foot  He has proven he is for the outpt setting  I recommended last hospitalization and continue to recommend SNF placement for 1-2 months  He needs to be complete non-weight bearing for right foot   and he won't be compliant with treatment (diabetic, dressings, off-loading, etc...)  if we send him home as he has no family here or support    I have written inpt wound care orders and will check on him periodically. He may require inpt foot debridement      PVD  No significant arterial stenosis noted on non-invasive arterial duplex on 8/26/19    Uncontrolled DM  Inpt diabetic control  Sliding scale and semiquants    Non-Compliance with outpt DM regimen  Encourage compliance  Risks of non-compliance discussed including amputation and early death  He needs SNF            I have personally performed a face-to-face diagnostic evaluation and management  service on this patient. I have independently seen the patient. I have independently obtained the above history from the patient/family. I have independently examined the patient with above findings. I have independently reviewed data/labs for this patient and developed the above plan of care (MDM).   Signed: Toni Mae MD, FACS

## 2019-09-10 NOTE — PROGRESS NOTES
TRANSFER - IN REPORT:    Verbal report received from [facility], RN(name) on Shama Stage  being received from ED(unit) for routine progression of care      Report consisted of patients Situation, Background, Assessment and   Recommendations(SBAR). Information from the following report(s) SBAR, Kardex and MAR was reviewed with the receiving nurse. Opportunity for questions and clarification was provided. Assessment completed upon patients arrival to unit and care assumed.

## 2019-09-11 ENCOUNTER — ANESTHESIA (OUTPATIENT)
Dept: SURGERY | Age: 54
DRG: 624 | End: 2019-09-11

## 2019-09-11 PROBLEM — R46.89 SELF NEGLECT: Status: ACTIVE | Noted: 2019-09-11

## 2019-09-11 LAB
GLUCOSE BLD STRIP.AUTO-MCNC: 116 MG/DL (ref 65–100)
GLUCOSE BLD STRIP.AUTO-MCNC: 131 MG/DL (ref 65–100)
GLUCOSE BLD STRIP.AUTO-MCNC: 148 MG/DL (ref 65–100)
MM INDURATION POC: 0 MM (ref 0–5)
PPD POC: NEGATIVE NEGATIVE

## 2019-09-11 PROCEDURE — 77030011256 HC DRSG MEPILEX <16IN NO BORD MOLN -A: Performed by: SURGERY

## 2019-09-11 PROCEDURE — 77030040361 HC SLV COMPR DVT MDII -B: Performed by: SURGERY

## 2019-09-11 PROCEDURE — 76010000149 HC OR TIME 1 TO 1.5 HR: Performed by: SURGERY

## 2019-09-11 PROCEDURE — 77030013055: Performed by: SURGERY

## 2019-09-11 PROCEDURE — 74011250636 HC RX REV CODE- 250/636: Performed by: HOSPITALIST

## 2019-09-11 PROCEDURE — 77030002916 HC SUT ETHLN J&J -A: Performed by: SURGERY

## 2019-09-11 PROCEDURE — 82962 GLUCOSE BLOOD TEST: CPT

## 2019-09-11 PROCEDURE — 77030018836 HC SOL IRR NACL ICUM -A: Performed by: SURGERY

## 2019-09-11 PROCEDURE — 74011000250 HC RX REV CODE- 250: Performed by: SURGERY

## 2019-09-11 PROCEDURE — 96361 HYDRATE IV INFUSION ADD-ON: CPT

## 2019-09-11 PROCEDURE — 74011250637 HC RX REV CODE- 250/637: Performed by: ANESTHESIOLOGY

## 2019-09-11 PROCEDURE — 76060000033 HC ANESTHESIA 1 TO 1.5 HR: Performed by: SURGERY

## 2019-09-11 PROCEDURE — 77030020263 HC SOL INJ SOD CL0.9% LFCR 1000ML

## 2019-09-11 PROCEDURE — 74011250636 HC RX REV CODE- 250/636

## 2019-09-11 PROCEDURE — 99218 HC RM OBSERVATION: CPT

## 2019-09-11 PROCEDURE — 74011000250 HC RX REV CODE- 250

## 2019-09-11 PROCEDURE — 74011000258 HC RX REV CODE- 258: Performed by: HOSPITALIST

## 2019-09-11 PROCEDURE — 74011250636 HC RX REV CODE- 250/636: Performed by: ANESTHESIOLOGY

## 2019-09-11 PROCEDURE — 77030019940 HC BLNKT HYPOTHRM STRY -B: Performed by: ANESTHESIOLOGY

## 2019-09-11 PROCEDURE — 77030008462 HC STPLR SKN PROX J&J -A: Performed by: SURGERY

## 2019-09-11 PROCEDURE — 76210000016 HC OR PH I REC 1 TO 1.5 HR: Performed by: SURGERY

## 2019-09-11 PROCEDURE — 77030010509 HC AIRWY LMA MSK TELE -A: Performed by: ANESTHESIOLOGY

## 2019-09-11 RX ORDER — SODIUM CHLORIDE, SODIUM LACTATE, POTASSIUM CHLORIDE, CALCIUM CHLORIDE 600; 310; 30; 20 MG/100ML; MG/100ML; MG/100ML; MG/100ML
75 INJECTION, SOLUTION INTRAVENOUS CONTINUOUS
Status: DISCONTINUED | OUTPATIENT
Start: 2019-09-11 | End: 2019-09-11 | Stop reason: HOSPADM

## 2019-09-11 RX ORDER — ONDANSETRON 2 MG/ML
INJECTION INTRAMUSCULAR; INTRAVENOUS AS NEEDED
Status: DISCONTINUED | OUTPATIENT
Start: 2019-09-11 | End: 2019-09-11 | Stop reason: HOSPADM

## 2019-09-11 RX ORDER — BUPIVACAINE HYDROCHLORIDE 2.5 MG/ML
INJECTION, SOLUTION EPIDURAL; INFILTRATION; INTRACAUDAL AS NEEDED
Status: DISCONTINUED | OUTPATIENT
Start: 2019-09-11 | End: 2019-09-11 | Stop reason: HOSPADM

## 2019-09-11 RX ORDER — OXYCODONE HYDROCHLORIDE 5 MG/1
10 TABLET ORAL
Status: DISCONTINUED | OUTPATIENT
Start: 2019-09-11 | End: 2019-09-11 | Stop reason: HOSPADM

## 2019-09-11 RX ORDER — HYDRALAZINE HYDROCHLORIDE 20 MG/ML
10 INJECTION INTRAMUSCULAR; INTRAVENOUS
Status: COMPLETED | OUTPATIENT
Start: 2019-09-11 | End: 2019-09-13

## 2019-09-11 RX ORDER — NALOXONE HYDROCHLORIDE 0.4 MG/ML
0.1 INJECTION, SOLUTION INTRAMUSCULAR; INTRAVENOUS; SUBCUTANEOUS AS NEEDED
Status: DISCONTINUED | OUTPATIENT
Start: 2019-09-11 | End: 2019-09-11 | Stop reason: HOSPADM

## 2019-09-11 RX ORDER — EPHEDRINE SULFATE 50 MG/ML
INJECTION, SOLUTION INTRAVENOUS AS NEEDED
Status: DISCONTINUED | OUTPATIENT
Start: 2019-09-11 | End: 2019-09-11 | Stop reason: HOSPADM

## 2019-09-11 RX ORDER — LIDOCAINE HYDROCHLORIDE 20 MG/ML
INJECTION, SOLUTION EPIDURAL; INFILTRATION; INTRACAUDAL; PERINEURAL AS NEEDED
Status: DISCONTINUED | OUTPATIENT
Start: 2019-09-11 | End: 2019-09-11 | Stop reason: HOSPADM

## 2019-09-11 RX ORDER — ONDANSETRON 2 MG/ML
4 INJECTION INTRAMUSCULAR; INTRAVENOUS ONCE
Status: DISCONTINUED | OUTPATIENT
Start: 2019-09-11 | End: 2019-09-11 | Stop reason: HOSPADM

## 2019-09-11 RX ORDER — LISINOPRIL 5 MG/1
10 TABLET ORAL ONCE
Status: COMPLETED | OUTPATIENT
Start: 2019-09-11 | End: 2019-09-11

## 2019-09-11 RX ORDER — FENTANYL CITRATE 50 UG/ML
INJECTION, SOLUTION INTRAMUSCULAR; INTRAVENOUS AS NEEDED
Status: DISCONTINUED | OUTPATIENT
Start: 2019-09-11 | End: 2019-09-11 | Stop reason: HOSPADM

## 2019-09-11 RX ORDER — PROPOFOL 10 MG/ML
INJECTION, EMULSION INTRAVENOUS AS NEEDED
Status: DISCONTINUED | OUTPATIENT
Start: 2019-09-11 | End: 2019-09-11 | Stop reason: HOSPADM

## 2019-09-11 RX ORDER — SODIUM CHLORIDE, SODIUM LACTATE, POTASSIUM CHLORIDE, CALCIUM CHLORIDE 600; 310; 30; 20 MG/100ML; MG/100ML; MG/100ML; MG/100ML
INJECTION, SOLUTION INTRAVENOUS
Status: DISCONTINUED | OUTPATIENT
Start: 2019-09-11 | End: 2019-09-11 | Stop reason: HOSPADM

## 2019-09-11 RX ORDER — HYDROMORPHONE HYDROCHLORIDE 2 MG/ML
0.5 INJECTION, SOLUTION INTRAMUSCULAR; INTRAVENOUS; SUBCUTANEOUS
Status: DISCONTINUED | OUTPATIENT
Start: 2019-09-11 | End: 2019-09-11 | Stop reason: HOSPADM

## 2019-09-11 RX ORDER — DIPHENHYDRAMINE HYDROCHLORIDE 50 MG/ML
12.5 INJECTION, SOLUTION INTRAMUSCULAR; INTRAVENOUS ONCE
Status: DISCONTINUED | OUTPATIENT
Start: 2019-09-11 | End: 2019-09-11 | Stop reason: HOSPADM

## 2019-09-11 RX ORDER — ACETAMINOPHEN 500 MG
500 TABLET ORAL ONCE
Status: DISCONTINUED | OUTPATIENT
Start: 2019-09-11 | End: 2019-09-11 | Stop reason: HOSPADM

## 2019-09-11 RX ORDER — OXYCODONE HYDROCHLORIDE 5 MG/1
5 TABLET ORAL
Status: DISCONTINUED | OUTPATIENT
Start: 2019-09-11 | End: 2019-09-11 | Stop reason: HOSPADM

## 2019-09-11 RX ADMIN — HEPARIN SODIUM 5000 UNITS: 5000 INJECTION INTRAVENOUS; SUBCUTANEOUS at 22:18

## 2019-09-11 RX ADMIN — FENTANYL CITRATE 25 MCG: 50 INJECTION, SOLUTION INTRAMUSCULAR; INTRAVENOUS at 16:16

## 2019-09-11 RX ADMIN — PROPOFOL 200 MG: 10 INJECTION, EMULSION INTRAVENOUS at 15:54

## 2019-09-11 RX ADMIN — Medication 5 ML: at 22:22

## 2019-09-11 RX ADMIN — FENTANYL CITRATE 50 MCG: 50 INJECTION, SOLUTION INTRAMUSCULAR; INTRAVENOUS at 16:58

## 2019-09-11 RX ADMIN — CEFTRIAXONE 1 G: 1 INJECTION, POWDER, FOR SOLUTION INTRAMUSCULAR; INTRAVENOUS at 23:45

## 2019-09-11 RX ADMIN — EPHEDRINE SULFATE 10 MG: 50 INJECTION, SOLUTION INTRAVENOUS at 16:11

## 2019-09-11 RX ADMIN — SODIUM CHLORIDE 100 ML/HR: 900 INJECTION, SOLUTION INTRAVENOUS at 10:59

## 2019-09-11 RX ADMIN — SODIUM CHLORIDE, SODIUM LACTATE, POTASSIUM CHLORIDE, CALCIUM CHLORIDE: 600; 310; 30; 20 INJECTION, SOLUTION INTRAVENOUS at 15:50

## 2019-09-11 RX ADMIN — LIDOCAINE HYDROCHLORIDE 60 MG: 20 INJECTION, SOLUTION EPIDURAL; INFILTRATION; INTRACAUDAL; PERINEURAL at 15:54

## 2019-09-11 RX ADMIN — ONDANSETRON 4 MG: 2 INJECTION INTRAMUSCULAR; INTRAVENOUS at 16:03

## 2019-09-11 RX ADMIN — LISINOPRIL 10 MG: 5 TABLET ORAL at 18:31

## 2019-09-11 RX ADMIN — HYDRALAZINE HYDROCHLORIDE 10 MG: 20 INJECTION INTRAMUSCULAR; INTRAVENOUS at 18:19

## 2019-09-11 RX ADMIN — HEPARIN SODIUM 5000 UNITS: 5000 INJECTION INTRAVENOUS; SUBCUTANEOUS at 06:02

## 2019-09-11 RX ADMIN — FENTANYL CITRATE 25 MCG: 50 INJECTION, SOLUTION INTRAMUSCULAR; INTRAVENOUS at 16:27

## 2019-09-11 NOTE — PROGRESS NOTES
Hospitalist Progress Note    Patient: Izzy Gant MRN: [id number]  SSN: [id number]    YOB: 1965  Age: 49 y.o. Sex: male      Admit Date: 9/9/2019    LOS: 0 days     Subjective:     49year old male with poorly controlled DM2 and a right foot diabetic wound readmitted due to continued infection despite being on IV Ceftriaxone at home for a few days prior to \"losing\" his PICC line. 9/11 - States his foot feels better. Hungry from being NPO. Denies CP/SOB. Denies F/C/N/V. Review of systems negative except stated above. Objective:     Visit Vitals  /89 (BP 1 Location: Left arm, BP Patient Position: At rest;Supine)   Pulse (!) 58   Temp 97.4 °F (36.3 °C)   Resp 18   Ht 5' 9\" (1.753 m)   Wt 95.3 kg (210 lb)   SpO2 96%   BMI 31.01 kg/m²      Oxygen Therapy  O2 Sat (%): 96 % (09/11/19 0746)  O2 Device: Room air (09/11/19 0830)      Intake and Output:     Intake/Output Summary (Last 24 hours) at 9/11/2019 1222  Last data filed at 9/11/2019 0746  Gross per 24 hour   Intake --   Output 550 ml   Net -550 ml         Physical Exam:   GENERAL: alert, cooperative, no distress, appears stated age  EYE: conjunctivae/corneas clear. PERRL. THROAT & NECK: normal and no erythema or exudates noted. LUNG: clear to auscultation bilaterally  HEART: regular rate and rhythm, S1S2, no murmur, no JVD  ABDOMEN: soft, non-tender, non-distended. Bowel sounds normal.   EXTREMITIES:  Right foot with extensive wound with serous drainage  SKIN: Right foot with extensive wound with serous drainage  NEUROLOGIC: A&Ox3. Cranial nerves 2-12 grossly intact.     Lab/Data Review:  Recent Results (from the past 24 hour(s))   GLUCOSE, POC    Collection Time: 09/10/19 12:49 PM   Result Value Ref Range    Glucose (POC) 222 (H) 65 - 100 mg/dL   GLUCOSE, POC    Collection Time: 09/10/19  4:43 PM   Result Value Ref Range    Glucose (POC) 124 (H) 65 - 100 mg/dL   URINALYSIS W/ RFLX MICROSCOPIC    Collection Time: 09/10/19  7:56 PM Result Value Ref Range    Color YELLOW      Appearance CLEAR      Specific gravity 1.014 1.001 - 1.023      pH (UA) 5.5 5.0 - 9.0      Protein NEGATIVE  NEG mg/dL    Glucose NEGATIVE  mg/dL    Ketone NEGATIVE  NEG mg/dL    Bilirubin NEGATIVE  NEG      Blood NEGATIVE  NEG      Urobilinogen 0.2 0.2 - 1.0 EU/dL    Nitrites NEGATIVE  NEG      Leukocyte Esterase NEGATIVE  NEG     GLUCOSE, POC    Collection Time: 09/10/19  9:15 PM   Result Value Ref Range    Glucose (POC) 154 (H) 65 - 100 mg/dL   PLEASE READ & DOCUMENT PPD TEST IN 24 HRS    Collection Time: 09/10/19 11:55 PM   Result Value Ref Range    PPD Negative Negative    mm Induration 0 0 - 5 mm   GLUCOSE, POC    Collection Time: 09/11/19  7:25 AM   Result Value Ref Range    Glucose (POC) 131 (H) 65 - 100 mg/dL   GLUCOSE, POC    Collection Time: 09/11/19 11:44 AM   Result Value Ref Range    Glucose (POC) 116 (H) 65 - 100 mg/dL       Imaging:  Xr Foot Rt Min 3 V    Result Date: 9/9/2019  RIGHT FOOT SERIES HISTORY: Pain COMPARISON: August 23, 2019 FINDINGS: The fifth toe has been amputated. There is no displaced fracture, malalignment or osseous erosions. IMPRESSION: Fifth toe amputation. Xr Foot Rt Min 3 V    Result Date: 8/23/2019  History: Redness and swelling of the right foot. Pain EXAM: 3 views right foot FINDINGS: There is a question of a triangular shaped foreign body along the plantar surface of the right foot at the level of the third proximal phalanx. There is subcutaneous emphysema, raising possibility for superimposed infection. No fracture or dislocation. IMPRESSION: Findings suggestive of triangular radiopaque foreign body within the plantar soft tissues at the level of the third proximal phalanx. There is also subcutaneous emphysema seen extending from the first through fifth digits at the level of the proximal phalanges.     Mri Foot Rt W Wo Cont    Result Date: 8/26/2019  HISTORY: Recent fifth toe amputation and foreign body removal. EXAM: MRI right foot with and without contrast TECHNIQUE: Multiplanar multisequence imaging is performed both before and after the uneventful administration of 20 mL Dotarem. COMPARISON: Plain films dated 8/23/2019 FINDINGS: The exam is compromised by patient motion. There is a large soft tissue defect seen along the plantar surface of the right foot. Findings compatible with the patient's given clinical history of recent surgery. The patient is status post interval removal of the fifth digit at the level of the MTP articulation. No evidence of osteomyelitis or abscess. There is edema in the plantar musculature of the right foot. IMPRESSION: 1. Large soft tissue defect with postsurgical change as described. 2. No evidence of abscess or osteomyelitis. 3. Plantar muscular edema. Findings compatible with chronic denervation change. Duplex Low Ext Arteries With Karolina    Result Date: 8/26/2019  TITLE: Lower extremity arterial ultrasound examination. INDICATION: Uncontrolled diabetes. Diabetic foot infection. TECHNIQUE: Grayscale, color, and Doppler interrogation performed. The entire length of all of the arteries of the extremity were evaluated. COMPARISON: None. SEGMENTAL BP:  The right and left lower extremity segmental blood pressures are fairly symmetric and unremarkable. KAROLINA:  Right = 1.28            Left = 1.28 RIGHT LOWER EXTREMITY:  Peak systolic velocities-- within normal limits. Normal triphasic waveform throughout. LEFT LOWER EXTREMITY:  Peak systolic velocities-- within normal limits. Mildly elevated distal popliteal artery velocity. Normal triphasic waveforms throughout. ABDOMEN:  No evidence of aneurysm. IMPRESSION:  No significant arterial stenosis identified. No results found for this visit on 09/09/19. Cultures:   All Micro Results     None          Assessment/Plan:     Principal Problem:    Diabetic ulcer of right foot associated with type 2 diabetes mellitus, with necrosis of muscle [facility]) (8/24/2019)  - To go to OR for skin graft today  - Continue Ceftriaxone based on previous cultures  - Appreciate general surgery's input and assistance    Active Problems:    Diabetic foot infection ([facility] (9/9/2019)  - Continue Ceftriaxone based on previous cultures  - Appreciate general surgery's input and assistance      Leukocytosis (9/9/2019)  - Resolved      Uncontrolled type II diabetes mellitus ([facility] (8/23/2019)  - A1C 8.2  - Blood sugars mildly elevated  - Stop Lantus 10U  - Start Glyburide/Metformin (on [employer] $4 list)  - Continue Humalog SSI  - Patient requesting PO meds because he does not want to take insulin      Medically noncompliant (9/9/2019)    Dispo - To OR today. Would benefit from placement at discharge if possible.     DIET NPO    DVT Prophylaxis: Heparin      Signed By: Eli Pierce DO     September 11, 2019

## 2019-09-11 NOTE — DIABETES MGMT
Patient admitted 9/10/19 with infected right foot. Blood glucose on admission 210. HbA1c 8.2 (eA). Patient states that he went home and was supposed to have home health nursing, but states that he was told that his home conditions were not good. Per chart review from home health visit: the porch, hallways, kitchen and dining room table were all covered in cat feces and urine and there was a foul odor, old food and unsafe environment. Pt states that he has numerous cats. Pt states that he was walking on his foot at home and that he never opened up the vial of insulin at home. Pt states that he did take his blood pressure medication and antibiotics. Pt states, \"I do not want to take insulin shots\". He attempted to check his blood glucose one time, but it read error and he did not try again. Discussed the importance of good blood glucose control for wound healing and the relationship between hyperglycemia and infection. Pt given a Contour Next Meter and instructed in it's use. Pt was able to return demonstrate correct use of meter and stated that he doesn't understand why he had trouble when he tried at home. Pt states that he only tried one time. Discussed target goals for blood glucose and HbA1c.   Educated pt re: current basal/bolus regimen of Lantus 10 units hs and Humalog sliding scale coverage 4x/day ac and hs, including type of insulin, timing with meals, onset, duration of effect, and peak of insulin dose. Pt will need review and reinforcement. Plan per discussion with hospitalist, is to discontinue Lantus and start Glucovance 2.5 mg/500mg daily with breakfast starting tomorrow. Pt is currently NPO for surgery later today. Discussed Glucovance with pt, including how it works, how it should be taken and possible side effects. Pt will need review and reinforcement of all medications.  Pt states that he has used Metformin in the past, but he could not afford to go back to MD appointments and get his prescriptions renewed. Pt instructed re: preparation and injection of insulin dose using the vials and syringe method in case it is needed for blood glucose control. Pt is agreeable to instruction. Pt return demonstrated proper insulin injection technique using injection model. Nursing will continue to have pt practice insulin self-injection when insulin dose is due and document in chart. Pt also verbalizes understanding of site rotation, storage of insulin, and proper sharps disposal. Pt states that he has used insulin syringes in the past, but it was many years ago. Stressed the importance of follow up care for diabetes management with PCP. Discussed the importance of medical compliance regarding wound care and diabetes medication regimen, monitoring blood glucose and recording in log book to bring to PCP appointments to assist with medication titration, and following a consistent carbohydrate diet. Would recommend home health nursing care for further diabetes education and supervision of diabetes regimen. Case management assisting with discharge planning. Pt has no further questions at this time.

## 2019-09-11 NOTE — PROGRESS NOTES
H&P/Consult Note/Progress Note/Office Note:   Martin Ernandez  MRN: [id number]  :1965  Age:54 y.o.    HPI: Martin Ernandez is a 49 y.o. male who was admitted on 19 from the ER by the Hospitalists with a diabetic right foot infection. He is s/p multiple right foot abscess drainages, removal of glass foreign body and right 5th toe amputation on 19. Prior to admission he reported progressive, constant, severe right foot pain for at least 2 days. He was walking barefoot. He had associated low grade fever. He was placed on IV Abx by Hospitalists  He has h/o DM and non-compliance (No primary care doctor, not on DM meds)  Bld Cxs from admission grew gm + cocci; Wound cultures from [place] grew pan-sensitive Strep and Staph Aureus   Xray as below    Surgery recommended inpt care or SNF but he had no insurance   helped him apply for medicaid  He was discharged home with home health. [facility] found him in unsafe living environment with described cat feces on all surfaces in the house and refused to go back out to his home. Pt reported health department officials came out an are threatening to condemn his property 30days    He had between 6-15 cats in and out of the house  He was not taking his insulin because \"no one showed me how\"      I have spoken to ER, ER triage nurse who saw pt on 19,  Demond Murillo,  Rina, pt's sister (in another state), and hospitalists regarding need for pt to be admitted should he reappear at our facility. ER recently noted much dirt, hair and cigarette butt in his wound. He is a diabetic with no meds, transportation, or resources to gets meds. HE NEEDS TO BE ADMITTED FOR INPT CARE due to failure of outpt management          19 Xray right foot, 3 views  Hx: Redness and swelling of the right foot.  Pain     There is a question of a triangular shaped foreign body along the plantar surface of the right foot   at the level of the third proximal phalanx. There is subcutaneous emphysema, raising possibility for superimposed infection. No fracture or dislocation.     IMPRESSION: Findings suggestive of triangular radiopaque foreign body within the  plantar soft tissues at the level of the third proximal phalanx. There is also  subcutaneous emphysema seen extending from the first through fifth digits at the  level of the proximal phalanges    8/26/19 Arterial duplex  The right and left lower extremity segmental blood pressures are fairly symmetric and unremarkable. KAROLINA:  Right = 1.28              Left = 1.28     RIGHT LOWER EXTREMITY:  Peak systolic velocities-- within normal limits. Normal triphasic waveform throughout.     LEFT LOWER EXTREMITY:  Peak systolic velocities-- within normal limits. Mildly  elevated distal popliteal artery velocity.  Normal triphasic waveforms throughout.     ABDOMEN:  No evidence of aneurysm.     IMPRESSION:  No significant arterial stenosis identified.             8/26/19 POD1 right foot debridement, abscess drainage, 5th toe amputation, and FB removal  8/27/19 POD2 No sign arterial stenosis on non-invasive vasc study   8/28/19 POD3 cultures from OR with alpha Strep  8/29/19 POD4 On IV Vanc; daily dressing changes; off-loading; case management working on medicaid and SNF placement  8/30/19 POD5; ON IV ceftriaxone per ID until 9/8/19 8/31/19 POD6; IV ceftriaxone planned until 9/8/19; no weight bearing right foot    9/9/19 Arrived in ER for re-admission  9/11/19 OR today for STSG        Past Medical History:   Diagnosis Date    Diabetes [facility])      Past Surgical History:   Procedure Laterality Date    HX CHOLECYSTECTOMY       Current Facility-Administered Medications   Medication Dose Route Frequency    dextrose 40% (GLUTOSE) oral gel 1 Tube  15 g Oral PRN    glucagon (GLUCAGEN) injection 1 mg  1 mg IntraMUSCular PRN    dextrose (D50W) injection syrg 12.5-25 g  25-50 mL IntraVENous PRN    insulin lispro (HUMALOG) injection   SubCUTAneous AC&HS    sodium chloride (NS) flush 5-40 mL  5-40 mL IntraVENous Q8H    sodium chloride (NS) flush 5-40 mL  5-40 mL IntraVENous PRN    acetaminophen (TYLENOL) tablet 650 mg  650 mg Oral Q4H PRN    0.9% sodium chloride infusion  100 mL/hr IntraVENous CONTINUOUS    ondansetron (ZOFRAN) injection 4 mg  4 mg IntraVENous Q4H PRN    heparin (porcine) injection 5,000 Units  5,000 Units SubCUTAneous Q8H    bisacodyl (DULCOLAX) tablet 5 mg  5 mg Oral DAILY PRN    diphenhydrAMINE (BENADRYL) capsule 25 mg  25 mg Oral Q6H PRN    cefTRIAXone (ROCEPHIN) 1 g in 0.9% sodium chloride (MBP/ADV) 50 mL  1 g IntraVENous Q24H    insulin glargine (LANTUS) injection 10 Units  10 Units SubCUTAneous QHS     Patient has no known allergies. Social History     Socioeconomic History    Marital status: SINGLE     Spouse name: Not on file    Number of children: Not on file    Years of education: Not on file    Highest education level: Not on file   Tobacco Use    Smoking status: Current Every Day Smoker    Smokeless tobacco: Never Used   Substance and Sexual Activity    Alcohol use: No    Sexual activity: Never     Social History     Tobacco Use   Smoking Status Current Every Day Smoker   Smokeless Tobacco Never Used     History reviewed. No pertinent family history. ROS: The patient has no difficulty with chest pain or shortness of breath. No fever or chills. Comprehensive review of systems was otherwise unremarkable except as noted above.     Physical Exam:   Visit Vitals  /71 (BP 1 Location: Right arm)   Pulse 60   Temp 97.7 °F (36.5 °C)   Resp 14   Ht 5' 9\" (1.753 m)   Wt 210 lb (95.3 kg)   SpO2 96%   BMI 31.01 kg/m²     Vitals:    09/10/19 2110 09/11/19 0020 09/11/19 0050 09/11/19 0348   BP: 147/77 170/80 156/77 155/71   Pulse: 65 62  60   Resp: 18 18  14   Temp: 98 °F (36.7 °C) 97.8 °F (36.6 °C)  97.7 °F (36.5 °C)   SpO2: 95% 96%     Weight:       Height:         No intake/output data recorded. No intake/output data recorded. Constitutional: Alert, oriented, cooperative patient in no acute distress; appears stated age    Eyes:Sclera are clear. ENMT: no external lesions gross hearing normal; no obvious neck masses, no ear or lip lesions, nares normal  CV: RRR. Normal perfusion  Resp: Breathing is  non-labored; no audible wheezing. GI: soft and non-distended     Musculoskeletal: unremarkable with normal function. No embolic signs or cyanosis. Right 5th toe amputation. Exposed tendons on plantar right foot  Some early granulation appearing in wound bu much biofilm and some debris from suboptimal wound care   No fluctuance;  No crepitus, No necrosis or debris  +edema    Neuro:  Oriented; moves all 4; no focal deficits  Psychiatric: normal affect and mood, no memory impairment                                Recent vitals (if inpt):  Patient Vitals for the past 24 hrs:   BP Temp Pulse Resp SpO2   09/11/19 0348 155/71 97.7 °F (36.5 °C) 60 14 --   09/11/19 0050 156/77 -- -- -- --   09/11/19 0020 170/80 97.8 °F (36.6 °C) 62 18 96 %   09/10/19 2110 147/77 98 °F (36.7 °C) 65 18 95 %   09/10/19 1600 154/80 97.8 °F (36.6 °C) 68 18 94 %   09/10/19 1238 144/79 97.8 °F (36.6 °C) 68 18 96 %   09/10/19 0800 146/76 97.5 °F (36.4 °C) 60 18 98 %       Labs:  Recent Labs     09/10/19  0456 09/09/19 2024   WBC 8.0  --    HGB 11.4*  --      --     134*   K 4.4 4.9    99   CO2 29 27   BUN 23 26*   CREA 1.26 1.52*   * 187*   TBILI  --  0.7   SGOT  --  25   ALT  --  31   AP  --  112       Lab Results   Component Value Date/Time    WBC 8.0 09/10/2019 04:56 AM    HGB 11.4 (L) 09/10/2019 04:56 AM    PLATELET 344 93/20/5864 04:56 AM    Sodium 136 09/10/2019 04:56 AM    Potassium 4.4 09/10/2019 04:56 AM    Chloride 102 09/10/2019 04:56 AM    CO2 29 09/10/2019 04:56 AM    BUN 23 09/10/2019 04:56 AM    Creatinine 1.26 09/10/2019 04:56 AM    Glucose 145 (H) 09/10/2019 04:56 AM Bilirubin, total 0.7 09/09/2019 08:24 PM    AST (SGOT) 25 09/09/2019 08:24 PM    ALT (SGPT) 31 09/09/2019 08:24 PM    Alk. phosphatase 112 09/09/2019 08:24 PM       CT Results  (Last 48 hours)    None        chest X-ray      I reviewed recent labs, recent radiologic studies, and pertinent records including other doctor notes if needed. I independently reviewed radiology images for studies I described above or studies I have ordered. Admission date (for inpatients): 9/9/2019   * No surgery found *  Procedure(s):  SPLIT THICKNESS SKIN GRAFT FROM RIGHT THIGH TO RIGHT FOOT ROOM 343    ASSESSMENT/PLAN:  Problem List  Date Reviewed: 9/9/2019          Codes Class Noted    Leukocytosis ICD-10-CM: I25.031  ICD-9-CM: 288.60  9/9/2019        Medically noncompliant ICD-10-CM: Z91.19  ICD-9-CM: V15.81  9/9/2019        Open wound of right foot ICD-10-CM: U14.033Q  ICD-9-CM: 892.0  9/9/2019        Diabetic foot infection ([facility] ICD-10-CM: E11.628, L08.9  ICD-9-CM: 250.80, 686.9  9/9/2019        * (Principal) Diabetic ulcer of right foot associated with type 2 diabetes mellitus, with necrosis of muscle ([facility] ICD-10-CM: E11.621, L97.513  ICD-9-CM: 250.80, 707.15  8/24/2019        Sepsis ([facility] ICD-10-CM: A41.9  ICD-9-CM: 038.9, 995.91  8/24/2019        Cellulitis of right leg ICD-10-CM: L03.115  ICD-9-CM: 682.6  8/23/2019        Foreign body (FB) in soft tissue ICD-10-CM: M79.5  ICD-9-CM: 729.6  8/23/2019        Subcutaneous emphysema ([facility] ICD-10-CM: T79. 7XXA  ICD-9-CM: 958.7  8/23/2019        Uncontrolled type II diabetes mellitus ([facility] ICD-10-CM: E11.65  ICD-9-CM: 250.02  8/23/2019        Personal history of noncompliance with medical treatment ICD-10-CM: Z91.19  ICD-9-CM: V15.81  8/23/2019        Hyponatremia ICD-10-CM: E87.1  ICD-9-CM: 276.1  8/23/2019            Principal Problem:    Diabetic ulcer of right foot associated with type 2 diabetes mellitus, with necrosis of muscle ([facility] (8/24/2019)    Active Problems: Uncontrolled type II diabetes mellitus ([facility] (8/23/2019)      Leukocytosis (9/9/2019)      Medically noncompliant (9/9/2019)      Diabetic foot infection ([facility] (9/9/2019)         Diabetic right foot infection - limb threatening  Large open plantar wound  S/p right 5th toe amputation and distal plantar forefoot debridement with extraction of glass (FB)  OR cultures --->gm + cocci with alpha Strep  Defer Abx modification to Hospitalists and ID     We decided to proceed today with STSG from right thigh to right foot wound in an effort to heal this DFU faster  Informed consent obtained    He needs to be complete non-weight bearing for right foot     Recommend SNF placement for 1-2 months  He failed outpt management and has demonstrated he won't be compliant with outpt Rx at home (diabetic, dressings, off-loading, etc...)  He lives alone without support in a home that the health department is threatening to condmenn in approx 25 days      PVD  No significant arterial stenosis noted on non-invasive arterial duplex on 8/26/19    Uncontrolled DM  Inpt diabetic control  Sliding scale and semiquants    Non-Compliance with outpt DM regimen  Encourage compliance  Risks of non-compliance discussed including amputation and early death  He needs SNF            I have personally performed a face-to-face diagnostic evaluation and management  service on this patient. I have independently seen the patient. I have independently obtained the above history from the patient/family. I have independently examined the patient with above findings. I have independently reviewed data/labs for this patient and developed the above plan of care (MDM). Signed: Froy Wild.  Jon Carlson MD, FACS

## 2019-09-11 NOTE — PERIOP NOTES
TRANSFER - IN REPORT:    Verbal report received from Chris Wells on Martin Ernandez  being received from 3rd floor med surg for routine progression of care      Report consisted of patients Situation, Background, Assessment and   Recommendations(SBAR). Information from the following report(s) Kardex and MAR was reviewed with the receiving nurse. Opportunity for questions and clarification was provided. Assessment completed upon patients arrival to unit and care assumed.

## 2019-09-11 NOTE — BRIEF OP NOTE
BRIEF OPERATIVE NOTE    Date of Procedure:  9/11/2019    Preoperative Diagnosis: RIGHT FOOT DIABETIC WOUND  Postoperative Diagnosis: same    Procedure:   Surgical wound preparation for grafting - right foot  Autologous STSG right thigh to right foot  Autologous dermal graft over flexor tendon right 4th toe  Advancment flap closure of right thigh graft harvest site     Surgeon(s) and Role:     * Freeman Wood MD - Primary  Anesthesia: General  Estimated Blood Loss: <30cc  Specimens: * No specimens in log *  Findings: see op note   Complications: none  Implants: * No implants in log *

## 2019-09-11 NOTE — PROGRESS NOTES
TRANSFER - OUT REPORT:    Verbal report given to Catrachito Desai RN on Kelly Hamilton  being transferred to [facility] for routine post - op       Report consisted of patients Situation, Background, Assessment and   Recommendations(SBAR). Information from the following report(s) SBAR, Kardex, OR Summary, Intake/Output and MAR was reviewed with the receiving nurse. Lines:   Peripheral IV 09/09/19 Left Hand (Active)   Site Assessment Clean, dry, & intact 9/11/2019  5:13 PM   Phlebitis Assessment 0 9/11/2019  5:13 PM   Infiltration Assessment 0 9/11/2019  5:13 PM   Dressing Status Clean, dry, & intact 9/11/2019  5:13 PM   Dressing Type Transparent;Tape 9/11/2019  5:13 PM   Hub Color/Line Status Infusing;Pink 9/11/2019  5:13 PM   Action Taken Blood drawn 9/9/2019  7:10 PM        Opportunity for questions and clarification was provided.       Patient transported with:   [employer]

## 2019-09-11 NOTE — PROGRESS NOTES
TRANSFER - OUT REPORT:    Verbal report given to Jane RN(name) on John Hartman  being transferred to Pre OP(unit) for ordered procedure       Report consisted of patients Situation, Background, Assessment and   Recommendations(SBAR). Information from the following report(s) SBAR, Kardex, Intake/Output, MAR and Recent Results was reviewed with the receiving nurse. Lines:   Peripheral IV 09/09/19 Left Hand (Active)   Site Assessment Clean, dry, & intact 9/11/2019  8:30 AM   Phlebitis Assessment 0 9/11/2019  8:30 AM   Infiltration Assessment 0 9/11/2019  8:30 AM   Dressing Status Clean, dry, & intact 9/11/2019  8:30 AM   Dressing Type Tape;Transparent 9/11/2019  8:30 AM   Hub Color/Line Status Infusing 9/11/2019  8:30 AM   Action Taken Blood drawn 9/9/2019  7:10 PM        Opportunity for questions and clarification was provided.       Patient transported with:   [a][employer]

## 2019-09-11 NOTE — PROGRESS NOTES
Pt assessment completed. Alert and oriented. Lungs CTA even and unlabored. Heart sounds regular. Abdomen soft and non tender with active bowel sounds. IV present and infusing without difficulty. Right plantar foot wound dressing changed, wet to dry per MD order. Pt tolerated well. All needs met at this time.

## 2019-09-11 NOTE — ANESTHESIA PREPROCEDURE EVALUATION
Relevant Problems   No relevant active problems       Anesthetic History   No history of anesthetic complications            Review of Systems / Medical History  Patient summary reviewed and pertinent labs reviewed    Pulmonary          Smoker         Neuro/Psych   Within defined limits           Cardiovascular                  Exercise tolerance: >4 METS     GI/Hepatic/Renal                Endo/Other    Diabetes: well controlled, type 2    Obesity     Other Findings   Comments: Foreign body right foot; infection           Physical Exam    Airway  Mallampati: II  TM Distance: 4 - 6 cm  Neck ROM: normal range of motion   Mouth opening: Normal     Cardiovascular  Regular rate and rhythm,  S1 and S2 normal,  no murmur, click, rub, or gallop  Rhythm: regular  Rate: normal         Dental  No notable dental hx       Pulmonary  Breath sounds clear to auscultation               Abdominal  Abdominal exam normal       Other Findings            Anesthetic Plan    ASA: 3  Anesthesia type: general          Induction: Intravenous  Anesthetic plan and risks discussed with: Patient

## 2019-09-11 NOTE — PROGRESS NOTES
Problem: Diabetes Self-Management  Goal: *Disease process and treatment process  Description  Define diabetes and identify own type of diabetes; list 3 options for treating diabetes. Outcome: Progressing Towards Goal  Goal: *Incorporating nutritional management into lifestyle  Description  Describe effect of type, amount and timing of food on blood glucose; list 3 methods for planning meals. Outcome: Progressing Towards Goal  Goal: *Incorporating physical activity into lifestyle  Description  State effect of exercise on blood glucose levels. Outcome: Progressing Towards Goal  Goal: *Using medications safely  Description  State effect of diabetes medications on diabetes; name diabetes medication taking, action and side effects. Outcome: Progressing Towards Goal  Goal: *Monitoring blood glucose, interpreting and using results  Description  Identify recommended blood glucose targets  and personal targets. Outcome: Progressing Towards Goal  Goal: *Prevention, detection, treatment of acute complications  Description  List symptoms of hyper- and hypoglycemia; describe how to treat low blood sugar and actions for lowering  high blood glucose level. Outcome: Progressing Towards Goal     Problem: Falls - Risk of  Goal: *Absence of Falls  Description  Document Alyson Rhodes Fall Risk and appropriate interventions in the flowsheet. Outcome: Progressing Towards Goal  Note:   Fall Risk Interventions:  Mobility Interventions: Assess mobility with egress test, OT consult for ADLs, Patient to call before getting OOB, PT Consult for mobility concerns, PT Consult for assist device competence         Medication Interventions: Assess postural VS orthostatic hypotension, Patient to call before getting OOB, Teach patient to arise slowly                   Problem: Pressure Injury - Risk of  Goal: *Prevention of pressure injury  Description  Document Gui Scale and appropriate interventions in the flowsheet.   Outcome: Progressing Towards Goal  Note:   Pressure Injury Interventions:  Sensory Interventions: Assess changes in LOC    Moisture Interventions: Absorbent underpads    Activity Interventions: Assess need for specialty bed, Pressure redistribution bed/mattress(bed type)    Mobility Interventions: Assess need for specialty bed, HOB 30 degrees or less, Pressure redistribution bed/mattress (bed type), PT/OT evaluation    Nutrition Interventions: Document food/fluid/supplement intake

## 2019-09-11 NOTE — PROGRESS NOTES
Shift assessment done. Pt seen in bed, resting. Respirations even and unlabored. Denies needs and pain. Dressing noted on R foot c/d/i. Instructed to call for assistance when needed. Call light in reach. Will continue to monitor.

## 2019-09-12 LAB
ANION GAP SERPL CALC-SCNC: 6 MMOL/L (ref 7–16)
BUN SERPL-MCNC: 15 MG/DL (ref 6–23)
CALCIUM SERPL-MCNC: 8.7 MG/DL (ref 8.3–10.4)
CHLORIDE SERPL-SCNC: 103 MMOL/L (ref 98–107)
CO2 SERPL-SCNC: 27 MMOL/L (ref 21–32)
CREAT SERPL-MCNC: 1.01 MG/DL (ref 0.8–1.5)
ERYTHROCYTE [DISTWIDTH] IN BLOOD BY AUTOMATED COUNT: 12.9 % (ref 11.9–14.6)
GLUCOSE BLD STRIP.AUTO-MCNC: 116 MG/DL (ref 65–100)
GLUCOSE BLD STRIP.AUTO-MCNC: 68 MG/DL (ref 65–100)
GLUCOSE BLD STRIP.AUTO-MCNC: 78 MG/DL (ref 65–100)
GLUCOSE BLD STRIP.AUTO-MCNC: 95 MG/DL (ref 65–100)
GLUCOSE SERPL-MCNC: 111 MG/DL (ref 65–100)
HCT VFR BLD AUTO: 34.4 % (ref 41.1–50.3)
HGB BLD-MCNC: 11.2 G/DL (ref 13.6–17.2)
MCH RBC QN AUTO: 28.1 PG (ref 26.1–32.9)
MCHC RBC AUTO-ENTMCNC: 32.6 G/DL (ref 31.4–35)
MCV RBC AUTO: 86.4 FL (ref 79.6–97.8)
NRBC # BLD: 0 K/UL (ref 0–0.2)
PLATELET # BLD AUTO: 221 K/UL (ref 150–450)
PMV BLD AUTO: 10.8 FL (ref 9.4–12.3)
POTASSIUM SERPL-SCNC: 4.3 MMOL/L (ref 3.5–5.1)
RBC # BLD AUTO: 3.98 M/UL (ref 4.23–5.6)
SODIUM SERPL-SCNC: 136 MMOL/L (ref 136–145)
WBC # BLD AUTO: 7.2 K/UL (ref 4.3–11.1)

## 2019-09-12 PROCEDURE — 74011250637 HC RX REV CODE- 250/637: Performed by: SURGERY

## 2019-09-12 PROCEDURE — 65270000029 HC RM PRIVATE

## 2019-09-12 PROCEDURE — 74011250636 HC RX REV CODE- 250/636: Performed by: HOSPITALIST

## 2019-09-12 PROCEDURE — 80048 BASIC METABOLIC PNL TOTAL CA: CPT

## 2019-09-12 PROCEDURE — 74011000258 HC RX REV CODE- 258: Performed by: SURGERY

## 2019-09-12 PROCEDURE — 85027 COMPLETE CBC AUTOMATED: CPT

## 2019-09-12 PROCEDURE — 82962 GLUCOSE BLOOD TEST: CPT

## 2019-09-12 PROCEDURE — 99218 HC RM OBSERVATION: CPT

## 2019-09-12 PROCEDURE — 77030020263 HC SOL INJ SOD CL0.9% LFCR 1000ML

## 2019-09-12 PROCEDURE — 74011250636 HC RX REV CODE- 250/636: Performed by: SURGERY

## 2019-09-12 PROCEDURE — 36415 COLL VENOUS BLD VENIPUNCTURE: CPT

## 2019-09-12 RX ADMIN — CEFTRIAXONE 1 G: 1 INJECTION, POWDER, FOR SOLUTION INTRAMUSCULAR; INTRAVENOUS at 23:12

## 2019-09-12 RX ADMIN — HEPARIN SODIUM 5000 UNITS: 5000 INJECTION INTRAVENOUS; SUBCUTANEOUS at 05:22

## 2019-09-12 RX ADMIN — HEPARIN SODIUM 5000 UNITS: 5000 INJECTION INTRAVENOUS; SUBCUTANEOUS at 15:02

## 2019-09-12 RX ADMIN — METFORMIN HYDROCHLORIDE: 500 TABLET, FILM COATED ORAL at 08:54

## 2019-09-12 RX ADMIN — HEPARIN SODIUM 5000 UNITS: 5000 INJECTION INTRAVENOUS; SUBCUTANEOUS at 21:26

## 2019-09-12 RX ADMIN — ACETAMINOPHEN 650 MG: 325 TABLET, FILM COATED ORAL at 20:12

## 2019-09-12 RX ADMIN — SODIUM CHLORIDE 100 ML/HR: 900 INJECTION, SOLUTION INTRAVENOUS at 02:24

## 2019-09-12 NOTE — PROGRESS NOTES
Hospitalist Progress Note    Patient: Mirlande Babcock MRN: [id number]  SSN: [id number]    YOB: 1965  Age: 49 y.o. Sex: male      Admit Date: 9/9/2019    LOS: 0 days     Subjective:     49year old male with poorly controlled DM2 and a right foot diabetic wound readmitted due to continued infection despite being on IV Ceftriaxone at home for a few days prior to \"losing\" his PICC line. 9/12 - States his foot feels good s/p STSG. Denies CP/SOB. Denies F/C/N/V. Review of systems negative except stated above. Objective:     Visit Vitals  /81 (BP 1 Location: Right arm, BP Patient Position: At rest)   Pulse 60   Temp 97.9 °F (36.6 °C)   Resp 16   Ht 5' 9\" (1.753 m)   Wt 95.3 kg (210 lb)   SpO2 97%   BMI 31.01 kg/m²      Oxygen Therapy  O2 Sat (%): 97 % (09/12/19 0720)  O2 Device: Room air (09/11/19 1755)      Intake and Output:     Intake/Output Summary (Last 24 hours) at 9/12/2019 1017  Last data filed at 9/12/2019 0525  Gross per 24 hour   Intake 850 ml   Output 1310 ml   Net -460 ml         Physical Exam:   GENERAL: alert, cooperative, no distress, appears stated age  EYE: conjunctivae/corneas clear. PERRL. THROAT & NECK: normal and no erythema or exudates noted. LUNG: clear to auscultation bilaterally  HEART: regular rate and rhythm, S1S2, no murmur, no JVD  ABDOMEN: soft, non-tender, non-distended. Bowel sounds normal.   EXTREMITIES:  Right foot with extensive wound with serous drainage s/p STSG  SKIN: Right foot with extensive wound with serous drainage  NEUROLOGIC: A&Ox3. Cranial nerves 2-12 grossly intact.     Lab/Data Review:  Recent Results (from the past 24 hour(s))   GLUCOSE, POC    Collection Time: 09/11/19 11:44 AM   Result Value Ref Range    Glucose (POC) 116 (H) 65 - 100 mg/dL   GLUCOSE, POC    Collection Time: 09/11/19 10:05 PM   Result Value Ref Range    Glucose (POC) 148 (H) 65 - 100 mg/dL   PLEASE READ & DOCUMENT PPD TEST IN 48 HRS    Collection Time: 09/11/19 11:47 PM Result Value Ref Range    PPD Negative Negative    mm Induration 0 0 - 5 mm   GLUCOSE, POC    Collection Time: 09/12/19  7:21 AM   Result Value Ref Range    Glucose (POC) 116 (H) 65 - 100 mg/dL       Imaging:  Xr Foot Rt Min 3 V    Result Date: 9/9/2019  RIGHT FOOT SERIES HISTORY: Pain COMPARISON: August 23, 2019 FINDINGS: The fifth toe has been amputated. There is no displaced fracture, malalignment or osseous erosions. IMPRESSION: Fifth toe amputation. Xr Foot Rt Min 3 V    Result Date: 8/23/2019  History: Redness and swelling of the right foot. Pain EXAM: 3 views right foot FINDINGS: There is a question of a triangular shaped foreign body along the plantar surface of the right foot at the level of the third proximal phalanx. There is subcutaneous emphysema, raising possibility for superimposed infection. No fracture or dislocation. IMPRESSION: Findings suggestive of triangular radiopaque foreign body within the plantar soft tissues at the level of the third proximal phalanx. There is also subcutaneous emphysema seen extending from the first through fifth digits at the level of the proximal phalanges. Mri Foot Rt W Wo Cont    Result Date: 8/26/2019  HISTORY: Recent fifth toe amputation and foreign body removal. EXAM: MRI right foot with and without contrast TECHNIQUE: Multiplanar multisequence imaging is performed both before and after the uneventful administration of 20 mL Dotarem. COMPARISON: Plain films dated 8/23/2019 FINDINGS: The exam is compromised by patient motion. There is a large soft tissue defect seen along the plantar surface of the right foot. Findings compatible with the patient's given clinical history of recent surgery. The patient is status post interval removal of the fifth digit at the level of the MTP articulation. No evidence of osteomyelitis or abscess. There is edema in the plantar musculature of the right foot. IMPRESSION: 1.  Large soft tissue defect with postsurgical change as described. 2. No evidence of abscess or osteomyelitis. 3. Plantar muscular edema. Findings compatible with chronic denervation change. Duplex Low Ext Arteries With Karolina    Result Date: 8/26/2019  TITLE: Lower extremity arterial ultrasound examination. INDICATION: Uncontrolled diabetes. Diabetic foot infection. TECHNIQUE: Grayscale, color, and Doppler interrogation performed. The entire length of all of the arteries of the extremity were evaluated. COMPARISON: None. SEGMENTAL BP:  The right and left lower extremity segmental blood pressures are fairly symmetric and unremarkable. KAROLINA:  Right = 1.28            Left = 1.28 RIGHT LOWER EXTREMITY:  Peak systolic velocities-- within normal limits. Normal triphasic waveform throughout. LEFT LOWER EXTREMITY:  Peak systolic velocities-- within normal limits. Mildly elevated distal popliteal artery velocity. Normal triphasic waveforms throughout. ABDOMEN:  No evidence of aneurysm. IMPRESSION:  No significant arterial stenosis identified. No results found for this visit on 09/09/19. Cultures: All Micro Results     None          Assessment/Plan:     Principal Problem:    Diabetic ulcer of right foot associated with type 2 diabetes mellitus, with necrosis of muscle ([facility] [address] (8/24/2019)  - S/P STSG on 9/12/19  - Continue Ceftriaxone based on previous cultures  - Appreciate general surgery's input and assistance    Active Problems:    Diabetic foot infection ([address] (9/9/2019)  - Continue Ceftriaxone based on previous cultures  - Appreciate general surgery's input and assistance      Leukocytosis (9/9/2019)  - Resolved      Uncontrolled type II diabetes mellitus ([facility] [address] (8/23/2019)  - A1C 8.2  - Blood sugars mildly elevated  - Stopped Lantus 10U  - ContinueGlyburide/Metformin (on [facility] $4 list)  - Continue Humalog SSI  - Patient requesting PO meds because he does not want to take insulin      Medically noncompliant (9/9/2019)    Dispo - Continue Ceftriaxone. Would benefit from placement at discharge if possible.     DIET DIABETIC CONSISTENT CARB Regular    DVT Prophylaxis: Heparin      Signed By: Melissa Rashid DO     September 12, 2019

## 2019-09-12 NOTE — PROGRESS NOTES
Interdisciplinary team rounds were held 9/12/2019 with the following team members:Care Management, Nursing, Nutrition, Pharmacy, Physical Therapy and Physician. Plan of care discussed. See clinical pathway and/or care plan for interventions and desired outcomes.

## 2019-09-12 NOTE — PROGRESS NOTES
SW received a call today from pt's sister Catalino Calix ([phone]). Lengthy conversation for over an hour. Pt's entire history discussed. Pt was always in special ed classes but did graduate high school. Sister refers to him as slow. Pt has worked hourly paying jobs his whole life. Pt had health Insurance until he was 27 on his parent's policy. Pt has not had Insurance since. Pt has lived several different places, but Vinh Shaffer seems to get taken advantage of\". Pt's sister has been paying pt's rent for the house he is currently in. Sister and pt are not extremely close as she is 6 yrs older than he. She left home (went to college in [place]) and never returned. Pt has been managing his life but at times receives money from his mother (who is 80 and in a NH in [facility]). Pt has a drivers license and worked at [employer] most recently. Sister is not sure what she can do to help in this situation. She lives in [place], [place]. Since pt has no Ins, no doctor, no job and a filthy/hot home with no AC Sw and sister are just not sure what options he has. Sister entertained bringing pt to her home, but since he would be crossing state lines he would not be able to be seen by [facility] (no Md) and his Medicaid is for [place]. Sister going to [facility] to call Lázaro Whitfield to see what is being done with pt's home. She is going to come to [place] this weekend to see if the cats have been removed and to see if any of pt's belongings can be salvaged from the house. Sister likely planning to try to move him into a /safer apt. Sister knows pt needs care giving, meds, wound care, and total non weightbearing assistance at this time. She has 2 spare rooms since she has 2 daughters in college right now, but just doesn't feel pt would agree to living with her. She works full time as an AB therapist and would only be home in evenings and weekends. Slim listened and attempted to empathize with this horrible situation.  Sw unsure of any solutions at this time. Pt to receive IV antibiotics until 9/12. Will continue to follow and attempt to assist by problem solving for this very unfortunate pt. Will attempt to talk with NH liaisons in the community to see if an arrangement could be worked out to take pt with his Medicaid Disability still pending.  Jose G Villa

## 2019-09-12 NOTE — ANESTHESIA POSTPROCEDURE EVALUATION
Procedure(s):  SPLIT THICKNESS SKIN GRAFT FROM RIGHT THIGH TO RIGHT FOOT.     general    Anesthesia Post Evaluation      Multimodal analgesia: multimodal analgesia used between 6 hours prior to anesthesia start to PACU discharge  Patient location during evaluation: bedside  Patient participation: complete - patient participated  Level of consciousness: awake and responsive to light touch  Pain management: adequate  Airway patency: patent  Anesthetic complications: no  Cardiovascular status: acceptable, hemodynamically stable, blood pressure returned to baseline and stable  Respiratory status: acceptable, unassisted, spontaneous ventilation and nonlabored ventilation  Hydration status: acceptable  Post anesthesia nausea and vomiting:  controlled      Vitals Value Taken Time   /80 9/11/2019  6:31 PM   Temp 36.7 °C (98 °F) 9/11/2019  5:13 PM   Pulse 64 9/11/2019  6:31 PM   Resp 16 9/11/2019  6:20 PM   SpO2 96 % 9/11/2019  5:55 PM

## 2019-09-12 NOTE — PROGRESS NOTES
Shift assessment complete. Pt resting quietly in bed. No signs of acute distress. Resp even and unlabored. Pt with dressing to right thigh and right foot c/d/i. Call light within reach. Pt instructed to call for assistance.

## 2019-09-12 NOTE — PROGRESS NOTES
Problem: Diabetes Self-Management  Goal: *Disease process and treatment process  Description  Define diabetes and identify own type of diabetes; list 3 options for treating diabetes. Outcome: Progressing Towards Goal  Goal: *Incorporating nutritional management into lifestyle  Description  Describe effect of type, amount and timing of food on blood glucose; list 3 methods for planning meals. Outcome: Progressing Towards Goal  Goal: *Monitoring blood glucose, interpreting and using results  Description  Identify recommended blood glucose targets  and personal targets. Outcome: Progressing Towards Goal  Goal: *Prevention, detection, treatment of acute complications  Description  List symptoms of hyper- and hypoglycemia; describe how to treat low blood sugar and actions for lowering  high blood glucose level. Outcome: Progressing Towards Goal     Problem: Falls - Risk of  Goal: *Absence of Falls  Description  Document Travis Boeck Fall Risk and appropriate interventions in the flowsheet.   Outcome: Progressing Towards Goal  Note:   Fall Risk Interventions:  Mobility Interventions: Patient to call before getting OOB, PT Consult for mobility concerns, PT Consult for assist device competence         Medication Interventions: Patient to call before getting OOB, Teach patient to arise slowly

## 2019-09-12 NOTE — PROGRESS NOTES
H&P/Consult Note/Progress Note/Office Note:   Austen Govea  MRN: [id number]  :1965  Age:54 y.o.    HPI: Austen Govea is a 49 y.o. male who was admitted on 19 from the ER by the Hospitalists with a diabetic right foot infection. He is s/p multiple right foot abscess drainages, removal of glass foreign body and right 5th toe amputation on 19. He was re-admitted for self-neglect and failure of outpt management. ER recently noted much dirt, hair and cigarette butt in his wound. He is a diabetic with no meds, transportation, or resources to gets meds. His home is scheduled to be condemned in approx 21 days  [address] refuses to go to his home and they say it is unsafe environment    He is s/p STSG and dermal autografts from right thigh to right foot on 19. He is non-weight bearing on right foot. Prior to his initial admission he reported progressive, constant, severe right foot pain for at least 2 days. He was walking barefoot. He had associated low grade fever. He was placed on IV Abx by Hospitalists  He has h/o DM and non-compliance (No primary care doctor, not on DM meds)  He was not taking his insulin because \"no one showed me how\"  Bld Cxs from admission grew gm + cocci; W[place] cultures from [place] grew pan-sensitive Strep and Staph Aureus   Xray as below    Surgery recommended inpt care or SNF but he had no insurance   helped him apply for [facility]      19 Xray right foot, 3 views  Triangular radiopaque foreign body within the plantar soft tissues at the level of the third proximal phalanx. Subq emphysema seen extending from the first through fifth digits at the level of the proximal phalanges        19 Arterial duplex  The right and left lower extremity segmental blood pressures are fairly symmetric and unremarkable. KAROLINA:  Right = 1.28              Left = 1.28     RIGHT LOWER EXTREMITY:  Peak systolic velocities-- within normal limits.    Normal triphasic waveform throughout.     LEFT LOWER EXTREMITY:  Peak systolic velocities-- within normal limits. Mildly  elevated distal popliteal artery velocity.  Normal triphasic waveforms throughout.     ABDOMEN:  No evidence of aneurysm.     IMPRESSION:  No significant arterial stenosis identified.             8/26/19 POD1 right foot debridement, abscess drainage, 5th toe amputation, and FB removal  8/27/19 POD2 No sign arterial stenosis on non-invasive vasc study   8/28/19 POD3 cultures from OR with alpha Strep  8/29/19 POD4 On IV Vanc; daily dressing changes; off-loading; case management working on medicaid and SNF placement  8/30/19 POD5; ON IV ceftriaxone per ID until 9/8/19 8/31/19 POD6; IV ceftriaxone planned until 9/8/19; no weight bearing right foot    9/9/19 Arrived in ER for re-admission  9/11/19 OR today for STSG  9/12/19 POD1 STSG and dermal autografts right thigh to right foot wound            Past Medical History:   Diagnosis Date    Diabetes [facility])      Past Surgical History:   Procedure Laterality Date    HX CHOLECYSTECTOMY       Current Facility-Administered Medications   Medication Dose Route Frequency    glyBURIDE/metFORMIN (GLUCOVANCE) 2.5/500 mg   Oral DAILY WITH BREAKFAST    hydrALAZINE (APRESOLINE) 20 mg/mL injection 10 mg  10 mg IntraVENous Q20MIN PRN    dextrose 40% (GLUTOSE) oral gel 1 Tube  15 g Oral PRN    glucagon (GLUCAGEN) injection 1 mg  1 mg IntraMUSCular PRN    dextrose (D50W) injection syrg 12.5-25 g  25-50 mL IntraVENous PRN    insulin lispro (HUMALOG) injection   SubCUTAneous AC&HS    acetaminophen (TYLENOL) tablet 650 mg  650 mg Oral Q4H PRN    0.9% sodium chloride infusion  100 mL/hr IntraVENous CONTINUOUS    ondansetron (ZOFRAN) injection 4 mg  4 mg IntraVENous Q4H PRN    heparin (porcine) injection 5,000 Units  5,000 Units SubCUTAneous Q8H    bisacodyl (DULCOLAX) tablet 5 mg  5 mg Oral DAILY PRN    diphenhydrAMINE (BENADRYL) capsule 25 mg  25 mg Oral Q6H PRN    cefTRIAXone (ROCEPHIN) 1 g in 0.9% sodium chloride (MBP/ADV) 50 mL  1 g IntraVENous Q24H     Patient has no known allergies. Social History     Socioeconomic History    Marital status: SINGLE     Spouse name: Not on file    Number of children: Not on file    Years of education: Not on file    Highest education level: Not on file   Tobacco Use    Smoking status: Current Every Day Smoker    Smokeless tobacco: Never Used   Substance and Sexual Activity    Alcohol use: No    Sexual activity: Never     Social History     Tobacco Use   Smoking Status Current Every Day Smoker   Smokeless Tobacco Never Used     History reviewed. No pertinent family history. ROS: The patient has no difficulty with chest pain or shortness of breath. No fever or chills. Comprehensive review of systems was otherwise unremarkable except as noted above. Physical Exam:   Visit Vitals  BP (!) 147/95 (BP 1 Location: Right arm, BP Patient Position: At rest;Supine) Comment: Rosalva Helms RN notified   Pulse 65   Temp 98.6 °F (37 °C)   Resp 16   Ht 5' 9\" (1.753 m)   Wt 210 lb (95.3 kg)   SpO2 95%   BMI 31.01 kg/m²     Vitals:    09/11/19 1845 09/11/19 1943 09/11/19 2350 09/12/19 0442   BP: 174/70 138/74 111/53 (!) 147/95   Pulse: 69 77 67 65   Resp: 18 18 16 16   Temp: 97.7 °F (36.5 °C) 97.4 °F (36.3 °C) 98.9 °F (37.2 °C) 98.6 °F (37 °C)   SpO2: 95% 95% 97% 95%   Weight:       Height:         No intake/output data recorded. 09/10 1901 - 09/12 0700  In: 850 [I.V.:850]  Out: 1860 [Urine:1850]    Constitutional: Alert, oriented, cooperative patient in no acute distress; appears stated age    Eyes:Sclera are clear. ENMT: no external lesions gross hearing normal; no obvious neck masses, no ear or lip lesions, nares normal  CV: RRR. Normal perfusion  Resp: Breathing is  non-labored; no audible wheezing. GI: soft and non-distended     Musculoskeletal: unremarkable with normal function. No embolic signs or cyanosis. Right 5th toe amputation.    Right foot wrapped following grafting on 9/11/19  Right thigh harvest site flap closure dressing dry and intact  No fluctuance;  No crepitus, +edema      Neuro:  Oriented; moves all 4; no focal deficits  Psychiatric: normal affect and mood, no memory impairment                                Recent vitals (if inpt):  Patient Vitals for the past 24 hrs:   BP Temp Pulse Resp SpO2   09/12/19 0442 (!) 147/95 98.6 °F (37 °C) 65 16 95 %   09/11/19 2350 111/53 98.9 °F (37.2 °C) 67 16 97 %   09/11/19 1943 138/74 97.4 °F (36.3 °C) 77 18 95 %   09/11/19 1845 174/70 97.7 °F (36.5 °C) 69 18 95 %   09/11/19 1831 174/80 -- 64 -- --   09/11/19 1820 195/89 -- 62 16 --   09/11/19 1819 (!) 211/98 -- 62 -- --   09/11/19 1808 (!) 202/83 -- -- -- --   09/11/19 1806 (!) 218/92 -- -- -- --   09/11/19 1805 (!) 211/91 -- -- -- --   09/11/19 1757 185/84 -- -- -- --   09/11/19 1755 (!) 208/89 -- 64 14 96 %   09/11/19 1740 175/90 -- 64 14 95 %   09/11/19 1730 169/79 -- 64 14 94 %   09/11/19 1725 176/77 -- 64 14 94 %   09/11/19 1720 150/70 -- 64 14 94 %   09/11/19 1715 162/42 -- 68 14 96 %   09/11/19 1713 163/74 98 °F (36.7 °C) 68 14 96 %   09/11/19 1208 151/75 96.9 °F (36.1 °C) (!) 56 18 95 %   09/11/19 0746 154/89 97.4 °F (36.3 °C) (!) 58 18 96 %       Labs:  Recent Labs     09/10/19  0456 09/09/19 2024   WBC 8.0  --    HGB 11.4*  --      --     134*   K 4.4 4.9    99   CO2 29 27   BUN 23 26*   CREA 1.26 1.52*   * 187*   TBILI  --  0.7   SGOT  --  25   ALT  --  31   AP  --  112       Lab Results   Component Value Date/Time    WBC 8.0 09/10/2019 04:56 AM    HGB 11.4 (L) 09/10/2019 04:56 AM    PLATELET 444 00/28/7410 04:56 AM    Sodium 136 09/10/2019 04:56 AM    Potassium 4.4 09/10/2019 04:56 AM    Chloride 102 09/10/2019 04:56 AM    CO2 29 09/10/2019 04:56 AM    BUN 23 09/10/2019 04:56 AM    Creatinine 1.26 09/10/2019 04:56 AM    Glucose 145 (H) 09/10/2019 04:56 AM    Bilirubin, total 0.7 09/09/2019 08:24 PM    AST (SGOT) 25 09/09/2019 08:24 PM    ALT (SGPT) 31 09/09/2019 08:24 PM    Alk. phosphatase 112 09/09/2019 08:24 PM       CT Results  (Last 48 hours)    None        chest X-ray      I reviewed recent labs, recent radiologic studies, and pertinent records including other doctor notes if needed. I independently reviewed radiology images for studies I described above or studies I have ordered. Admission date (for inpatients): 9/9/2019   * No surgery found *  Procedure(s):  SPLIT THICKNESS SKIN GRAFT FROM RIGHT THIGH TO RIGHT FOOT    ASSESSMENT/PLAN:  Problem List  Date Reviewed: 9/9/2019          Codes Class Noted    Self neglect ICD-10-CM: R46.89  ICD-9-CM: V40.39  9/11/2019        Medically noncompliant ICD-10-CM: Z91.19  ICD-9-CM: V15.81  9/9/2019        Uncontrolled type II diabetes mellitus ([facility] ICD-10-CM: E11.65  ICD-9-CM: 250.02  8/23/2019        Leukocytosis ICD-10-CM: O96.873  ICD-9-CM: 288.60  9/9/2019        Diabetic foot infection ([facility] ICD-10-CM: E11.628, L08.9  ICD-9-CM: 250.80, 686.9  9/9/2019        * (Principal) Diabetic ulcer of right foot associated with type 2 diabetes mellitus, with necrosis of muscle ([facility] ICD-10-CM: E11.621, L97.513  ICD-9-CM: 250.80, 707.15  8/24/2019    Overview Signed 9/11/2019  3:41 PM by Jorge Limon MD     9/11/19 s/p Autologous STSG from right thigh to right foot; Dr Woody Devries             Open wound of right foot ICD-10-CM: S91.301A  ICD-9-CM: 892.0  9/9/2019        Sepsis ([facility] ICD-10-CM: A41.9  ICD-9-CM: 038.9, 995.91  8/24/2019        Cellulitis of right leg ICD-10-CM: L03.115  ICD-9-CM: 682.6  8/23/2019        Foreign body (FB) in soft tissue ICD-10-CM: M79.5  ICD-9-CM: 729.6  8/23/2019        Subcutaneous emphysema ([address] ICD-10-CM: T79. 7XXA  ICD-9-CM: 958.7  8/23/2019        Personal history of noncompliance with medical treatment ICD-10-CM: Z91.19  ICD-9-CM: V15.81  8/23/2019        Hyponatremia ICD-10-CM: E87.1  ICD-9-CM: 276.1  8/23/2019            Principal Problem: Diabetic ulcer of right foot associated with type 2 diabetes mellitus, with necrosis of muscle ([facility] (8/24/2019)      Overview: 9/11/19 s/p Autologous STSG from right thigh to right foot; Dr Hank Gold    Active Problems:    Medically noncompliant (9/9/2019)      Self neglect (9/11/2019)      Uncontrolled type II diabetes mellitus ([address] (8/23/2019)      Leukocytosis (9/9/2019)      Diabetic foot infection ([address] (9/9/2019)      Open wound of right foot (9/9/2019)         Diabetic right foot infection - limb threatening  He is s/p STSG and dermal autograft from right thigh to right foot on 9/11/19  - POD1  He is also s/p right 5th toe amputation, abscess drainage, distal plantar forefoot debridement with extraction of glass (FB) on 8/25/19      No weight bearing right foot following grafting  Leave the thigh and foot dressing   I will change them on Monday (POD 5) and see him again at that time  OR cultures --->gm + cocci with alpha Strep    Recommend IV Abx for 7 more days until at least Thursday 9/19/19    Recommend SNF placement for 1-2 months    He failed outpt management and has demonstrated he won't be compliant with outpt Rx at home (diabetic, dressings, off-loading, etc...)  He lives alone without support in a home that the health department is threatening to condmenn in approx 25 days      PVD  No significant arterial stenosis noted on non-invasive arterial duplex on 8/26/19    Uncontrolled DM  Inpt diabetic control  Sliding scale and semiquants    Non-Compliance with outpt DM regimen  Encourage compliance  Risks of non-compliance discussed including amputation and early death  He needs SNF            I have personally performed a face-to-face diagnostic evaluation and management  service on this patient. I have independently seen the patient. I have independently obtained the above history from the patient/family. I have independently examined the patient with above findings.   I have independently reviewed data/labs for this patient and developed the above plan of care (MDM). Signed: Orlando Su.  Ricardo Tilley MD, FACS

## 2019-09-12 NOTE — PROGRESS NOTES
09/11/19 1900   Dual Skin Pressure Injury Assessment   Dual Skin Pressure Injury Assessment WDL   Second Care Provider (Based on [address] [place] [address]) Marcell Devlin RN   Skin Integumentary   Skin Integumentary (WDL) X   Skin Color Appropriate for ethnicity   Skin Condition/Temp Warm   Skin Integrity Incision (comment)  (R thigh and R foot)

## 2019-09-12 NOTE — PROGRESS NOTES
Admission assessment done. Received from PACU, alert and oriented. Respirations even and unlabored. Abdomen soft with active bowel sounds. Denies needs and pain this time. RLE with dressing, wrapped with coban c/d/i. Call light in reach. Will continue to monitor.

## 2019-09-13 LAB
ANION GAP SERPL CALC-SCNC: 5 MMOL/L (ref 7–16)
BUN SERPL-MCNC: 13 MG/DL (ref 6–23)
CALCIUM SERPL-MCNC: 8.6 MG/DL (ref 8.3–10.4)
CHLORIDE SERPL-SCNC: 104 MMOL/L (ref 98–107)
CO2 SERPL-SCNC: 28 MMOL/L (ref 21–32)
CREAT SERPL-MCNC: 0.81 MG/DL (ref 0.8–1.5)
ERYTHROCYTE [DISTWIDTH] IN BLOOD BY AUTOMATED COUNT: 12.9 % (ref 11.9–14.6)
GLUCOSE BLD STRIP.AUTO-MCNC: 102 MG/DL (ref 65–100)
GLUCOSE BLD STRIP.AUTO-MCNC: 102 MG/DL (ref 65–100)
GLUCOSE BLD STRIP.AUTO-MCNC: 116 MG/DL (ref 65–100)
GLUCOSE BLD STRIP.AUTO-MCNC: 93 MG/DL (ref 65–100)
GLUCOSE SERPL-MCNC: 87 MG/DL (ref 65–100)
HCT VFR BLD AUTO: 32.7 % (ref 41.1–50.3)
HGB BLD-MCNC: 10.7 G/DL (ref 13.6–17.2)
MCH RBC QN AUTO: 27.9 PG (ref 26.1–32.9)
MCHC RBC AUTO-ENTMCNC: 32.7 G/DL (ref 31.4–35)
MCV RBC AUTO: 85.2 FL (ref 79.6–97.8)
NRBC # BLD: 0 K/UL (ref 0–0.2)
PLATELET # BLD AUTO: 192 K/UL (ref 150–450)
PMV BLD AUTO: 10.2 FL (ref 9.4–12.3)
POTASSIUM SERPL-SCNC: 4 MMOL/L (ref 3.5–5.1)
RBC # BLD AUTO: 3.84 M/UL (ref 4.23–5.6)
SODIUM SERPL-SCNC: 137 MMOL/L (ref 136–145)
WBC # BLD AUTO: 6.8 K/UL (ref 4.3–11.1)

## 2019-09-13 PROCEDURE — 74011250637 HC RX REV CODE- 250/637: Performed by: SURGERY

## 2019-09-13 PROCEDURE — 65270000029 HC RM PRIVATE

## 2019-09-13 PROCEDURE — 85027 COMPLETE CBC AUTOMATED: CPT

## 2019-09-13 PROCEDURE — 77030020263 HC SOL INJ SOD CL0.9% LFCR 1000ML

## 2019-09-13 PROCEDURE — 74011000258 HC RX REV CODE- 258: Performed by: SURGERY

## 2019-09-13 PROCEDURE — 82962 GLUCOSE BLOOD TEST: CPT

## 2019-09-13 PROCEDURE — 74011250636 HC RX REV CODE- 250/636: Performed by: ANESTHESIOLOGY

## 2019-09-13 PROCEDURE — 80048 BASIC METABOLIC PNL TOTAL CA: CPT

## 2019-09-13 PROCEDURE — 36415 COLL VENOUS BLD VENIPUNCTURE: CPT

## 2019-09-13 PROCEDURE — 74011250636 HC RX REV CODE- 250/636: Performed by: SURGERY

## 2019-09-13 RX ORDER — HYDRALAZINE HYDROCHLORIDE 20 MG/ML
10 INJECTION INTRAMUSCULAR; INTRAVENOUS
Status: DISCONTINUED | OUTPATIENT
Start: 2019-09-13 | End: 2019-09-20 | Stop reason: HOSPADM

## 2019-09-13 RX ADMIN — CEFTRIAXONE 1 G: 1 INJECTION, POWDER, FOR SOLUTION INTRAMUSCULAR; INTRAVENOUS at 23:33

## 2019-09-13 RX ADMIN — METFORMIN HYDROCHLORIDE: 500 TABLET, FILM COATED ORAL at 08:02

## 2019-09-13 RX ADMIN — HEPARIN SODIUM 5000 UNITS: 5000 INJECTION INTRAVENOUS; SUBCUTANEOUS at 21:51

## 2019-09-13 RX ADMIN — HYDRALAZINE HYDROCHLORIDE 10 MG: 20 INJECTION INTRAMUSCULAR; INTRAVENOUS at 20:34

## 2019-09-13 RX ADMIN — ACETAMINOPHEN 650 MG: 325 TABLET, FILM COATED ORAL at 23:39

## 2019-09-13 RX ADMIN — SODIUM CHLORIDE 100 ML/HR: 900 INJECTION, SOLUTION INTRAVENOUS at 05:38

## 2019-09-13 RX ADMIN — HEPARIN SODIUM 5000 UNITS: 5000 INJECTION INTRAVENOUS; SUBCUTANEOUS at 14:04

## 2019-09-13 RX ADMIN — HEPARIN SODIUM 5000 UNITS: 5000 INJECTION INTRAVENOUS; SUBCUTANEOUS at 05:46

## 2019-09-13 NOTE — PROGRESS NOTES
Slim cont to work on pt's dispo. Slim emailed the Interim Director of  this pm requesting she check into the possibility of the hospital working with one of our [address] [facility] and doing a one time contract as has been done in the past. This would be temporary until pt's Medicaid was approved. Director looking into this, but believes this is no longer an option at this time. Slim cont to network with community agencies and collaborate with family in an effort to come up with a suggestion for pt's discharge. None found at this time.  Mary Perkins

## 2019-09-13 NOTE — PROGRESS NOTES
Shift assessment completed via doc flow sheet. A/O x 4. No acute distress noted. Rt foot dressing c/d/i. Pt denies c/o pain at this time. Bed in low and locked position. Bed alarm on. Pt is instructed to call for assistance. Call light is within reach.

## 2019-09-13 NOTE — PROGRESS NOTES
Nutrition  Reason for assessment: Length of stay    Assessment:   49year old male with poorly controlled DM2 and a right foot diabetic wound readmitted due to continued infection despite being on IV Ceftriaxone at home for a few days prior to \"losing\" his PICC line. He went for a STSG (skin graft) on 9/11/19. He will need Ceftriaxone through 9/19/19. Food/Nutrition Patient History:  Pt known to RD from prior admission. Throughout visit he remains on his mobile device but answers RD questions appropriately. He recalls prior discussions about consistent cho intake but does not tell me what he was eating at home. He indicates he is eating better during this admission and recalls consuming 2/3 to all of his foods. DIET DIABETIC CONSISTENT CARB Regular    POC glucose (9/12) ; (9/13) 102-116 A1C 8.2 down from 9.7. Anthropometrics:Height: 5' 9\" (175.3 cm),  Weight: 95.3 kg (210 lb), unspecified , Body mass index is 31.01 kg/m². BMI class of obesity. Weight remains as the unspecified weight also recorded on last admission. Macronutrient needs: 95kg listed weight   EER:  3525-4274 kcal /day (20-25 kcal/kg)  EPR:   grams protein/day (1-1.25grams/kg)    Intake/Comparative Standards: Recorded meal(s): none. Pt recalls 2/3 to all of meals consumed. This potentially meets ~75-85% of kcal and ~75% of protein needs    Nutrition Diagnosis: Inadequate oral intake related to wound healing needs as evidenced by s/p STSG and dermal autograph from right thigh to right foot on 9/11 in setting of s/p R fifth toes amputation, abscess drainage meeting ~75% estimated needs. Intervention:  Meals and snacks: Continue current diet. Nutrition Supplement Therapy: Ensure High Protein once daily. Coordination of Nutrition Care: IDT rounds. Discharge Nutrition Plan: Too soon to determine.      [place], [address], [address]

## 2019-09-13 NOTE — PROGRESS NOTES
Problem: Diabetes Self-Management  Goal: *Disease process and treatment process  Description  Define diabetes and identify own type of diabetes; list 3 options for treating diabetes. Outcome: Progressing Towards Goal  Goal: *Monitoring blood glucose, interpreting and using results  Description  Identify recommended blood glucose targets  and personal targets. Outcome: Progressing Towards Goal  Goal: *Prevention, detection, treatment of acute complications  Description  List symptoms of hyper- and hypoglycemia; describe how to treat low blood sugar and actions for lowering  high blood glucose level. Outcome: Progressing Towards Goal  Goal: *Prevention, detection and treatment of chronic complications  Description  Define the natural course of diabetes and describe the relationship of blood glucose levels to long term complications of diabetes. Outcome: Progressing Towards Goal     Problem: Falls - Risk of  Goal: *Absence of Falls  Description  Document Naty Guillaume Fall Risk and appropriate interventions in the flowsheet. Outcome: Progressing Towards Goal  Note:   Fall Risk Interventions:  Mobility Interventions: Patient to call before getting OOB         Medication Interventions: Patient to call before getting OOB, Teach patient to arise slowly                   Problem: Patient Education: Go to Patient Education Activity  Goal: Patient/Family Education  Outcome: Progressing Towards Goal     Problem: Pressure Injury - Risk of  Goal: *Prevention of pressure injury  Description  Document Gui Scale and appropriate interventions in the flowsheet.   Outcome: Progressing Towards Goal  Note:   Pressure Injury Interventions:  Sensory Interventions: Assess changes in LOC    Moisture Interventions: Absorbent underpads    Activity Interventions: PT/OT evaluation    Mobility Interventions: PT/OT evaluation    Nutrition Interventions: Offer support with meals,snacks and hydration

## 2019-09-13 NOTE — PROGRESS NOTES
Problem: Diabetes Self-Management  Goal: *Disease process and treatment process  Description  Define diabetes and identify own type of diabetes; list 3 options for treating diabetes. Outcome: Progressing Towards Goal  Goal: *Incorporating nutritional management into lifestyle  Description  Describe effect of type, amount and timing of food on blood glucose; list 3 methods for planning meals. Outcome: Progressing Towards Goal  Goal: *Incorporating physical activity into lifestyle  Description  State effect of exercise on blood glucose levels. Outcome: Progressing Towards Goal  Goal: *Developing strategies to promote health/change behavior  Description  Define the ABC's of diabetes; identify appropriate screenings, schedule and personal plan for screenings. Outcome: Progressing Towards Goal  Goal: *Using medications safely  Description  State effect of diabetes medications on diabetes; name diabetes medication taking, action and side effects. Outcome: Progressing Towards Goal  Goal: *Monitoring blood glucose, interpreting and using results  Description  Identify recommended blood glucose targets  and personal targets. Outcome: Progressing Towards Goal  Goal: *Prevention, detection, treatment of acute complications  Description  List symptoms of hyper- and hypoglycemia; describe how to treat low blood sugar and actions for lowering  high blood glucose level. Outcome: Progressing Towards Goal  Goal: *Prevention, detection and treatment of chronic complications  Description  Define the natural course of diabetes and describe the relationship of blood glucose levels to long term complications of diabetes.   Outcome: Progressing Towards Goal  Goal: *Developing strategies to address psychosocial issues  Description  Describe feelings about living with diabetes; identify support needed and support network  Outcome: Progressing Towards Goal  Goal: *Insulin pump training  Outcome: Progressing Towards Goal  Goal: *Sick day guidelines  Outcome: Progressing Towards Goal  Goal: *Patient Specific Goal (EDIT GOAL, INSERT TEXT)  Outcome: Progressing Towards Goal     Problem: Patient Education: Go to Patient Education Activity  Goal: Patient/Family Education  Outcome: Progressing Towards Goal     Problem: Falls - Risk of  Goal: *Absence of Falls  Description  Document Janett Hassan Fall Risk and appropriate interventions in the flowsheet.   Outcome: Progressing Towards Goal  Note:   Fall Risk Interventions:  Mobility Interventions: Patient to call before getting OOB         Medication Interventions: Patient to call before getting OOB, Teach patient to arise slowly

## 2019-09-13 NOTE — PROGRESS NOTES
Hospitalist Progress Note    Patient: Edil Barron MRN: [id number]  SSN: [id number]    YOB: 1965  Age: 49 y.o. Sex: male      Admit Date: 9/9/2019    LOS: 1 day     Subjective:     49year old male with poorly controlled DM2 and a right foot diabetic wound readmitted due to continued infection despite being on IV Ceftriaxone at home for a few days prior to \"losing\" his PICC line. He went for a STSG (skin graft) on 9/11/19. He will need Ceftriaxone through 9/19/19.    9/13 - States his foot feels good s/p STSG. Denies CP/SOB. Denies F/C/N/V. Review of systems negative except stated above. Objective:     Visit Vitals  /80 (BP 1 Location: Right arm, BP Patient Position: At rest)   Pulse 66   Temp 98 °F (36.7 °C)   Resp 18   Ht 5' 9\" (1.753 m)   Wt 95.3 kg (210 lb)   SpO2 97%   BMI 31.01 kg/m²      Oxygen Therapy  O2 Sat (%): 97 % (09/13/19 0541)  O2 Device: Room air (09/11/19 1755)      Intake and Output:     Intake/Output Summary (Last 24 hours) at 9/13/2019 0852  Last data filed at 9/13/2019 0546  Gross per 24 hour   Intake 1876 ml   Output 2500 ml   Net -624 ml         Physical Exam:   GENERAL: alert, cooperative, no distress, appears stated age  EYE: conjunctivae/corneas clear. PERRL. THROAT & NECK: normal and no erythema or exudates noted. LUNG: clear to auscultation bilaterally  HEART: regular rate and rhythm, S1S2, no murmur, no JVD  ABDOMEN: soft, non-tender, non-distended. Bowel sounds normal.   EXTREMITIES:  Right foot with extensive wound with serous drainage s/p STSG  SKIN: Right foot with extensive wound with serous drainage  NEUROLOGIC: A&Ox3. Cranial nerves 2-12 grossly intact.     Lab/Data Review:  Recent Results (from the past 24 hour(s))   CBC W/O DIFF    Collection Time: 09/12/19 11:22 AM   Result Value Ref Range    WBC 7.2 4.3 - 11.1 K/uL    RBC 3.98 (L) 4.23 - 5.6 M/uL    HGB 11.2 (L) 13.6 - 17.2 g/dL    HCT 34.4 (L) 41.1 - 50.3 %    MCV 86.4 79.6 - 97.8 FL    MCH 28.1 26.1 - 32.9 PG    MCHC 32.6 31.4 - 35.0 g/dL    RDW 12.9 11.9 - 14.6 %    PLATELET 154 741 - 852 K/uL    MPV 10.8 9.4 - 12.3 FL    ABSOLUTE NRBC 0.00 0.0 - 0.2 K/uL   METABOLIC PANEL, BASIC    Collection Time: 09/12/19 11:22 AM   Result Value Ref Range    Sodium 136 136 - 145 mmol/L    Potassium 4.3 3.5 - 5.1 mmol/L    Chloride 103 98 - 107 mmol/L    CO2 27 21 - 32 mmol/L    Anion gap 6 (L) 7 - 16 mmol/L    Glucose 111 (H) 65 - 100 mg/dL    BUN 15 6 - 23 MG/DL    Creatinine 1.01 0.8 - 1.5 MG/DL    GFR est AA >60 >60 ml/min/1.73m2    GFR est non-AA >60 >60 ml/min/1.73m2    Calcium 8.7 8.3 - 10.4 MG/DL   GLUCOSE, POC    Collection Time: 09/12/19 11:48 AM   Result Value Ref Range    Glucose (POC) 95 65 - 100 mg/dL   GLUCOSE, POC    Collection Time: 09/12/19  4:41 PM   Result Value Ref Range    Glucose (POC) 78 65 - 100 mg/dL   GLUCOSE, POC    Collection Time: 09/12/19  9:32 PM   Result Value Ref Range    Glucose (POC) 68 65 - 100 mg/dL   CBC W/O DIFF    Collection Time: 09/13/19  5:23 AM   Result Value Ref Range    WBC 6.8 4.3 - 11.1 K/uL    RBC 3.84 (L) 4.23 - 5.6 M/uL    HGB 10.7 (L) 13.6 - 17.2 g/dL    HCT 32.7 (L) 41.1 - 50.3 %    MCV 85.2 79.6 - 97.8 FL    MCH 27.9 26.1 - 32.9 PG    MCHC 32.7 31.4 - 35.0 g/dL    RDW 12.9 11.9 - 14.6 %    PLATELET 456 514 - 859 K/uL    MPV 10.2 9.4 - 12.3 FL    ABSOLUTE NRBC 0.00 0.0 - 0.2 K/uL   METABOLIC PANEL, BASIC    Collection Time: 09/13/19  5:23 AM   Result Value Ref Range    Sodium 137 136 - 145 mmol/L    Potassium 4.0 3.5 - 5.1 mmol/L    Chloride 104 98 - 107 mmol/L    CO2 28 21 - 32 mmol/L    Anion gap 5 (L) 7 - 16 mmol/L    Glucose 87 65 - 100 mg/dL    BUN 13 6 - 23 MG/DL    Creatinine 0.81 0.8 - 1.5 MG/DL    GFR est AA >60 >60 ml/min/1.73m2    GFR est non-AA >60 >60 ml/min/1.73m2    Calcium 8.6 8.3 - 10.4 MG/DL   GLUCOSE, POC    Collection Time: 09/13/19  7:29 AM   Result Value Ref Range    Glucose (POC) 102 (H) 65 - 100 mg/dL       Imaging:  Xr Foot Rt Min 3 V    Result Date: 9/9/2019  RIGHT FOOT SERIES HISTORY: Pain COMPARISON: August 23, 2019 FINDINGS: The fifth toe has been amputated. There is no displaced fracture, malalignment or osseous erosions. IMPRESSION: Fifth toe amputation. Xr Foot Rt Min 3 V    Result Date: 8/23/2019  History: Redness and swelling of the right foot. Pain EXAM: 3 views right foot FINDINGS: There is a question of a triangular shaped foreign body along the plantar surface of the right foot at the level of the third proximal phalanx. There is subcutaneous emphysema, raising possibility for superimposed infection. No fracture or dislocation. IMPRESSION: Findings suggestive of triangular radiopaque foreign body within the plantar soft tissues at the level of the third proximal phalanx. There is also subcutaneous emphysema seen extending from the first through fifth digits at the level of the proximal phalanges. Mri Foot Rt W Wo Cont    Result Date: 8/26/2019  HISTORY: Recent fifth toe amputation and foreign body removal. EXAM: MRI right foot with and without contrast TECHNIQUE: Multiplanar multisequence imaging is performed both before and after the uneventful administration of 20 mL Dotarem. COMPARISON: Plain films dated 8/23/2019 FINDINGS: The exam is compromised by patient motion. There is a large soft tissue defect seen along the plantar surface of the right foot. Findings compatible with the patient's given clinical history of recent surgery. The patient is status post interval removal of the fifth digit at the level of the MTP articulation. No evidence of osteomyelitis or abscess. There is edema in the plantar musculature of the right foot. IMPRESSION: 1. Large soft tissue defect with postsurgical change as described. 2. No evidence of abscess or osteomyelitis. 3. Plantar muscular edema. Findings compatible with chronic denervation change.     Duplex Low Ext Arteries With Vicki    Result Date: 8/26/2019  TITLE: Lower extremity arterial ultrasound examination. INDICATION: Uncontrolled diabetes. Diabetic foot infection. TECHNIQUE: Grayscale, color, and Doppler interrogation performed. The entire length of all of the arteries of the extremity were evaluated. COMPARISON: None. SEGMENTAL BP:  The right and left lower extremity segmental blood pressures are fairly symmetric and unremarkable. KAROLINA:  Right = 1.28            Left = 1.28 RIGHT LOWER EXTREMITY:  Peak systolic velocities-- within normal limits. Normal triphasic waveform throughout. LEFT LOWER EXTREMITY:  Peak systolic velocities-- within normal limits. Mildly elevated distal popliteal artery velocity. Normal triphasic waveforms throughout. ABDOMEN:  No evidence of aneurysm. IMPRESSION:  No significant arterial stenosis identified. No results found for this visit on 09/09/19. Cultures: All Micro Results     None          Assessment/Plan:     Principal Problem:    Diabetic ulcer of right foot associated with type 2 diabetes mellitus, with necrosis of muscle ([facility] [address] (8/24/2019)  - S/P STSG on 9/12/19  - Continue Ceftriaxone based on previous cultures  - Appreciate general surgery's input and assistance    Active Problems:    Diabetic foot infection ([address] (9/9/2019)  - Continue Ceftriaxone based on previous cultures  - Appreciate general surgery's input and assistance      Leukocytosis (9/9/2019)  - Resolved      Uncontrolled type II diabetes mellitus ([address] (8/23/2019)  - A1C 8.2  - Blood sugars mildly elevated  - Stopped Lantus 10U  - ContinueGlyburide/Metformin (on [facility] $4 list)  - Continue Humalog SSI  - Patient requesting PO meds because he does not want to take insulin      Medically noncompliant (9/9/2019)    Dispo - Continue Ceftriaxone. Would benefit from placement at discharge if possible.     DIET DIABETIC CONSISTENT CARB Regular    DVT Prophylaxis: Heparin      Signed By: Km Rosales DO     September 13, 2019

## 2019-09-13 NOTE — PROGRESS NOTES
Assumed care for the pt from [address] [place] [address]. Pt in bed watching tv. Alert & oriented. Resp even & unlabored on room; lungs diminished in lower bases. HR regular. Abdomen obese & soft with active bowel sounds. Brief in place; c/d/i. Right foot dressing intact; right upper thigh intact. No c/o pain at this time. IVF infusing with no difficulty. Bed low & locked; call light in reach; no needs voiced.

## 2019-09-14 LAB
GLUCOSE BLD STRIP.AUTO-MCNC: 127 MG/DL (ref 65–100)
GLUCOSE BLD STRIP.AUTO-MCNC: 151 MG/DL (ref 65–100)
GLUCOSE BLD STRIP.AUTO-MCNC: 201 MG/DL (ref 65–100)
GLUCOSE BLD STRIP.AUTO-MCNC: 76 MG/DL (ref 65–100)

## 2019-09-14 PROCEDURE — 74011636637 HC RX REV CODE- 636/637: Performed by: SURGERY

## 2019-09-14 PROCEDURE — 74011250636 HC RX REV CODE- 250/636: Performed by: SURGERY

## 2019-09-14 PROCEDURE — 74011250637 HC RX REV CODE- 250/637: Performed by: SURGERY

## 2019-09-14 PROCEDURE — 74011250637 HC RX REV CODE- 250/637: Performed by: INTERNAL MEDICINE

## 2019-09-14 PROCEDURE — 82962 GLUCOSE BLOOD TEST: CPT

## 2019-09-14 PROCEDURE — 65270000029 HC RM PRIVATE

## 2019-09-14 RX ORDER — FLUTICASONE PROPIONATE 50 MCG
2 SPRAY, SUSPENSION (ML) NASAL DAILY
Status: DISCONTINUED | OUTPATIENT
Start: 2019-09-14 | End: 2019-09-20 | Stop reason: HOSPADM

## 2019-09-14 RX ORDER — LISINOPRIL 20 MG/1
40 TABLET ORAL DAILY
Status: DISCONTINUED | OUTPATIENT
Start: 2019-09-14 | End: 2019-09-20 | Stop reason: HOSPADM

## 2019-09-14 RX ORDER — AMLODIPINE BESYLATE 5 MG/1
5 TABLET ORAL DAILY
Status: DISCONTINUED | OUTPATIENT
Start: 2019-09-14 | End: 2019-09-20 | Stop reason: HOSPADM

## 2019-09-14 RX ADMIN — LISINOPRIL 40 MG: 20 TABLET ORAL at 15:00

## 2019-09-14 RX ADMIN — HEPARIN SODIUM 5000 UNITS: 5000 INJECTION INTRAVENOUS; SUBCUTANEOUS at 14:14

## 2019-09-14 RX ADMIN — FLUTICASONE PROPIONATE 2 SPRAY: 50 SPRAY, METERED NASAL at 15:00

## 2019-09-14 RX ADMIN — SODIUM CHLORIDE 100 ML/HR: 900 INJECTION, SOLUTION INTRAVENOUS at 05:11

## 2019-09-14 RX ADMIN — INSULIN LISPRO 2 UNITS: 100 INJECTION, SOLUTION INTRAVENOUS; SUBCUTANEOUS at 21:52

## 2019-09-14 RX ADMIN — METFORMIN HYDROCHLORIDE: 500 TABLET, FILM COATED ORAL at 09:48

## 2019-09-14 RX ADMIN — HEPARIN SODIUM 5000 UNITS: 5000 INJECTION INTRAVENOUS; SUBCUTANEOUS at 05:23

## 2019-09-14 RX ADMIN — INSULIN LISPRO 4 UNITS: 100 INJECTION, SOLUTION INTRAVENOUS; SUBCUTANEOUS at 12:41

## 2019-09-14 RX ADMIN — AMLODIPINE BESYLATE 5 MG: 5 TABLET ORAL at 15:00

## 2019-09-14 RX ADMIN — HEPARIN SODIUM 5000 UNITS: 5000 INJECTION INTRAVENOUS; SUBCUTANEOUS at 21:52

## 2019-09-14 NOTE — PROGRESS NOTES
Hospitalist Progress Note    Patient: Martin Ernandez MRN: [id number]  SSN: [id number]    YOB: 1965  Age: 49 y.o. Sex: male      Admit Date: 9/9/2019    LOS: 2 days     Subjective:     49year old male with poorly controlled DM2 and a right foot diabetic wound readmitted due to continued infection despite being on IV Ceftriaxone at home for a few days prior to \"losing\" his PICC line. He went for a STSG (skin graft) on 9/11/19. He will need Ceftriaxone through 9/19/19.    9/14 - No new acute complaints. Foot continues to feel better. Denies CP/SOB. Denies F/C/N/V. Review of systems negative except stated above. Objective:     Visit Vitals  /66 (BP 1 Location: Right arm, BP Patient Position: At rest)   Pulse 65   Temp 98 °F (36.7 °C)   Resp 18   Ht 5' 9\" (1.753 m)   Wt 95.3 kg (210 lb)   SpO2 96%   BMI 31.01 kg/m²      Oxygen Therapy  O2 Sat (%): 96 % (09/14/19 0825)  O2 Device: Room air (09/11/19 1755)      Intake and Output:     Intake/Output Summary (Last 24 hours) at 9/14/2019 0957  Last data filed at 9/14/2019 4422  Gross per 24 hour   Intake 2820 ml   Output 775 ml   Net 2045 ml         Physical Exam:   GENERAL: alert, cooperative, no distress, appears stated age  EYE: conjunctivae/corneas clear. PERRL. THROAT & NECK: normal and no erythema or exudates noted. LUNG: clear to auscultation bilaterally  HEART: regular rate and rhythm, S1S2, no murmur, no JVD  ABDOMEN: soft, non-tender, non-distended. Bowel sounds normal.   EXTREMITIES:  Right foot with extensive wound with serous drainage s/p STSG  SKIN: Right foot with extensive wound with serous drainage  NEUROLOGIC: A&Ox3. Cranial nerves 2-12 grossly intact.     Lab/Data Review:  Recent Results (from the past 24 hour(s))   GLUCOSE, POC    Collection Time: 09/13/19 11:28 AM   Result Value Ref Range    Glucose (POC) 116 (H) 65 - 100 mg/dL   GLUCOSE, POC    Collection Time: 09/13/19  4:57 PM   Result Value Ref Range    Glucose (POC) 102 (H) 65 - 100 mg/dL   GLUCOSE, POC    Collection Time: 09/13/19  9:45 PM   Result Value Ref Range    Glucose (POC) 93 65 - 100 mg/dL   GLUCOSE, POC    Collection Time: 09/14/19  7:23 AM   Result Value Ref Range    Glucose (POC) 127 (H) 65 - 100 mg/dL       Imaging:  Xr Foot Rt Min 3 V    Result Date: 9/9/2019  RIGHT FOOT SERIES HISTORY: Pain COMPARISON: August 23, 2019 FINDINGS: The fifth toe has been amputated. There is no displaced fracture, malalignment or osseous erosions. IMPRESSION: Fifth toe amputation. Xr Foot Rt Min 3 V    Result Date: 8/23/2019  History: Redness and swelling of the right foot. Pain EXAM: 3 views right foot FINDINGS: There is a question of a triangular shaped foreign body along the plantar surface of the right foot at the level of the third proximal phalanx. There is subcutaneous emphysema, raising possibility for superimposed infection. No fracture or dislocation. IMPRESSION: Findings suggestive of triangular radiopaque foreign body within the plantar soft tissues at the level of the third proximal phalanx. There is also subcutaneous emphysema seen extending from the first through fifth digits at the level of the proximal phalanges. Mri Foot Rt W Wo Cont    Result Date: 8/26/2019  HISTORY: Recent fifth toe amputation and foreign body removal. EXAM: MRI right foot with and without contrast TECHNIQUE: Multiplanar multisequence imaging is performed both before and after the uneventful administration of 20 mL Dotarem. COMPARISON: Plain films dated 8/23/2019 FINDINGS: The exam is compromised by patient motion. There is a large soft tissue defect seen along the plantar surface of the right foot. Findings compatible with the patient's given clinical history of recent surgery. The patient is status post interval removal of the fifth digit at the level of the MTP articulation. No evidence of osteomyelitis or abscess. There is edema in the plantar musculature of the right foot. IMPRESSION: 1. Large soft tissue defect with postsurgical change as described. 2. No evidence of abscess or osteomyelitis. 3. Plantar muscular edema. Findings compatible with chronic denervation change. Duplex Low Ext Arteries With Karolina    Result Date: 8/26/2019  TITLE: Lower extremity arterial ultrasound examination. INDICATION: Uncontrolled diabetes. Diabetic foot infection. TECHNIQUE: Grayscale, color, and Doppler interrogation performed. The entire length of all of the arteries of the extremity were evaluated. COMPARISON: None. SEGMENTAL BP:  The right and left lower extremity segmental blood pressures are fairly symmetric and unremarkable. KAROLINA:  Right = 1.28            Left = 1.28 RIGHT LOWER EXTREMITY:  Peak systolic velocities-- within normal limits. Normal triphasic waveform throughout. LEFT LOWER EXTREMITY:  Peak systolic velocities-- within normal limits. Mildly elevated distal popliteal artery velocity. Normal triphasic waveforms throughout. ABDOMEN:  No evidence of aneurysm. IMPRESSION:  No significant arterial stenosis identified. No results found for this visit on 09/09/19. Cultures:   All Micro Results     None          Assessment/Plan:     Principal Problem:    Diabetic ulcer of right foot associated with type 2 diabetes mellitus, with necrosis of muscle ([facility] [address] (8/24/2019)  - S/P STSG on 9/12/19  - Continue Ceftriaxone based on previous cultures  - Appreciate general surgery's input and assistance    Active Problems:    Diabetic foot infection ([address] (9/9/2019)  - Continue Ceftriaxone based on previous cultures  - Appreciate general surgery's input and assistance      Leukocytosis (9/9/2019)  - Resolved      Uncontrolled type II diabetes mellitus ([address] (8/23/2019)  - A1C 8.2  - Blood sugars mildly elevated  - Stopped Lantus 10U  - Continue Glyburide/Metformin (on [employer] $4 list)  - Continue Humalog SSI  - Patient requesting PO meds because he does not want to take insulin Medically noncompliant (9/9/2019)    Dispo - Continue Ceftriaxone. Would benefit from placement at discharge if possible.     DIET DIABETIC CONSISTENT CARB Regular  DIET NUTRITIONAL SUPPLEMENTS Dinner; Ensure High Protein    DVT Prophylaxis: Heparin      Signed By: Ling Jimenez DO     September 14, 2019

## 2019-09-14 NOTE — PROGRESS NOTES
Shift assessment done. Pt seen in bed, watching tv. Respirations even and unlabored. Denies needs and pain. Wounds dressing on R thigh and R foot, c/d/i. Safety measures provided. Will continue to monitor.

## 2019-09-14 NOTE — PROGRESS NOTES
Shift assessment completed via doc flow sheet. A/O x 4. Denies pain and other needs at this time. Dressing to right foot c/d/i. Right thigh dressing intact. All safety measures in place. Pt is instructed to call for assistance. Call light within reach.

## 2019-09-14 NOTE — PROGRESS NOTES
Problem: Diabetes Self-Management  Goal: *Disease process and treatment process  Description  Define diabetes and identify own type of diabetes; list 3 options for treating diabetes. Outcome: Progressing Towards Goal  Goal: *Incorporating nutritional management into lifestyle  Description  Describe effect of type, amount and timing of food on blood glucose; list 3 methods for planning meals. Outcome: Progressing Towards Goal  Goal: *Incorporating physical activity into lifestyle  Description  State effect of exercise on blood glucose levels. Outcome: Progressing Towards Goal  Goal: *Developing strategies to promote health/change behavior  Description  Define the ABC's of diabetes; identify appropriate screenings, schedule and personal plan for screenings. Outcome: Progressing Towards Goal  Goal: *Using medications safely  Description  State effect of diabetes medications on diabetes; name diabetes medication taking, action and side effects. Outcome: Progressing Towards Goal  Goal: *Monitoring blood glucose, interpreting and using results  Description  Identify recommended blood glucose targets  and personal targets. Outcome: Progressing Towards Goal     Problem: Falls - Risk of  Goal: *Absence of Falls  Description  Document Candida Lightning Fall Risk and appropriate interventions in the flowsheet. Outcome: Progressing Towards Goal  Note:   Fall Risk Interventions:  Mobility Interventions: Assess mobility with egress test, Patient to call before getting OOB, PT Consult for mobility concerns, PT Consult for assist device competence         Medication Interventions: Patient to call before getting OOB, Teach patient to arise slowly                   Problem: Pressure Injury - Risk of  Goal: *Prevention of pressure injury  Description  Document Gui Scale and appropriate interventions in the flowsheet.   Outcome: Progressing Towards Goal  Note:   Pressure Injury Interventions:  Sensory Interventions: Assess changes in LOC, Assess need for specialty bed, Discuss PT/OT consult with provider, Float heels, Keep linens dry and wrinkle-free    Moisture Interventions: Absorbent underpads, Apply protective barrier, creams and emollients, Assess need for specialty bed    Activity Interventions: PT/OT evaluation    Mobility Interventions: PT/OT evaluation    Nutrition Interventions: Document food/fluid/supplement intake, Offer support with meals,snacks and hydration

## 2019-09-14 NOTE — PROGRESS NOTES
Pt resting in bed. No distress noted. Dsg to right foot intact. Guaze dsg to right thigh. Some redness noted to inner side beside dressing.   Pt complains of mild head pressure/congestion

## 2019-09-14 NOTE — PROGRESS NOTES
Md notified of crackles/coarseness noted to pt' base of pts lungs as well his increased BP. Dr Gavi Quinteros to put in orders.

## 2019-09-15 PROBLEM — J06.9 URI (UPPER RESPIRATORY INFECTION): Status: ACTIVE | Noted: 2019-09-15

## 2019-09-15 LAB
GLUCOSE BLD STRIP.AUTO-MCNC: 115 MG/DL (ref 65–100)
GLUCOSE BLD STRIP.AUTO-MCNC: 148 MG/DL (ref 65–100)
GLUCOSE BLD STRIP.AUTO-MCNC: 175 MG/DL (ref 65–100)
GLUCOSE BLD STRIP.AUTO-MCNC: 83 MG/DL (ref 65–100)

## 2019-09-15 PROCEDURE — 74011250637 HC RX REV CODE- 250/637: Performed by: INTERNAL MEDICINE

## 2019-09-15 PROCEDURE — 74011636637 HC RX REV CODE- 636/637: Performed by: SURGERY

## 2019-09-15 PROCEDURE — 74011000258 HC RX REV CODE- 258: Performed by: SURGERY

## 2019-09-15 PROCEDURE — 65270000029 HC RM PRIVATE

## 2019-09-15 PROCEDURE — 74011250637 HC RX REV CODE- 250/637: Performed by: SURGERY

## 2019-09-15 PROCEDURE — 94760 N-INVAS EAR/PLS OXIMETRY 1: CPT

## 2019-09-15 PROCEDURE — 77010033678 HC OXYGEN DAILY

## 2019-09-15 PROCEDURE — 82962 GLUCOSE BLOOD TEST: CPT

## 2019-09-15 PROCEDURE — 74011250636 HC RX REV CODE- 250/636: Performed by: SURGERY

## 2019-09-15 RX ADMIN — INSULIN LISPRO 2 UNITS: 100 INJECTION, SOLUTION INTRAVENOUS; SUBCUTANEOUS at 02:00

## 2019-09-15 RX ADMIN — AMLODIPINE BESYLATE 5 MG: 5 TABLET ORAL at 09:04

## 2019-09-15 RX ADMIN — HEPARIN SODIUM 5000 UNITS: 5000 INJECTION INTRAVENOUS; SUBCUTANEOUS at 13:45

## 2019-09-15 RX ADMIN — CEFTRIAXONE 1 G: 1 INJECTION, POWDER, FOR SOLUTION INTRAMUSCULAR; INTRAVENOUS at 00:13

## 2019-09-15 RX ADMIN — LISINOPRIL 40 MG: 20 TABLET ORAL at 09:04

## 2019-09-15 RX ADMIN — CEFTRIAXONE 1 G: 1 INJECTION, POWDER, FOR SOLUTION INTRAMUSCULAR; INTRAVENOUS at 23:21

## 2019-09-15 RX ADMIN — HEPARIN SODIUM 5000 UNITS: 5000 INJECTION INTRAVENOUS; SUBCUTANEOUS at 05:59

## 2019-09-15 RX ADMIN — HEPARIN SODIUM 5000 UNITS: 5000 INJECTION INTRAVENOUS; SUBCUTANEOUS at 22:49

## 2019-09-15 RX ADMIN — FLUTICASONE PROPIONATE 2 SPRAY: 50 SPRAY, METERED NASAL at 09:04

## 2019-09-15 RX ADMIN — METFORMIN HYDROCHLORIDE: 500 TABLET, FILM COATED ORAL at 09:03

## 2019-09-15 NOTE — PROGRESS NOTES
Hospitalist Progress Note    Patient: Adrienne Ramesh MRN: [id number]  SSN: [id number]    YOB: 1965  Age: 49 y.o. Sex: male      Admit Date: 9/9/2019    LOS: 3 days     Subjective:     49year old male with poorly controlled DM2 and a right foot diabetic wound readmitted due to continued infection despite being on IV Ceftriaxone at home for a few days prior to \"losing\" his PICC line. He went for a STSG (skin graft) on 9/11/19. He will need Ceftriaxone through 9/19/19.    9/15 - Complains of nasal congestion and drainage. Foot continues to feel better. Denies CP/SOB. Denies F/C/N/V. Review of systems negative except stated above. Objective:     Visit Vitals  /73 (BP 1 Location: Right arm, BP Patient Position: At rest)   Pulse 63   Temp 97.9 °F (36.6 °C)   Resp 18   Ht 5' 9\" (1.753 m)   Wt 95.3 kg (210 lb)   SpO2 97%   BMI 31.01 kg/m²      Oxygen Therapy  O2 Sat (%): 97 % (09/15/19 0740)  O2 Device: Room air (09/11/19 1755)      Intake and Output:     Intake/Output Summary (Last 24 hours) at 9/15/2019 1043  Last data filed at 9/15/2019 0648  Gross per 24 hour   Intake --   Output 1100 ml   Net -1100 ml         Physical Exam:   GENERAL: alert, cooperative, no distress, appears stated age  EYE: conjunctivae/corneas clear. PERRL. THROAT & NECK: normal and no erythema or exudates noted. LUNG: clear to auscultation bilaterally  HEART: regular rate and rhythm, S1S2, no murmur, no JVD  ABDOMEN: soft, non-tender, non-distended. Bowel sounds normal.   EXTREMITIES:  Right foot with extensive wound with serous drainage s/p STSG  SKIN: Right foot with extensive wound with serous drainage  NEUROLOGIC: A&Ox3. Cranial nerves 2-12 grossly intact.     Lab/Data Review:  Recent Results (from the past 24 hour(s))   GLUCOSE, POC    Collection Time: 09/14/19 11:35 AM   Result Value Ref Range    Glucose (POC) 201 (H) 65 - 100 mg/dL   GLUCOSE, POC    Collection Time: 09/14/19  5:21 PM   Result Value Ref Range Glucose (POC) 76 65 - 100 mg/dL   GLUCOSE, POC    Collection Time: 09/14/19  9:49 PM   Result Value Ref Range    Glucose (POC) 151 (H) 65 - 100 mg/dL   GLUCOSE, POC    Collection Time: 09/15/19  7:14 AM   Result Value Ref Range    Glucose (POC) 115 (H) 65 - 100 mg/dL       Imaging:  Xr Foot Rt Min 3 V    Result Date: 9/9/2019  RIGHT FOOT SERIES HISTORY: Pain COMPARISON: August 23, 2019 FINDINGS: The fifth toe has been amputated. There is no displaced fracture, malalignment or osseous erosions. IMPRESSION: Fifth toe amputation. Xr Foot Rt Min 3 V    Result Date: 8/23/2019  History: Redness and swelling of the right foot. Pain EXAM: 3 views right foot FINDINGS: There is a question of a triangular shaped foreign body along the plantar surface of the right foot at the level of the third proximal phalanx. There is subcutaneous emphysema, raising possibility for superimposed infection. No fracture or dislocation. IMPRESSION: Findings suggestive of triangular radiopaque foreign body within the plantar soft tissues at the level of the third proximal phalanx. There is also subcutaneous emphysema seen extending from the first through fifth digits at the level of the proximal phalanges. Mri Foot Rt W Wo Cont    Result Date: 8/26/2019  HISTORY: Recent fifth toe amputation and foreign body removal. EXAM: MRI right foot with and without contrast TECHNIQUE: Multiplanar multisequence imaging is performed both before and after the uneventful administration of 20 mL Dotarem. COMPARISON: Plain films dated 8/23/2019 FINDINGS: The exam is compromised by patient motion. There is a large soft tissue defect seen along the plantar surface of the right foot. Findings compatible with the patient's given clinical history of recent surgery. The patient is status post interval removal of the fifth digit at the level of the MTP articulation. No evidence of osteomyelitis or abscess.  There is edema in the plantar musculature of the right foot. IMPRESSION: 1. Large soft tissue defect with postsurgical change as described. 2. No evidence of abscess or osteomyelitis. 3. Plantar muscular edema. Findings compatible with chronic denervation change. Duplex Low Ext Arteries With Karolina    Result Date: 8/26/2019  TITLE: Lower extremity arterial ultrasound examination. INDICATION: Uncontrolled diabetes. Diabetic foot infection. TECHNIQUE: Grayscale, color, and Doppler interrogation performed. The entire length of all of the arteries of the extremity were evaluated. COMPARISON: None. SEGMENTAL BP:  The right and left lower extremity segmental blood pressures are fairly symmetric and unremarkable. KAROLINA:  Right = 1.28            Left = 1.28 RIGHT LOWER EXTREMITY:  Peak systolic velocities-- within normal limits. Normal triphasic waveform throughout. LEFT LOWER EXTREMITY:  Peak systolic velocities-- within normal limits. Mildly elevated distal popliteal artery velocity. Normal triphasic waveforms throughout. ABDOMEN:  No evidence of aneurysm. IMPRESSION:  No significant arterial stenosis identified. No results found for this visit on 09/09/19. Cultures:   All Micro Results     None          Assessment/Plan:     Principal Problem:    Diabetic ulcer of right foot associated with type 2 diabetes mellitus, with necrosis of muscle ([facility] [address] (8/24/2019)  - S/P STSG on 9/12/19  - Continue Ceftriaxone based on previous cultures  - Appreciate general surgery's input and assistance    Active Problems:    Diabetic foot infection ([address] (9/9/2019)  - Continue Ceftriaxone based on previous cultures  - Appreciate general surgery's input and assistance      Leukocytosis (9/9/2019)  - Resolved      Uncontrolled type II diabetes mellitus ([address] (8/23/2019)  - A1C 8.2  - Blood sugars mildly elevated  - Stopped Lantus 10U  - Continue Glyburide/Metformin (on [facility] $4 list)  - Continue Humalog SSI  - Patient requesting PO meds because he does not want to take insulin      URI (9/15/2019)  - Started Flonase  - Start nasal saline PRN      Medically noncompliant (9/9/2019)      Self Neglect (9/13/2019)    Dispo - Continue Ceftriaxone. Would benefit from placement at discharge if possible.     DIET DIABETIC CONSISTENT CARB Regular  DIET NUTRITIONAL SUPPLEMENTS Dinner; Ensure High Protein    DVT Prophylaxis: Heparin      Signed By: Hermelinda Salcido DO     September 15, 2019

## 2019-09-15 NOTE — PROGRESS NOTES
Blood sugar of 151. Instructed patient to self inject his insulin. Patient agreed and was eager to do it himself.

## 2019-09-15 NOTE — PROGRESS NOTES
Pt with no new complaints this morning. Reviewed diet and activity. Reinforced right thigh dressing. Foot dressing is intact; not to be removed. Dressings per Dr Carmine Mckeon. Continue with self care and diabetes teaching.

## 2019-09-16 PROBLEM — S71.101A OPEN WOUND OF RIGHT THIGH: Status: ACTIVE | Noted: 2019-09-16

## 2019-09-16 LAB
ANION GAP SERPL CALC-SCNC: 5 MMOL/L (ref 7–16)
BUN SERPL-MCNC: 19 MG/DL (ref 6–23)
CALCIUM SERPL-MCNC: 9.3 MG/DL (ref 8.3–10.4)
CHLORIDE SERPL-SCNC: 101 MMOL/L (ref 98–107)
CO2 SERPL-SCNC: 28 MMOL/L (ref 21–32)
CREAT SERPL-MCNC: 0.92 MG/DL (ref 0.8–1.5)
ERYTHROCYTE [DISTWIDTH] IN BLOOD BY AUTOMATED COUNT: 13.2 % (ref 11.9–14.6)
GLUCOSE BLD STRIP.AUTO-MCNC: 104 MG/DL (ref 65–100)
GLUCOSE BLD STRIP.AUTO-MCNC: 137 MG/DL (ref 65–100)
GLUCOSE BLD STRIP.AUTO-MCNC: 137 MG/DL (ref 65–100)
GLUCOSE BLD STRIP.AUTO-MCNC: 155 MG/DL (ref 65–100)
GLUCOSE SERPL-MCNC: 147 MG/DL (ref 65–100)
HCT VFR BLD AUTO: 33.5 % (ref 41.1–50.3)
HGB BLD-MCNC: 11 G/DL (ref 13.6–17.2)
MCH RBC QN AUTO: 28.1 PG (ref 26.1–32.9)
MCHC RBC AUTO-ENTMCNC: 32.8 G/DL (ref 31.4–35)
MCV RBC AUTO: 85.5 FL (ref 79.6–97.8)
NRBC # BLD: 0 K/UL (ref 0–0.2)
PLATELET # BLD AUTO: 192 K/UL (ref 150–450)
PMV BLD AUTO: 10.7 FL (ref 9.4–12.3)
POTASSIUM SERPL-SCNC: 4 MMOL/L (ref 3.5–5.1)
RBC # BLD AUTO: 3.92 M/UL (ref 4.23–5.6)
SODIUM SERPL-SCNC: 134 MMOL/L (ref 136–145)
WBC # BLD AUTO: 7.2 K/UL (ref 4.3–11.1)

## 2019-09-16 PROCEDURE — 36415 COLL VENOUS BLD VENIPUNCTURE: CPT

## 2019-09-16 PROCEDURE — 0HRHX74 REPLACEMENT OF RIGHT UPPER LEG SKIN WITH AUTOLOGOUS TISSUE SUBSTITUTE, PARTIAL THICKNESS, EXTERNAL APPROACH: ICD-10-PCS | Performed by: SURGERY

## 2019-09-16 PROCEDURE — 80048 BASIC METABOLIC PNL TOTAL CA: CPT

## 2019-09-16 PROCEDURE — 82962 GLUCOSE BLOOD TEST: CPT

## 2019-09-16 PROCEDURE — 74011250637 HC RX REV CODE- 250/637: Performed by: SURGERY

## 2019-09-16 PROCEDURE — 74011250637 HC RX REV CODE- 250/637: Performed by: INTERNAL MEDICINE

## 2019-09-16 PROCEDURE — 74011636637 HC RX REV CODE- 636/637: Performed by: SURGERY

## 2019-09-16 PROCEDURE — 85027 COMPLETE CBC AUTOMATED: CPT

## 2019-09-16 PROCEDURE — 65270000029 HC RM PRIVATE

## 2019-09-16 PROCEDURE — 74011250636 HC RX REV CODE- 250/636: Performed by: SURGERY

## 2019-09-16 RX ADMIN — HEPARIN SODIUM 5000 UNITS: 5000 INJECTION INTRAVENOUS; SUBCUTANEOUS at 21:28

## 2019-09-16 RX ADMIN — FLUTICASONE PROPIONATE 2 SPRAY: 50 SPRAY, METERED NASAL at 09:42

## 2019-09-16 RX ADMIN — LISINOPRIL 40 MG: 20 TABLET ORAL at 09:42

## 2019-09-16 RX ADMIN — AMLODIPINE BESYLATE 5 MG: 5 TABLET ORAL at 09:42

## 2019-09-16 RX ADMIN — HEPARIN SODIUM 5000 UNITS: 5000 INJECTION INTRAVENOUS; SUBCUTANEOUS at 14:00

## 2019-09-16 RX ADMIN — HEPARIN SODIUM 5000 UNITS: 5000 INJECTION INTRAVENOUS; SUBCUTANEOUS at 05:20

## 2019-09-16 RX ADMIN — INSULIN LISPRO 2 UNITS: 100 INJECTION, SOLUTION INTRAVENOUS; SUBCUTANEOUS at 09:43

## 2019-09-16 RX ADMIN — METFORMIN HYDROCHLORIDE: 500 TABLET, FILM COATED ORAL at 09:42

## 2019-09-16 NOTE — OP NOTES
[id number] [address]  OPERATIVE REPORT    Name:  Rehana Ludwig  MR#:  [id number]  :  1965  ACCOUNT #:  [de-identified]  DATE OF SERVICE:  2019    PREOPERATIVE DIAGNOSES:  1. Chronic right foot wound. 2.  Status post right fifth toe amputation. 3.  Uncontrolled diabetes. 4.  Self-neglect. 5.  Medical noncompliance. POSTOPERATIVE DIAGNOSES:  1. Chronic right foot wound. 2.  Status post right fifth toe amputation. 3.  Uncontrolled diabetes. 4.  Self-neglect. 5.  Medical noncompliance. PROCEDURE PERFORMED:  1. Dermal autograft to right fourth toe flexor tendon; 1 sq. cm. (CPT 09822). 2.  Split-thickness skin autograft to right foot wound; 35 sq. cm. (CPT 12183-48). 3.  Surgical wound preparation for grafting of right foot wound; 36 sq. cm. (CPT 09944-94). 4.  Separate Site  - 70 sq. cm. advancement flap closure of right thigh graft harvest site at a separate site (CPT 22089-75, 60277+). SURGEON:  Amie Crane MD    ASSISTANT:  None. ANESTHESIA:  General.    COMPLICATIONS:  None. SPECIMENS REMOVED:  None. IMPLANTS:  None. ESTIMATED BLOOD LOSS:  Less than 30 mL. OPERATIVE PROCEDURE:  The patient was taken to the operating room, placed in the supine position and after adequate anesthesia was given, his right thigh and right foot were prepped and draped in a sterile fashion with multiple layers of Betadine scrub and Betadine solution. A time-out protocol was followed. He was on standing antibiotics. We first measured and assessed the wound size of the right foot. In order to prevent contamination, I went to the thigh first and hand-harvested a split-thickness jeimy-shaped skin graft from the right thigh. We harvested approximately 40 sq. cm. of split-thickness skin graft and fenestrated the graft in situ as it was being harvested. We also hand-harvested dermis from the base of the wound.   We then performed an advancement flap closure of the thigh graft harvest site, which required us to first excise all of the remaining dermis from the base of the wound. In doing so, this gave us access to the skin edges medially and laterally and allowed us to undermine advancement flaps of the wound to create a primary and secondary wound defect of about 70 sq. cm., which was required to get primary closure of this wound. The advancement flaps were irrigated. A local anesthetic was given. They were brought back together into apposition with retention sutures of 2-0 nylon. Skin clips and additional nylon sutures were placed between these retention sutures for complete closure of the advancement flaps. With this closure, we created a longitudinally-oriented completely closed thigh graft wound for better cosmesis, quicker healing, less risk of infection and less postoperative pain. This was covered with dry 4x4s and Tegaderms. We then turned our attention to the right foot. We surgically prepared the right foot wound first for grafting by using a #15 scalpel to excise the entire wound base and all of the biofilm and irreversibly ischemic subcutaneous adipose and tendon. There was a significant amount of flexor tendon of the fourth toe, which was exposed and not ready for autologous split-thickness skin grafting. To this area, we used the dermis and performed a dermal autograft of about 1 sq. cm. to cover this tendon. The remainder of the wound had a split-thickness skin graft placed over and the graft was secured peripherally with skin clips. Fenestrations had already been cut in the split-thickness skin graft and we grafted about 35 sq. cm. of split-thickness skin graft to completely cover the wound. The grafts were covered with Adaptic and then 4x4s and wrapped with Kerlix and Coban to hold it in place. The patient was taken to the recovery room in good condition. He tolerated the procedure well. There were no immediate complications.       [address] [facility], MD [id number]  D:  09/15/2019 20:39  T:  09/16/2019 2:59  JOB #:  [id number]

## 2019-09-16 NOTE — PROGRESS NOTES
Discussed patient with JOANN who state his Medicaid application was submitted 8/27 however he is applying for disability and that takes a longer time. Medicaid has between  days to approve or not and that count starts from the date of his appointment which was made for 10/1/19. Medicaid folks have notes in their system that they tried to call patient with the appointment and had to leave a message. They have also sent him a letter. Report to Pratima POSADAS who will get patient to call Medicaid from bedside.

## 2019-09-16 NOTE — PROGRESS NOTES
Hospitalist Progress Note    Patient: Mayela Velasquez MRN: [id number]  SSN: [id number]    YOB: 1965  Age: 49 y.o. Sex: male      Admit Date: 9/9/2019    LOS: 4 days     Subjective:     49year old male with poorly controlled DM2 and a right foot diabetic wound readmitted due to continued infection despite being on IV Ceftriaxone at home for a few days prior to \"losing\" his PICC line. He went for a STSG (skin graft) on 9/11/19. He will need Ceftriaxone through 9/19/19.    9/16 - Feels better today. Nasal congestion improved. Foot pain improved. Denies CP/SOB. Denies F/C/N/V. Review of systems negative except stated above. Objective:     Visit Vitals  /70 (BP 1 Location: Right arm, BP Patient Position: At rest)   Pulse 68   Temp 98.2 °F (36.8 °C)   Resp 18   Ht 5' 9\" (1.753 m)   Wt 95.3 kg (210 lb)   SpO2 97%   BMI 31.01 kg/m²      Oxygen Therapy  O2 Sat (%): 97 % (09/16/19 0606)  O2 Device: Room air (09/15/19 0740)      Intake and Output:     Intake/Output Summary (Last 24 hours) at 9/16/2019 1011  Last data filed at 9/16/2019 0953  Gross per 24 hour   Intake 480 ml   Output 1550 ml   Net -1070 ml         Physical Exam:   GENERAL: alert, cooperative, no distress, appears stated age  EYE: conjunctivae/corneas clear. PERRL. THROAT & NECK: normal and no erythema or exudates noted. LUNG: clear to auscultation bilaterally  HEART: regular rate and rhythm, S1S2, no murmur, no JVD  ABDOMEN: soft, non-tender, non-distended. Bowel sounds normal.   EXTREMITIES:  Right foot with extensive wound with serous drainage s/p STSG  SKIN: Right foot with extensive wound with serous drainage  NEUROLOGIC: A&Ox3. Cranial nerves 2-12 grossly intact.     Lab/Data Review:  Recent Results (from the past 24 hour(s))   GLUCOSE, POC    Collection Time: 09/15/19 12:18 PM   Result Value Ref Range    Glucose (POC) 175 (H) 65 - 100 mg/dL   GLUCOSE, POC    Collection Time: 09/15/19  4:27 PM   Result Value Ref Range Glucose (POC) 83 65 - 100 mg/dL   GLUCOSE, POC    Collection Time: 09/15/19  8:34 PM   Result Value Ref Range    Glucose (POC) 148 (H) 65 - 100 mg/dL   CBC W/O DIFF    Collection Time: 09/16/19  4:38 AM   Result Value Ref Range    WBC 7.2 4.3 - 11.1 K/uL    RBC 3.92 (L) 4.23 - 5.6 M/uL    HGB 11.0 (L) 13.6 - 17.2 g/dL    HCT 33.5 (L) 41.1 - 50.3 %    MCV 85.5 79.6 - 97.8 FL    MCH 28.1 26.1 - 32.9 PG    MCHC 32.8 31.4 - 35.0 g/dL    RDW 13.2 11.9 - 14.6 %    PLATELET 683 402 - 161 K/uL    MPV 10.7 9.4 - 12.3 FL    ABSOLUTE NRBC 0.00 0.0 - 0.2 K/uL   METABOLIC PANEL, BASIC    Collection Time: 09/16/19  4:38 AM   Result Value Ref Range    Sodium 134 (L) 136 - 145 mmol/L    Potassium 4.0 3.5 - 5.1 mmol/L    Chloride 101 98 - 107 mmol/L    CO2 28 21 - 32 mmol/L    Anion gap 5 (L) 7 - 16 mmol/L    Glucose 147 (H) 65 - 100 mg/dL    BUN 19 6 - 23 MG/DL    Creatinine 0.92 0.8 - 1.5 MG/DL    GFR est AA >60 >60 ml/min/1.73m2    GFR est non-AA >60 >60 ml/min/1.73m2    Calcium 9.3 8.3 - 10.4 MG/DL   GLUCOSE, POC    Collection Time: 09/16/19  7:17 AM   Result Value Ref Range    Glucose (POC) 155 (H) 65 - 100 mg/dL       Imaging:  Xr Foot Rt Min 3 V    Result Date: 9/9/2019  RIGHT FOOT SERIES HISTORY: Pain COMPARISON: August 23, 2019 FINDINGS: The fifth toe has been amputated. There is no displaced fracture, malalignment or osseous erosions. IMPRESSION: Fifth toe amputation. Xr Foot Rt Min 3 V    Result Date: 8/23/2019  History: Redness and swelling of the right foot. Pain EXAM: 3 views right foot FINDINGS: There is a question of a triangular shaped foreign body along the plantar surface of the right foot at the level of the third proximal phalanx. There is subcutaneous emphysema, raising possibility for superimposed infection. No fracture or dislocation. IMPRESSION: Findings suggestive of triangular radiopaque foreign body within the plantar soft tissues at the level of the third proximal phalanx.  There is also subcutaneous emphysema seen extending from the first through fifth digits at the level of the proximal phalanges. Mri Foot Rt W Wo Cont    Result Date: 8/26/2019  HISTORY: Recent fifth toe amputation and foreign body removal. EXAM: MRI right foot with and without contrast TECHNIQUE: Multiplanar multisequence imaging is performed both before and after the uneventful administration of 20 mL Dotarem. COMPARISON: Plain films dated 8/23/2019 FINDINGS: The exam is compromised by patient motion. There is a large soft tissue defect seen along the plantar surface of the right foot. Findings compatible with the patient's given clinical history of recent surgery. The patient is status post interval removal of the fifth digit at the level of the MTP articulation. No evidence of osteomyelitis or abscess. There is edema in the plantar musculature of the right foot. IMPRESSION: 1. Large soft tissue defect with postsurgical change as described. 2. No evidence of abscess or osteomyelitis. 3. Plantar muscular edema. Findings compatible with chronic denervation change. Duplex Low Ext Arteries With Karolina    Result Date: 8/26/2019  TITLE: Lower extremity arterial ultrasound examination. INDICATION: Uncontrolled diabetes. Diabetic foot infection. TECHNIQUE: Grayscale, color, and Doppler interrogation performed. The entire length of all of the arteries of the extremity were evaluated. COMPARISON: None. SEGMENTAL BP:  The right and left lower extremity segmental blood pressures are fairly symmetric and unremarkable. KAROLINA:  Right = 1.28            Left = 1.28 RIGHT LOWER EXTREMITY:  Peak systolic velocities-- within normal limits. Normal triphasic waveform throughout. LEFT LOWER EXTREMITY:  Peak systolic velocities-- within normal limits. Mildly elevated distal popliteal artery velocity. Normal triphasic waveforms throughout. ABDOMEN:  No evidence of aneurysm. IMPRESSION:  No significant arterial stenosis identified.       No results found for this visit on 09/09/19. Cultures: All Micro Results     None          Assessment/Plan:     Principal Problem:    Diabetic ulcer of right foot associated with type 2 diabetes mellitus, with necrosis of muscle ([address] (8/24/2019)  - S/P STSG on 9/12/19  - Continue Ceftriaxone based on previous cultures  - Appreciate general surgery's input and assistance    Active Problems:    Diabetic foot infection ([address] (9/9/2019)  - Continue Ceftriaxone based on previous cultures  - Appreciate general surgery's input and assistance      Leukocytosis (9/9/2019)  - Resolved      Uncontrolled type II diabetes mellitus ([facility] [address] (8/23/2019)  - A1C 8.2  - Blood sugars mildly elevated  - Stopped Lantus 10U  - Continue Glyburide/Metformin (on [employer] $4 list)  - Continue Humalog SSI  - Patient requesting PO meds because he does not want to take insulin      URI (9/15/2019)  - Started Flonase  - Continue nasal saline PRN      Medically noncompliant (9/9/2019)      Self Neglect (9/13/2019)    Dispo - Continue Ceftriaxone - EOT 9/19/19. Would benefit from placement at discharge if possible.     DIET DIABETIC CONSISTENT CARB Regular  DIET NUTRITIONAL SUPPLEMENTS Dinner; Ensure High Protein    DVT Prophylaxis: Heparin      Signed By: Mary Beth Patel DO     September 16, 2019

## 2019-09-16 NOTE — PROGRESS NOTES
Dr. Eagle Sibley to patient's bedside to change dressing to left foot. Old dressings removed and redressed by Dr. Eagle Sibley. Patient notes no complaints of pain or discomfort. Will continue to follow patient through hourly rounding. Will meet all patient's needs as requested and appropriate for patient's care.

## 2019-09-16 NOTE — PROGRESS NOTES
Problem: Diabetes Self-Management  Goal: *Disease process and treatment process  Description  Define diabetes and identify own type of diabetes; list 3 options for treating diabetes. Outcome: Progressing Towards Goal  Goal: *Incorporating nutritional management into lifestyle  Description  Describe effect of type, amount and timing of food on blood glucose; list 3 methods for planning meals. Outcome: Progressing Towards Goal  Goal: *Incorporating physical activity into lifestyle  Description  State effect of exercise on blood glucose levels. Outcome: Progressing Towards Goal  Goal: *Developing strategies to promote health/change behavior  Description  Define the ABC's of diabetes; identify appropriate screenings, schedule and personal plan for screenings. Outcome: Progressing Towards Goal  Goal: *Using medications safely  Description  State effect of diabetes medications on diabetes; name diabetes medication taking, action and side effects. Outcome: Progressing Towards Goal  Goal: *Monitoring blood glucose, interpreting and using results  Description  Identify recommended blood glucose targets  and personal targets. Outcome: Progressing Towards Goal  Goal: *Prevention, detection, treatment of acute complications  Description  List symptoms of hyper- and hypoglycemia; describe how to treat low blood sugar and actions for lowering  high blood glucose level. Outcome: Progressing Towards Goal  Goal: *Prevention, detection and treatment of chronic complications  Description  Define the natural course of diabetes and describe the relationship of blood glucose levels to long term complications of diabetes.   Outcome: Progressing Towards Goal  Goal: *Developing strategies to address psychosocial issues  Description  Describe feelings about living with diabetes; identify support needed and support network  Outcome: Progressing Towards Goal  Goal: *Insulin pump training  Outcome: Progressing Towards Goal  Goal: *Sick day guidelines  Outcome: Progressing Towards Goal  Goal: *Patient Specific Goal (EDIT GOAL, INSERT TEXT)  Outcome: Progressing Towards Goal     Problem: Patient Education: Go to Patient Education Activity  Goal: Patient/Family Education  Outcome: Progressing Towards Goal     Problem: Falls - Risk of  Goal: *Absence of Falls  Description  Document Roxann Zhou Fall Risk and appropriate interventions in the flowsheet. Outcome: Progressing Towards Goal  Note:   Fall Risk Interventions:  Mobility Interventions: PT Consult for mobility concerns         Medication Interventions: Teach patient to arise slowly                   Problem: Patient Education: Go to Patient Education Activity  Goal: Patient/Family Education  Outcome: Progressing Towards Goal     Problem: Pressure Injury - Risk of  Goal: *Prevention of pressure injury  Description  Document Gui Scale and appropriate interventions in the flowsheet.   Outcome: Progressing Towards Goal  Note:   Pressure Injury Interventions:  Sensory Interventions: Assess need for specialty bed    Moisture Interventions: Absorbent underpads, Apply protective barrier, creams and emollients    Activity Interventions: PT/OT evaluation    Mobility Interventions: Assess need for specialty bed    Nutrition Interventions: Document food/fluid/supplement intake    Friction and Shear Interventions: Lift sheet                Problem: Patient Education: Go to Patient Education Activity  Goal: Patient/Family Education  Outcome: Progressing Towards Goal     Problem: Nutrition Deficit  Goal: *Optimize nutritional status  Outcome: Progressing Towards Goal

## 2019-09-16 NOTE — PROGRESS NOTES
H&P/Consult Note/Progress Note/Office Note:   Martin Ernandez  MRN: [id number]  :1965  Age:54 y.o.    HPI: Martin Ernandez is a 49 y.o. male who was admitted on 19 from the ER by the Hospitalists with a diabetic right foot infection. He is s/p multiple right foot abscess drainages, removal of glass foreign body and right 5th toe amputation on 19. He was re-admitted for self-neglect and failure of outpt management. ER recently noted much dirt, hair and cigarette butt in his wound. He is a diabetic with no meds, transportation, or resources to gets meds. His home is scheduled to be condemned in approx 21 days  [address] refuses to go to his home and they say it is unsafe environment    He is s/p STSG and dermal autografts from right thigh to right foot on 19. He is non-weight bearing on right foot. Prior to his initial admission he reported progressive, constant, severe right foot pain for at least 2 days. He was walking barefoot. He had associated low grade fever. He was placed on IV Abx by Hospitalists  He has h/o DM and non-compliance (No primary care doctor, not on DM meds)  He was not taking his insulin because \"no one showed me how\"  Bld Cxs from admission grew gm + cocci; Wound cultures from [place] grew pan-sensitive Strep and Staph Aureus   Xray as below    Surgery recommended inpt care or SNF but he had no insurance   helped him apply [facility]      19 Xray right foot, 3 views  Triangular radiopaque foreign body within the plantar soft tissues at the level of the third proximal phalanx. Subq emphysema seen extending from the first through fifth digits at the level of the proximal phalanges        19 Arterial duplex  The right and left lower extremity segmental blood pressures are fairly symmetric and unremarkable. KAROLINA:  Right = 1.28              Left = 1.28     RIGHT LOWER EXTREMITY:  Peak systolic velocities-- within normal limits.    Normal triphasic waveform throughout.     LEFT LOWER EXTREMITY:  Peak systolic velocities-- within normal limits. Mildly  elevated distal popliteal artery velocity.  Normal triphasic waveforms throughout.     ABDOMEN:  No evidence of aneurysm.     IMPRESSION:  No significant arterial stenosis identified.             8/26/19 POD1 right foot debridement, abscess drainage, 5th toe amputation, and FB removal  8/27/19 POD2 No sign arterial stenosis on non-invasive vasc study   8/28/19 POD3 cultures from OR with alpha Strep  8/29/19 POD4 On IV Vanc; daily dressing changes; off-loading; case management working on medicaid and SNF placement  8/30/19 POD5; ON IV ceftriaxone per ID until 9/8/19 8/31/19 POD6; IV ceftriaxone planned until 9/8/19; no weight bearing right foot    9/9/19 Arrived in ER for re-admission  9/11/19 OR today for STSG  9/12/19 POD1 STSG and dermal autografts right thigh to right foot wound  9/16/19 POD5 dressing changes today; flap on thigh healing well; grafts on foot 100% take so far          Past Medical History:   Diagnosis Date    Diabetes ([facility] [address]      Past Surgical History:   Procedure Laterality Date    HX CHOLECYSTECTOMY       Current Facility-Administered Medications   Medication Dose Route Frequency    sodium chloride (OCEAN) 0.65 % nasal squeeze bottle 2 Spray  2 Spray Both Nostrils PRN    lisinopril (PRINIVIL, ZESTRIL) tablet 40 mg  40 mg Oral DAILY    amLODIPine (NORVASC) tablet 5 mg  5 mg Oral DAILY    fluticasone propionate (FLONASE) 50 mcg/actuation nasal spray 2 Spray  2 Spray Both Nostrils DAILY    hydrALAZINE (APRESOLINE) 20 mg/mL injection 10 mg  10 mg IntraVENous Q6H PRN    glyBURIDE/metFORMIN (GLUCOVANCE) 2.5/500 mg   Oral DAILY WITH BREAKFAST    dextrose 40% (GLUTOSE) oral gel 1 Tube  15 g Oral PRN    glucagon (GLUCAGEN) injection 1 mg  1 mg IntraMUSCular PRN    dextrose (D50W) injection syrg 12.5-25 g  25-50 mL IntraVENous PRN    insulin lispro (HUMALOG) injection   SubCUTAneous AC&HS  acetaminophen (TYLENOL) tablet 650 mg  650 mg Oral Q4H PRN    ondansetron (ZOFRAN) injection 4 mg  4 mg IntraVENous Q4H PRN    heparin (porcine) injection 5,000 Units  5,000 Units SubCUTAneous Q8H    bisacodyl (DULCOLAX) tablet 5 mg  5 mg Oral DAILY PRN    diphenhydrAMINE (BENADRYL) capsule 25 mg  25 mg Oral Q6H PRN    cefTRIAXone (ROCEPHIN) 1 g in 0.9% sodium chloride (MBP/ADV) 50 mL  1 g IntraVENous Q24H     Patient has no known allergies. Social History     Socioeconomic History    Marital status: SINGLE     Spouse name: Not on file    Number of children: Not on file    Years of education: Not on file    Highest education level: Not on file   Tobacco Use    Smoking status: Current Every Day Smoker    Smokeless tobacco: Never Used   Substance and Sexual Activity    Alcohol use: No    Sexual activity: Never     Social History     Tobacco Use   Smoking Status Current Every Day Smoker   Smokeless Tobacco Never Used     History reviewed. No pertinent family history. ROS: The patient has no difficulty with chest pain or shortness of breath. No fever or chills. Comprehensive review of systems was otherwise unremarkable except as noted above. Physical Exam:   Visit Vitals  /70 (BP 1 Location: Right arm, BP Patient Position: At rest)   Pulse 68   Temp 98.2 °F (36.8 °C)   Resp 18   Ht 5' 9\" (1.753 m)   Wt 210 lb (95.3 kg)   SpO2 97%   BMI 31.01 kg/m²     Vitals:    09/15/19 1922 09/15/19 2319 09/16/19 0309 09/16/19 0606   BP: 144/75 171/76 139/69 132/70   Pulse: 72 71 64 68   Resp: 17 18 16 18   Temp: 98.9 °F (37.2 °C) 98.4 °F (36.9 °C) 98.4 °F (36.9 °C) 98.2 °F (36.8 °C)   SpO2: 96% 97% 96% 97%   Weight:       Height:         09/16 0701 - 09/16 1900  In: -   Out: 600 [Urine:600]  09/14 1901 - 09/16 0700  In: 480 [P.O.:480]  Out: 2050 [Urine:2050]    Constitutional: Alert, oriented, cooperative patient in no acute distress; appears stated age    Eyes:Sclera are clear.   ENMT: no external lesions gross hearing normal; no obvious neck masses, no ear or lip lesions, nares normal  CV: RRR. Normal perfusion  Resp: Breathing is  non-labored; no audible wheezing. GI: soft and non-distended     Musculoskeletal: unremarkable with normal function. No embolic signs or cyanosis. Right 5th toe amputation. Right foot wrapped following grafting on 9/11/19  Right thigh harvest site flap closure dressing dry and intact  No fluctuance; No crepitus, +edema      Neuro:  Oriented; moves all 4; no focal deficits  Psychiatric: normal affect and mood, no memory impairment                                Recent vitals (if inpt):  Patient Vitals for the past 24 hrs:   BP Temp Pulse Resp SpO2   09/16/19 0606 132/70 98.2 °F (36.8 °C) 68 18 97 %   09/16/19 0309 139/69 98.4 °F (36.9 °C) 64 16 96 %   09/15/19 2319 171/76 98.4 °F (36.9 °C) 71 18 97 %   09/15/19 1922 144/75 98.9 °F (37.2 °C) 72 17 96 %   09/15/19 1534 143/82 98 °F (36.7 °C) 72 18 95 %       Labs:  Recent Labs     09/16/19  0438   WBC 7.2   HGB 11.0*      *   K 4.0      CO2 28   BUN 19   CREA 0.92   *       Lab Results   Component Value Date/Time    WBC 7.2 09/16/2019 04:38 AM    HGB 11.0 (L) 09/16/2019 04:38 AM    PLATELET 410 51/57/8995 04:38 AM    Sodium 134 (L) 09/16/2019 04:38 AM    Potassium 4.0 09/16/2019 04:38 AM    Chloride 101 09/16/2019 04:38 AM    CO2 28 09/16/2019 04:38 AM    BUN 19 09/16/2019 04:38 AM    Creatinine 0.92 09/16/2019 04:38 AM    Glucose 147 (H) 09/16/2019 04:38 AM    Bilirubin, total 0.7 09/09/2019 08:24 PM    AST (SGOT) 25 09/09/2019 08:24 PM    ALT (SGPT) 31 09/09/2019 08:24 PM    Alk. phosphatase 112 09/09/2019 08:24 PM       CT Results  (Last 48 hours)    None        chest X-ray      I reviewed recent labs, recent radiologic studies, and pertinent records including other doctor notes if needed. I independently reviewed radiology images for studies I described above or studies I have ordered. Admission date (for inpatients): 9/9/2019   * No surgery found *  Procedure(s):  SPLIT THICKNESS SKIN GRAFT FROM RIGHT THIGH TO RIGHT FOOT    ASSESSMENT/PLAN:  Problem List  Date Reviewed: 9/9/2019          Codes Class Noted    URI (upper respiratory infection) ICD-10-CM: J06.9  ICD-9-CM: 465.9  9/15/2019        Self neglect ICD-10-CM: R46.89  ICD-9-CM: V40.39  9/11/2019        Medically noncompliant ICD-10-CM: Z91.19  ICD-9-CM: V15.81  9/9/2019        Leukocytosis ICD-10-CM: O03.853  ICD-9-CM: 288.60  9/9/2019        Diabetic foot infection ([facility] ICD-10-CM: E11.628, L08.9  ICD-9-CM: 250.80, 686.9  9/9/2019        * (Principal) Diabetic ulcer of right foot associated with type 2 diabetes mellitus, with necrosis of muscle ([facility] ICD-10-CM: E11.621, L97.513  ICD-9-CM: 250.80, 707.15  8/24/2019    Overview Addendum 9/16/2019  7:09 AM by Michelle Wang MD     9/11/19 s/p Autologous STSG and dermal autografts from right thigh to right foot DFU/wound; Dr Vivek Blanco             Uncontrolled type II diabetes mellitus ([facility] ICD-10-CM: E11.65  ICD-9-CM: 250.02  8/23/2019        Open wound of right thigh ICD-10-CM: S71.101A  ICD-9-CM: 890.0  9/16/2019        Open wound of right foot ICD-10-CM: S91.301A  ICD-9-CM: 892.0  9/9/2019        Sepsis ([facility] ICD-10-CM: A41.9  ICD-9-CM: 038.9, 995.91  8/24/2019        Cellulitis of right leg ICD-10-CM: L03.115  ICD-9-CM: 682.6  8/23/2019        Foreign body (FB) in soft tissue ICD-10-CM: M79.5  ICD-9-CM: 729.6  8/23/2019        Subcutaneous emphysema ([facility] ICD-10-CM: T79. 7XXA  ICD-9-CM: 958.7  8/23/2019        Personal history of noncompliance with medical treatment ICD-10-CM: Z91.19  ICD-9-CM: V15.81  8/23/2019        Hyponatremia ICD-10-CM: E87.1  ICD-9-CM: 276.1  8/23/2019            Principal Problem:    Diabetic ulcer of right foot associated with type 2 diabetes mellitus, with necrosis of muscle ([facility] (8/24/2019)      Overview: 9/11/19 s/p Autologous STSG and dermal autografts from right thigh to       right foot DFU/wound; Dr Kelle Shi    Active Problems:    URI (upper respiratory infection) (9/15/2019)      Medically noncompliant (9/9/2019)      Self neglect (9/11/2019)      Uncontrolled type II diabetes mellitus ([facility] [address] (8/23/2019)      Leukocytosis (9/9/2019)      Diabetic foot infection ([address] (9/9/2019)      Open wound of right foot (9/9/2019)      Open wound of right thigh (9/16/2019)         Diabetic right foot infection - limb threatening  He is s/p STSG and dermal autograft from right thigh to right foot on 9/11/19    He is also s/p right 5th toe amputation, abscess drainage, distal plantar forefoot debridement with extraction of glass (FB) on 8/25/19      No weight bearing right foot following STSG  Leave the thigh and foot dressing alone until Thursday  I changes them on Monday 9/16/19  I will see home next on Thursday 9/19/19 (POD8) and change his graft dressings again and removed the staples on the plantar aspect of his foot   He may go to SNF on Friday 9/20/19 if his medicaid comes through (application filed last admission)    OR cultures --->gm + cocci with alpha Strep    Recommend IV Abx for 7 more days until at least Thursday 9/19/19    Recommend SNF placement for 1-2 months    He failed outpt management and has demonstrated he won't be compliant with outpt Rx at home (diabetic, dressings, off-loading, etc...)  He lives alone without support in a home that the health department is threatening to condmenn in approx 25 days      PVD  No significant arterial stenosis noted on non-invasive arterial duplex on 8/26/19    Uncontrolled DM  Inpt diabetic control  Sliding scale and semiquants    Non-Compliance with outpt DM regimen  Encourage compliance  Risks of non-compliance discussed including amputation and early death  He needs SNF            I have personally performed a face-to-face diagnostic evaluation and management  service on this patient.       I have independently seen the patient. I have independently obtained the above history from the patient/family. I have independently examined the patient with above findings. I have independently reviewed data/labs for this patient and developed the above plan of care (MDM). Signed: Luc Quezada.  Mg Montgomery MD, FACS

## 2019-09-16 NOTE — PROGRESS NOTES
Patient assessment completed and charted. Dressing to right lower limb is clean, dry, and intact. Patient is able to pivot from the bed onto the bedside commode. Patient has a delay in answering questions and delayed in following directions. Otherwise, patient is calm, alert and oriented. Will continue to follow patient through hourly rounding. Will meet all patient's needs as requested and appropriate for patients needs.

## 2019-09-16 NOTE — PROGRESS NOTES
Interdisciplinary team rounds were held 9/16/2019 with the following team members:Care Management, Nursing, Nutrition, Pharmacy, Physical Therapy and Physician. Plan of care discussed. See clinical pathway and/or care plan for interventions and desired outcomes.

## 2019-09-16 NOTE — PROGRESS NOTES
Problem: Diabetes Self-Management  Goal: *Disease process and treatment process  Description  Define diabetes and identify own type of diabetes; list 3 options for treating diabetes. Outcome: Progressing Towards Goal  Goal: *Incorporating nutritional management into lifestyle  Description  Describe effect of type, amount and timing of food on blood glucose; list 3 methods for planning meals. Outcome: Progressing Towards Goal  Goal: *Incorporating physical activity into lifestyle  Description  State effect of exercise on blood glucose levels. Outcome: Progressing Towards Goal  Goal: *Developing strategies to promote health/change behavior  Description  Define the ABC's of diabetes; identify appropriate screenings, schedule and personal plan for screenings. Outcome: Progressing Towards Goal  Goal: *Using medications safely  Description  State effect of diabetes medications on diabetes; name diabetes medication taking, action and side effects. Outcome: Progressing Towards Goal  Goal: *Monitoring blood glucose, interpreting and using results  Description  Identify recommended blood glucose targets  and personal targets. Outcome: Progressing Towards Goal  Goal: *Prevention, detection, treatment of acute complications  Description  List symptoms of hyper- and hypoglycemia; describe how to treat low blood sugar and actions for lowering  high blood glucose level. Outcome: Progressing Towards Goal  Goal: *Prevention, detection and treatment of chronic complications  Description  Define the natural course of diabetes and describe the relationship of blood glucose levels to long term complications of diabetes.   Outcome: Progressing Towards Goal  Goal: *Developing strategies to address psychosocial issues  Description  Describe feelings about living with diabetes; identify support needed and support network  Outcome: Progressing Towards Goal  Goal: *Insulin pump training  Outcome: Progressing Towards Goal  Goal: *Sick day guidelines  Outcome: Progressing Towards Goal  Goal: *Patient Specific Goal (EDIT GOAL, INSERT TEXT)  Outcome: Progressing Towards Goal     Problem: Patient Education: Go to Patient Education Activity  Goal: Patient/Family Education  Outcome: Progressing Towards Goal     Problem: Falls - Risk of  Goal: *Absence of Falls  Description  Document Candida Lightning Fall Risk and appropriate interventions in the flowsheet. Outcome: Progressing Towards Goal  Note:   Fall Risk Interventions:  Mobility Interventions: Patient to call before getting OOB, PT Consult for mobility concerns         Medication Interventions: Teach patient to arise slowly    Problem: Patient Education: Go to Patient Education Activity  Goal: Patient/Family Education  Outcome: Progressing Towards Goal     Problem: Pressure Injury - Risk of  Goal: *Prevention of pressure injury  Description  Document Gui Scale and appropriate interventions in the flowsheet.   Outcome: Progressing Towards Goal  Note:   Pressure Injury Interventions:  Sensory Interventions: Pressure redistribution bed/mattress (bed type)    Moisture Interventions: Check for incontinence Q2 hours and as needed, Limit adult briefs    Activity Interventions: Increase time out of bed, Pressure redistribution bed/mattress(bed type), PT/OT evaluation    Mobility Interventions: Pressure redistribution bed/mattress (bed type), PT/OT evaluation    Nutrition Interventions: Document food/fluid/supplement intake    Friction and Shear Interventions: HOB 30 degrees or less, Lift sheet    Problem: Patient Education: Go to Patient Education Activity  Goal: Patient/Family Education  Outcome: Progressing Towards Goal     Problem: Nutrition Deficit  Goal: *Optimize nutritional status  Outcome: Progressing Towards Goal

## 2019-09-17 LAB
GLUCOSE BLD STRIP.AUTO-MCNC: 104 MG/DL (ref 65–100)
GLUCOSE BLD STRIP.AUTO-MCNC: 152 MG/DL (ref 65–100)
GLUCOSE BLD STRIP.AUTO-MCNC: 158 MG/DL (ref 65–100)
GLUCOSE BLD STRIP.AUTO-MCNC: 209 MG/DL (ref 65–100)

## 2019-09-17 PROCEDURE — 74011000258 HC RX REV CODE- 258: Performed by: SURGERY

## 2019-09-17 PROCEDURE — 82962 GLUCOSE BLOOD TEST: CPT

## 2019-09-17 PROCEDURE — 74011636637 HC RX REV CODE- 636/637: Performed by: SURGERY

## 2019-09-17 PROCEDURE — 74011250637 HC RX REV CODE- 250/637: Performed by: SURGERY

## 2019-09-17 PROCEDURE — 65270000029 HC RM PRIVATE

## 2019-09-17 PROCEDURE — 74011250636 HC RX REV CODE- 250/636: Performed by: SURGERY

## 2019-09-17 PROCEDURE — 74011250637 HC RX REV CODE- 250/637: Performed by: INTERNAL MEDICINE

## 2019-09-17 RX ADMIN — HEPARIN SODIUM 5000 UNITS: 5000 INJECTION INTRAVENOUS; SUBCUTANEOUS at 21:52

## 2019-09-17 RX ADMIN — AMLODIPINE BESYLATE 5 MG: 5 TABLET ORAL at 08:41

## 2019-09-17 RX ADMIN — INSULIN LISPRO 2 UNITS: 100 INJECTION, SOLUTION INTRAVENOUS; SUBCUTANEOUS at 21:51

## 2019-09-17 RX ADMIN — HEPARIN SODIUM 5000 UNITS: 5000 INJECTION INTRAVENOUS; SUBCUTANEOUS at 05:29

## 2019-09-17 RX ADMIN — HEPARIN SODIUM 5000 UNITS: 5000 INJECTION INTRAVENOUS; SUBCUTANEOUS at 14:00

## 2019-09-17 RX ADMIN — CEFTRIAXONE 1 G: 1 INJECTION, POWDER, FOR SOLUTION INTRAMUSCULAR; INTRAVENOUS at 23:46

## 2019-09-17 RX ADMIN — FLUTICASONE PROPIONATE 2 SPRAY: 50 SPRAY, METERED NASAL at 08:41

## 2019-09-17 RX ADMIN — METFORMIN HYDROCHLORIDE: 500 TABLET, FILM COATED ORAL at 08:41

## 2019-09-17 RX ADMIN — INSULIN LISPRO 4 UNITS: 100 INJECTION, SOLUTION INTRAVENOUS; SUBCUTANEOUS at 12:18

## 2019-09-17 RX ADMIN — CEFTRIAXONE 1 G: 1 INJECTION, POWDER, FOR SOLUTION INTRAMUSCULAR; INTRAVENOUS at 00:03

## 2019-09-17 RX ADMIN — LISINOPRIL 40 MG: 20 TABLET ORAL at 08:41

## 2019-09-17 NOTE — PROGRESS NOTES
Problem: Diabetes Self-Management  Goal: *Disease process and treatment process  Description  Define diabetes and identify own type of diabetes; list 3 options for treating diabetes. Outcome: Progressing Towards Goal  Goal: *Incorporating nutritional management into lifestyle  Description  Describe effect of type, amount and timing of food on blood glucose; list 3 methods for planning meals. Outcome: Progressing Towards Goal  Goal: *Incorporating physical activity into lifestyle  Description  State effect of exercise on blood glucose levels. Outcome: Progressing Towards Goal  Goal: *Developing strategies to promote health/change behavior  Description  Define the ABC's of diabetes; identify appropriate screenings, schedule and personal plan for screenings. Outcome: Progressing Towards Goal  Goal: *Using medications safely  Description  State effect of diabetes medications on diabetes; name diabetes medication taking, action and side effects. Outcome: Progressing Towards Goal  Goal: *Monitoring blood glucose, interpreting and using results  Description  Identify recommended blood glucose targets  and personal targets. Outcome: Progressing Towards Goal  Goal: *Prevention, detection, treatment of acute complications  Description  List symptoms of hyper- and hypoglycemia; describe how to treat low blood sugar and actions for lowering  high blood glucose level. Outcome: Progressing Towards Goal  Goal: *Prevention, detection and treatment of chronic complications  Description  Define the natural course of diabetes and describe the relationship of blood glucose levels to long term complications of diabetes.   Outcome: Progressing Towards Goal  Goal: *Developing strategies to address psychosocial issues  Description  Describe feelings about living with diabetes; identify support needed and support network  Outcome: Progressing Towards Goal  Goal: *Insulin pump training  Outcome: Progressing Towards Goal  Goal: *Sick day guidelines  Outcome: Progressing Towards Goal  Goal: *Patient Specific Goal (EDIT GOAL, INSERT TEXT)  Outcome: Progressing Towards Goal     Problem: Patient Education: Go to Patient Education Activity  Goal: Patient/Family Education  Outcome: Progressing Towards Goal     Problem: Falls - Risk of  Goal: *Absence of Falls  Description  Document [facility] Fall Risk and appropriate interventions in the flowsheet. Outcome: Progressing Towards Goal  Note:   Fall Risk Interventions:  Mobility Interventions: Patient to call before getting OOB, Utilize walker, cane, or other assistive device         Medication Interventions: Teach patient to arise slowly                   Problem: Patient Education: Go to Patient Education Activity  Goal: Patient/Family Education  Outcome: Progressing Towards Goal     Problem: Pressure Injury - Risk of  Goal: *Prevention of pressure injury  Description  Document Gui Scale and appropriate interventions in the flowsheet.   Outcome: Progressing Towards Goal  Note:   Pressure Injury Interventions:  Sensory Interventions: Discuss PT/OT consult with provider, Pressure redistribution bed/mattress (bed type)    Moisture Interventions: Absorbent underpads, Check for incontinence Q2 hours and as needed    Activity Interventions: Increase time out of bed, PT/OT evaluation    Mobility Interventions: Assess need for specialty bed    Nutrition Interventions: Document food/fluid/supplement intake    Friction and Shear Interventions: HOB 30 degrees or less      Problem: Patient Education: Go to Patient Education Activity  Goal: Patient/Family Education  Outcome: Progressing Towards Goal     Problem: Nutrition Deficit  Goal: *Optimize nutritional status  Outcome: Progressing Towards Goal

## 2019-09-17 NOTE — PROGRESS NOTES
Patient assessment completed and charted. Patient is alert and oriented although he is slow to answer questions. He gets up with ease and pivots to use the bedside commode. Dressing to his right foot is clean, dry, and intact. Patient heart sounds are regular. S1 & S2 auscultated and a murmur is heard. Patient is voiding adequate amounts of urine and is having daily bowel movements.

## 2019-09-17 NOTE — PROGRESS NOTES
Interdisciplinary team rounds were held 9/17/2019 with the following team members:Care Management, Nursing, Nutrition, Pharmacy, Physical Therapy and Physician. Plan of care discussed. See clinical pathway and/or care plan for interventions and desired outcomes.

## 2019-09-17 NOTE — PROGRESS NOTES
Care Management Interventions  Transition of Care Consult (CM Consult): Discharge Planning  Current Support Network: Lives Alone  Discharge Location  Discharge Placement: Skilled nursing facility       SW did not hear back from Terrence with [facility] as to which facility will have a bed for pt. Terrence agrees to fax contract to Deng Long for her approval.              SW had long conversation with pt's sister Tammy Aguilar [phone] ) who confirms will be at the hospital on [address]. SW explained the d/c plan for SNF on Friday that hospital will cover cost of 30 days at SNF with possible extension of another 30 days. SW explained need for her to be present on 10-1-19 when SS office calls pt. When SW provided her with # that SS will be calling, she states she is unaware of that #, she states she calls pt on [phone]. Since pt's sister agrees to be present on 10-1-19 during phone interview the SS office will be provided that number to call. CUAUHTEMOC spoke with Shawanda Huff with [facility] who agrees to send updated phone # to [facility] office. Collette Lan further states if SS office were to change appt date or time she will contact pt's sister herself. CUAUHTEMOC spoke with Micah Phillips at [place] [address] ( [phone] ) to provide d/c plan. He agrees to call SW on Thurs to find out name of facility. Clarification to Josh Mendez note. On 10-1-2019 at 9:15 ( actually between 9:15 and 10:15 ) the Social Security office will call pt to conduct the disability phone interview. This is the first process in pt applying for disability benefits. CUAUHTEMOC received this info from [address] rep [facility] office will call pt on [phone] ). SW received call from Deng Long stating that hospital will cover cost of pt discharging to a SNF. CUAUHTEMOC spoke with pt explained d/c plan is for SNF likely on Friday. Pt is agreeable. Pt states his sister Tammy Aguilar  [phone] ) will be here on Thurs.   CUAUHTEMOC asked pt to please have his sister see CUAUHTEMOC while she is here. SW spoke with Michelle Goldberg with [facility] and sent referral on CC link to [facility] ) but Michelle Goldberg understands pt can admit to any facility with a male bed.

## 2019-09-17 NOTE — PROGRESS NOTES
Patient sitting up to eat lunch. He has no complaints of pain or discomfort at this time. Will continue to follow.

## 2019-09-17 NOTE — DIABETES MGMT
. Blood glucose range yesterday 104-155 with pt receiving Humalog 2 units of sliding scale coverage, and Glucovance 2.5/500 mg. Educated pt regarding current diabetes medication regimen: Humalog sliding scale coverage 4x/day ac and hs, and Glucovance 2.5/500 mg. Will need reinforcement and review. Creatinine 0.92. GFR:>60. Pt states that he has practiced insulin self- injection under RN supervision, but would prefer pills at discharge. Plan per case management note is for pt to discharge to SNF for 30-60 days. Stressed the importance of follow up care for diabetes management with PCP. Explained the importance of monitoring blood glucose and recording in provided blood glucose log book to bring to PCP appointments to assist with medication titration. Discussed target goals for blood glucose and HbA1c and the relationship between hyperglycemia and infection. Discussed target goals for blood glucose and HbA1c. Pt verbalizes understanding and has no further questions at this time.

## 2019-09-17 NOTE — PROGRESS NOTES
Hospitalist Note     Admit Date:  2019  7:48 PM   Name:  Mayela Velasquez   Age:  49 y.o.  :  1965   MRN:  [id number]   PCP:  Mary Alice Aguilar MD  Treatment Team: Attending Provider: Diego Choi DO; Consulting Provider: Rufino Stein MD; Utilization Review: Marcela Mccann RN; Care Manager: Ranjit Arteaga LMSW; : JASON Farah    HPI/Subjective:     Mr. Nora Dawkins is a 48 yo male with PMH of DM2, left foot wound followed by Dr. Jon Carlson of surgery, who has been readmitted with noncompliance, worsening foot wound and poor living conditions. He has been resumed on IV rocephin, EOT 19 and seen by surgery/ getting wound care. he is pending rehab.       19 not much pain, eating ok, no dyspnea or cough, no nausea or BM issues           Objective:     Patient Vitals for the past 24 hrs:   Temp Pulse Resp BP SpO2   19 1225 97.5 °F (36.4 °C) 69 18 132/67 96 %   19 0727 97.6 °F (36.4 °C) (!) 56 20 118/78 94 %   19 0435 97.9 °F (36.6 °C) 72 18 135/68 94 %   19 2352 97.8 °F (36.6 °C) 69 18 131/74 96 %   19 1930 97.8 °F (36.6 °C) 67 18 138/75 98 %   19 1724 98.7 °F (37.1 °C) 69 19 148/72 91 %     Oxygen Therapy  O2 Sat (%): 96 % (19 1225)  O2 Device: Room air (19 0844)    Estimated body mass index is 31.01 kg/m² as calculated from the following:    Height as of this encounter: 5' 9\" (1.753 m). Weight as of this encounter: 95.3 kg (210 lb). Intake/Output Summary (Last 24 hours) at 2019 1607  Last data filed at 2019 0848  Gross per 24 hour   Intake --   Output 600 ml   Net -600 ml       *Note that automatically entered I/Os may not be accurate; dependent on patient compliance with collection and accurate  by techs. General:    Well nourished. Alert. No distress     CV:   RRR. No murmur, rub, or gallop. No edema  Lungs:   CTAB. No wheezing, rhonchi, or rales.  Anterior   Abdomen:   Soft, nontender, nondistended. Decreased BS  Extremities: Warm and dry  Skin:     LLE bandaged  Neuro:  No gross focal deficits    Data Review:  I have reviewed all labs, meds, and studies from the last 24 hours:    Recent Results (from the past 24 hour(s))   GLUCOSE, POC    Collection Time: 09/16/19  4:17 PM   Result Value Ref Range    Glucose (POC) 104 (H) 65 - 100 mg/dL   GLUCOSE, POC    Collection Time: 09/16/19  9:22 PM   Result Value Ref Range    Glucose (POC) 137 (H) 65 - 100 mg/dL   GLUCOSE, POC    Collection Time: 09/17/19  7:29 AM   Result Value Ref Range    Glucose (POC) 152 (H) 65 - 100 mg/dL   GLUCOSE, POC    Collection Time: 09/17/19 11:44 AM   Result Value Ref Range    Glucose (POC) 209 (H) 65 - 100 mg/dL        All Micro Results     None          No results found for this visit on 09/09/19.     Current Meds:  Current Facility-Administered Medications   Medication Dose Route Frequency    [START ON 9/18/2019] glyBURIDE/metFORMIN (GLUCOVANCE) 2.5/500 mg   Oral DAILY WITH BREAKFAST    sodium chloride (OCEAN) 0.65 % nasal squeeze bottle 2 Spray  2 Spray Both Nostrils PRN    lisinopril (PRINIVIL, ZESTRIL) tablet 40 mg  40 mg Oral DAILY    amLODIPine (NORVASC) tablet 5 mg  5 mg Oral DAILY    fluticasone propionate (FLONASE) 50 mcg/actuation nasal spray 2 Spray  2 Spray Both Nostrils DAILY    hydrALAZINE (APRESOLINE) 20 mg/mL injection 10 mg  10 mg IntraVENous Q6H PRN    dextrose 40% (GLUTOSE) oral gel 1 Tube  15 g Oral PRN    glucagon (GLUCAGEN) injection 1 mg  1 mg IntraMUSCular PRN    dextrose (D50W) injection syrg 12.5-25 g  25-50 mL IntraVENous PRN    insulin lispro (HUMALOG) injection   SubCUTAneous AC&HS    acetaminophen (TYLENOL) tablet 650 mg  650 mg Oral Q4H PRN    ondansetron (ZOFRAN) injection 4 mg  4 mg IntraVENous Q4H PRN    heparin (porcine) injection 5,000 Units  5,000 Units SubCUTAneous Q8H    bisacodyl (DULCOLAX) tablet 5 mg  5 mg Oral DAILY PRN    diphenhydrAMINE (BENADRYL) capsule 25 mg  25 mg Oral Q6H PRN    cefTRIAXone (ROCEPHIN) 1 g in 0.9% sodium chloride (MBP/ADV) 50 mL  1 g IntraVENous Q24H       Other Studies (last 24 hours):  No results found.     Assessment and Plan:     Hospital Problems as of 9/17/2019 Date Reviewed: 9/9/2019          Codes Class Noted - Resolved POA    Open wound of right thigh ICD-10-CM: S71.101A  ICD-9-CM: 890.0  9/16/2019 - Present No        URI (upper respiratory infection) ICD-10-CM: J06.9  ICD-9-CM: 465.9  9/15/2019 - Present No        Self neglect ICD-10-CM: R46.89  ICD-9-CM: V40.39  9/11/2019 - Present Yes        Leukocytosis ICD-10-CM: D72.829  ICD-9-CM: 288.60  9/9/2019 - Present Yes        Medically noncompliant ICD-10-CM: Z91.19  ICD-9-CM: V15.81  9/9/2019 - Present Yes        Open wound of right foot ICD-10-CM: S91.301A  ICD-9-CM: 892.0  9/9/2019 - Present Yes        Diabetic foot infection ([facility] ICD-10-CM: E11.628, L08.9  ICD-9-CM: 250.80, 686.9  9/9/2019 - Present Yes        * (Principal) Diabetic ulcer of right foot associated with type 2 diabetes mellitus, with necrosis of muscle ([facility] ICD-10-CM: E11.621, L97.513  ICD-9-CM: 250.80, 707.15  8/24/2019 - Present Yes    Overview Addendum 9/16/2019  7:09 AM by Sugar Pérez MD     9/11/19 s/p Autologous STSG and dermal autografts from right thigh to right foot DFU/wound; Dr Sean Andrew             Uncontrolled type II diabetes mellitus ([facility] ICD-10-CM: E11.65  ICD-9-CM: 250.02  8/23/2019 - Present Yes              Plan:    · LLE DM2 foot wound: wound care per surgery, continued antibiotics EOT 9-17-19,  · DM2: refusing insulin per Elke Flores of Dm2 education, continued glucovance/ SSI  · HTN: continued norvasc/ lisinopril    DC planning/Dispo:  pending      Diet:  DIET DIABETIC CONSISTENT CARB  DIET NUTRITIONAL SUPPLEMENTS  DVT ppx:  heparin    Signed:  Jatinder Monroe MD

## 2019-09-17 NOTE — PROGRESS NOTES
Problem: Diabetes Self-Management  Goal: *Disease process and treatment process  Description  Define diabetes and identify own type of diabetes; list 3 options for treating diabetes. Outcome: Progressing Towards Goal  Goal: *Incorporating nutritional management into lifestyle  Description  Describe effect of type, amount and timing of food on blood glucose; list 3 methods for planning meals. Outcome: Progressing Towards Goal  Goal: *Incorporating physical activity into lifestyle  Description  State effect of exercise on blood glucose levels. Outcome: Progressing Towards Goal  Goal: *Developing strategies to promote health/change behavior  Description  Define the ABC's of diabetes; identify appropriate screenings, schedule and personal plan for screenings. Outcome: Progressing Towards Goal  Goal: *Using medications safely  Description  State effect of diabetes medications on diabetes; name diabetes medication taking, action and side effects. Outcome: Progressing Towards Goal  Goal: *Monitoring blood glucose, interpreting and using results  Description  Identify recommended blood glucose targets  and personal targets. Outcome: Progressing Towards Goal  Goal: *Prevention, detection, treatment of acute complications  Description  List symptoms of hyper- and hypoglycemia; describe how to treat low blood sugar and actions for lowering  high blood glucose level. Outcome: Progressing Towards Goal  Goal: *Prevention, detection and treatment of chronic complications  Description  Define the natural course of diabetes and describe the relationship of blood glucose levels to long term complications of diabetes.   Outcome: Progressing Towards Goal  Goal: *Developing strategies to address psychosocial issues  Description  Describe feelings about living with diabetes; identify support needed and support network  Outcome: Progressing Towards Goal  Goal: *Insulin pump training  Outcome: Progressing Towards Goal  Goal: *Sick day guidelines  Outcome: Progressing Towards Goal  Goal: *Patient Specific Goal (EDIT GOAL, INSERT TEXT)  Outcome: Progressing Towards Goal     Problem: Patient Education: Go to Patient Education Activity  Goal: Patient/Family Education  Outcome: Progressing Towards Goal     Problem: Falls - Risk of  Goal: *Absence of Falls  Description  Document Stuart Tan Fall Risk and appropriate interventions in the flowsheet. Outcome: Progressing Towards Goal  Note:   Fall Risk Interventions:  Mobility Interventions: Patient to call before getting OOB, Utilize walker, cane, or other assistive device         Medication Interventions: Teach patient to arise slowly                   Problem: Patient Education: Go to Patient Education Activity  Goal: Patient/Family Education  Outcome: Progressing Towards Goal     Problem: Pressure Injury - Risk of  Goal: *Prevention of pressure injury  Description  Document Gui Scale and appropriate interventions in the flowsheet.   Outcome: Progressing Towards Goal  Note:   Pressure Injury Interventions:  Sensory Interventions: Discuss PT/OT consult with provider, Pressure redistribution bed/mattress (bed type)    Moisture Interventions: Absorbent underpads, Check for incontinence Q2 hours and as needed    Activity Interventions: Increase time out of bed, PT/OT evaluation    Mobility Interventions: Assess need for specialty bed    Nutrition Interventions: Document food/fluid/supplement intake    Friction and Shear Interventions: HOB 30 degrees or less                Problem: Patient Education: Go to Patient Education Activity  Goal: Patient/Family Education  Outcome: Progressing Towards Goal     Problem: Nutrition Deficit  Goal: *Optimize nutritional status  Outcome: Progressing Towards Goal

## 2019-09-18 LAB
ANION GAP SERPL CALC-SCNC: 6 MMOL/L (ref 7–16)
BUN SERPL-MCNC: 22 MG/DL (ref 6–23)
CALCIUM SERPL-MCNC: 9.3 MG/DL (ref 8.3–10.4)
CHLORIDE SERPL-SCNC: 100 MMOL/L (ref 98–107)
CO2 SERPL-SCNC: 29 MMOL/L (ref 21–32)
CREAT SERPL-MCNC: 0.95 MG/DL (ref 0.8–1.5)
ERYTHROCYTE [DISTWIDTH] IN BLOOD BY AUTOMATED COUNT: 13.3 % (ref 11.9–14.6)
GLUCOSE BLD STRIP.AUTO-MCNC: 135 MG/DL (ref 65–100)
GLUCOSE BLD STRIP.AUTO-MCNC: 140 MG/DL (ref 65–100)
GLUCOSE BLD STRIP.AUTO-MCNC: 188 MG/DL (ref 65–100)
GLUCOSE BLD STRIP.AUTO-MCNC: 91 MG/DL (ref 65–100)
GLUCOSE SERPL-MCNC: 132 MG/DL (ref 65–100)
HCT VFR BLD AUTO: 34.8 % (ref 41.1–50.3)
HGB BLD-MCNC: 11.2 G/DL (ref 13.6–17.2)
MCH RBC QN AUTO: 28 PG (ref 26.1–32.9)
MCHC RBC AUTO-ENTMCNC: 32.2 G/DL (ref 31.4–35)
MCV RBC AUTO: 87 FL (ref 79.6–97.8)
NRBC # BLD: 0 K/UL (ref 0–0.2)
PLATELET # BLD AUTO: 192 K/UL (ref 150–450)
PMV BLD AUTO: 10.5 FL (ref 9.4–12.3)
POTASSIUM SERPL-SCNC: 4.1 MMOL/L (ref 3.5–5.1)
RBC # BLD AUTO: 4 M/UL (ref 4.23–5.6)
SODIUM SERPL-SCNC: 135 MMOL/L (ref 136–145)
WBC # BLD AUTO: 7.3 K/UL (ref 4.3–11.1)

## 2019-09-18 PROCEDURE — 82962 GLUCOSE BLOOD TEST: CPT

## 2019-09-18 PROCEDURE — 36415 COLL VENOUS BLD VENIPUNCTURE: CPT

## 2019-09-18 PROCEDURE — 74011636637 HC RX REV CODE- 636/637: Performed by: SURGERY

## 2019-09-18 PROCEDURE — 74011250637 HC RX REV CODE- 250/637: Performed by: INTERNAL MEDICINE

## 2019-09-18 PROCEDURE — 74011250636 HC RX REV CODE- 250/636: Performed by: SURGERY

## 2019-09-18 PROCEDURE — 65270000029 HC RM PRIVATE

## 2019-09-18 PROCEDURE — 80048 BASIC METABOLIC PNL TOTAL CA: CPT

## 2019-09-18 PROCEDURE — 77030008031

## 2019-09-18 PROCEDURE — 85027 COMPLETE CBC AUTOMATED: CPT

## 2019-09-18 RX ADMIN — METFORMIN HYDROCHLORIDE: 500 TABLET, FILM COATED ORAL at 10:41

## 2019-09-18 RX ADMIN — HEPARIN SODIUM 5000 UNITS: 5000 INJECTION INTRAVENOUS; SUBCUTANEOUS at 06:40

## 2019-09-18 RX ADMIN — AMLODIPINE BESYLATE 5 MG: 5 TABLET ORAL at 10:41

## 2019-09-18 RX ADMIN — HEPARIN SODIUM 5000 UNITS: 5000 INJECTION INTRAVENOUS; SUBCUTANEOUS at 13:29

## 2019-09-18 RX ADMIN — FLUTICASONE PROPIONATE 2 SPRAY: 50 SPRAY, METERED NASAL at 10:42

## 2019-09-18 RX ADMIN — LISINOPRIL 40 MG: 20 TABLET ORAL at 10:41

## 2019-09-18 RX ADMIN — HEPARIN SODIUM 5000 UNITS: 5000 INJECTION INTRAVENOUS; SUBCUTANEOUS at 22:00

## 2019-09-18 RX ADMIN — INSULIN LISPRO 2 UNITS: 100 INJECTION, SOLUTION INTRAVENOUS; SUBCUTANEOUS at 13:29

## 2019-09-18 NOTE — PROGRESS NOTES
Hospitalist Note     Admit Date:  2019  7:48 PM   Name:  Kelly Hamilton   Age:  49 y.o.  :  1965   MRN:  [id number]   PCP:  Kota Alvarez MD  Treatment Team: Attending Provider: Anni Allen DO; Consulting Provider: Roman Menard MD; Utilization Review: Teofilo Liao RN; Care Manager: Deana Phillips LMSW; : JASON Gee    HPI/Subjective:       Mr. Hannah Barreto is a 48 yo male with PMH of DM2, left foot wound followed by Dr. Stevie Cogan of surgery, who has been readmitted with noncompliance, worsening foot wound and poor living conditions. He has been resumed on IV rocephin, EOT 19 and seen by surgery/ getting wound care. he is pending rehab.        no pain, ate ok, had BM, no edema           Objective:     Patient Vitals for the past 24 hrs:   Temp Pulse Resp BP SpO2   19 0825 98 °F (36.7 °C) 69 18 115/77 97 %   19 0421 97.5 °F (36.4 °C) 63 18 128/64 96 %   19 2347 97.6 °F (36.4 °C) 67 16 125/71 95 %   19 2000 98.1 °F (36.7 °C) 72 18 137/73 97 %   19 1542 98.4 °F (36.9 °C) 69 16 132/72 96 %     Oxygen Therapy  O2 Sat (%): 97 % (19 0825)  O2 Device: Room air (19 195)    Estimated body mass index is 31.01 kg/m² as calculated from the following:    Height as of this encounter: 5' 9\" (1.753 m). Weight as of this encounter: 95.3 kg (210 lb). Intake/Output Summary (Last 24 hours) at 2019 1428  Last data filed at 2019 0933  Gross per 24 hour   Intake --   Output 1910 ml   Net -1910 ml       *Note that automatically entered I/Os may not be accurate; dependent on patient compliance with collection and accurate  by techs. General:    Well nourished. Alert. No distress     CV:   RRR. No murmur, rub, or gallop. No edema  Lungs:   CTAB. No wheezing, rhonchi, or rales. Anterior   Abdomen:   Soft, nontender, nondistended. Decreased BS  Extremities: Warm and dry  Skin:     LLE bandaged  Neuro:   No gross focal deficits    Data Review:  I have reviewed all labs, meds, and studies from the last 24 hours:    Recent Results (from the past 24 hour(s))   GLUCOSE, POC    Collection Time: 09/17/19  4:56 PM   Result Value Ref Range    Glucose (POC) 104 (H) 65 - 100 mg/dL   GLUCOSE, POC    Collection Time: 09/17/19  9:17 PM   Result Value Ref Range    Glucose (POC) 158 (H) 65 - 100 mg/dL   CBC W/O DIFF    Collection Time: 09/18/19  4:48 AM   Result Value Ref Range    WBC 7.3 4.3 - 11.1 K/uL    RBC 4.00 (L) 4.23 - 5.6 M/uL    HGB 11.2 (L) 13.6 - 17.2 g/dL    HCT 34.8 (L) 41.1 - 50.3 %    MCV 87.0 79.6 - 97.8 FL    MCH 28.0 26.1 - 32.9 PG    MCHC 32.2 31.4 - 35.0 g/dL    RDW 13.3 11.9 - 14.6 %    PLATELET 777 672 - 980 K/uL    MPV 10.5 9.4 - 12.3 FL    ABSOLUTE NRBC 0.00 0.0 - 0.2 K/uL   METABOLIC PANEL, BASIC    Collection Time: 09/18/19  4:48 AM   Result Value Ref Range    Sodium 135 (L) 136 - 145 mmol/L    Potassium 4.1 3.5 - 5.1 mmol/L    Chloride 100 98 - 107 mmol/L    CO2 29 21 - 32 mmol/L    Anion gap 6 (L) 7 - 16 mmol/L    Glucose 132 (H) 65 - 100 mg/dL    BUN 22 6 - 23 MG/DL    Creatinine 0.95 0.8 - 1.5 MG/DL    GFR est AA >60 >60 ml/min/1.73m2    GFR est non-AA >60 >60 ml/min/1.73m2    Calcium 9.3 8.3 - 10.4 MG/DL   GLUCOSE, POC    Collection Time: 09/18/19  7:19 AM   Result Value Ref Range    Glucose (POC) 140 (H) 65 - 100 mg/dL   GLUCOSE, POC    Collection Time: 09/18/19 12:09 PM   Result Value Ref Range    Glucose (POC) 188 (H) 65 - 100 mg/dL        All Micro Results     None          No results found for this visit on 09/09/19.     Current Meds:  Current Facility-Administered Medications   Medication Dose Route Frequency    glyBURIDE/metFORMIN (GLUCOVANCE) 2.5/500 mg   Oral DAILY WITH BREAKFAST    sodium chloride (OCEAN) 0.65 % nasal squeeze bottle 2 Spray  2 Spray Both Nostrils PRN    lisinopril (PRINIVIL, ZESTRIL) tablet 40 mg  40 mg Oral DAILY    amLODIPine (NORVASC) tablet 5 mg  5 mg Oral DAILY    fluticasone propionate (FLONASE) 50 mcg/actuation nasal spray 2 Spray  2 Spray Both Nostrils DAILY    hydrALAZINE (APRESOLINE) 20 mg/mL injection 10 mg  10 mg IntraVENous Q6H PRN    dextrose 40% (GLUTOSE) oral gel 1 Tube  15 g Oral PRN    glucagon (GLUCAGEN) injection 1 mg  1 mg IntraMUSCular PRN    dextrose (D50W) injection syrg 12.5-25 g  25-50 mL IntraVENous PRN    insulin lispro (HUMALOG) injection   SubCUTAneous AC&HS    acetaminophen (TYLENOL) tablet 650 mg  650 mg Oral Q4H PRN    ondansetron (ZOFRAN) injection 4 mg  4 mg IntraVENous Q4H PRN    heparin (porcine) injection 5,000 Units  5,000 Units SubCUTAneous Q8H    bisacodyl (DULCOLAX) tablet 5 mg  5 mg Oral DAILY PRN    diphenhydrAMINE (BENADRYL) capsule 25 mg  25 mg Oral Q6H PRN    cefTRIAXone (ROCEPHIN) 1 g in 0.9% sodium chloride (MBP/ADV) 50 mL  1 g IntraVENous Q24H       Other Studies (last 24 hours):  No results found.     Assessment and Plan:     Hospital Problems as of 9/18/2019 Date Reviewed: 9/9/2019          Codes Class Noted - Resolved POA    Open wound of right thigh ICD-10-CM: S71.101A  ICD-9-CM: 890.0  9/16/2019 - Present No        URI (upper respiratory infection) ICD-10-CM: J06.9  ICD-9-CM: 465.9  9/15/2019 - Present No        Self neglect ICD-10-CM: R46.89  ICD-9-CM: V40.39  9/11/2019 - Present Yes        Leukocytosis ICD-10-CM: D72.829  ICD-9-CM: 288.60  9/9/2019 - Present Yes        Medically noncompliant ICD-10-CM: Z91.19  ICD-9-CM: V15.81  9/9/2019 - Present Yes        Open wound of right foot ICD-10-CM: S91.301A  ICD-9-CM: 892.0  9/9/2019 - Present Yes        Diabetic foot infection ([facility] ICD-10-CM: E11.628, L08.9  ICD-9-CM: 250.80, 686.9  9/9/2019 - Present Yes        * (Principal) Diabetic ulcer of right foot associated with type 2 diabetes mellitus, with necrosis of muscle ([facility] ICD-10-CM: E11.621, L97.513  ICD-9-CM: 250.80, 707.15  8/24/2019 - Present Yes    Overview Addendum 9/16/2019  7:09 AM by Lamberto Tuttle MD 9/11/19 s/p Autologous STSG and dermal autografts from right thigh to right foot DFU/wound; Dr Sean Andrew             Uncontrolled type II diabetes mellitus [facility]) ICD-10-CM: E11.65  ICD-9-CM: 250.02  8/23/2019 - Present Yes              Plan:    · LLE DM2 foot wound: wound care per surgery, continued antibiotics EOT 9-19-19,  · DM2: refusing insulin per Elke Flores of Dm2 education, continued glucovance/ SSI  · HTN: continued norvasc/ lisinopril    DC planning/Dispo:  Pending to rehab      Diet:  DIET DIABETIC CONSISTENT CARB  DIET NUTRITIONAL SUPPLEMENTS  DVT ppx:  heparin    Signed:  Jatinder Monroe MD

## 2019-09-18 NOTE — PROGRESS NOTES
Bedside shift report received from [facility], [address] [place] [address]. No needs voiced at this time. Safety measures in place, call light within reach. Will continue to monitor.

## 2019-09-18 NOTE — PROGRESS NOTES
Interdisciplinary team rounds were held 9/18/2019 with the following team members:Care Management, Nursing, Nutrition, Pharmacy, Physical Therapy and Physician. Plan of care discussed. See clinical pathway and/or care plan for interventions and desired outcomes.

## 2019-09-18 NOTE — PROGRESS NOTES
Shift assessment complete. Pt alert and oriented x4. Respirations even and unlabored. Lung sounds clear on room air. HR regular. Abdomen soft with active bowel sounds heard in all four quadrants. Surgical wound to right thigh and right foot with dressing intact. No edema noted. RLE remains elevated on a pillow. IV patent and capped. Pt denies pain and nausea. All needs met at this time. Safety measures in place, call light within reach. Will continue to monitor.

## 2019-09-18 NOTE — PROGRESS NOTES
Problem: Diabetes Self-Management  Goal: *Disease process and treatment process  Description  Define diabetes and identify own type of diabetes; list 3 options for treating diabetes. Outcome: Progressing Towards Goal  Goal: *Incorporating nutritional management into lifestyle  Description  Describe effect of type, amount and timing of food on blood glucose; list 3 methods for planning meals. Outcome: Progressing Towards Goal  Goal: *Incorporating physical activity into lifestyle  Description  State effect of exercise on blood glucose levels. Outcome: Progressing Towards Goal  Goal: *Developing strategies to promote health/change behavior  Description  Define the ABC's of diabetes; identify appropriate screenings, schedule and personal plan for screenings. Outcome: Progressing Towards Goal  Goal: *Using medications safely  Description  State effect of diabetes medications on diabetes; name diabetes medication taking, action and side effects. Outcome: Progressing Towards Goal  Goal: *Monitoring blood glucose, interpreting and using results  Description  Identify recommended blood glucose targets  and personal targets. Outcome: Progressing Towards Goal  Goal: *Prevention, detection, treatment of acute complications  Description  List symptoms of hyper- and hypoglycemia; describe how to treat low blood sugar and actions for lowering  high blood glucose level. Outcome: Progressing Towards Goal  Goal: *Prevention, detection and treatment of chronic complications  Description  Define the natural course of diabetes and describe the relationship of blood glucose levels to long term complications of diabetes.   Outcome: Progressing Towards Goal  Goal: *Developing strategies to address psychosocial issues  Description  Describe feelings about living with diabetes; identify support needed and support network  Outcome: Progressing Towards Goal  Goal: *Insulin pump training  Outcome: Progressing Towards Goal     Problem: Patient Education: Go to Patient Education Activity  Goal: Patient/Family Education  Outcome: Progressing Towards Goal     Problem: Falls - Risk of  Goal: *Absence of Falls  Description  Document Franklin Hart Fall Risk and appropriate interventions in the flowsheet. Outcome: Progressing Towards Goal  Note:   Fall Risk Interventions:  Mobility Interventions: Patient to call before getting OOB, Utilize walker, cane, or other assistive device         Medication Interventions: Teach patient to arise slowly                   Problem: Patient Education: Go to Patient Education Activity  Goal: Patient/Family Education  Outcome: Progressing Towards Goal     Problem: Pressure Injury - Risk of  Goal: *Prevention of pressure injury  Description  Document Gui Scale and appropriate interventions in the flowsheet.   Outcome: Progressing Towards Goal  Note:   Pressure Injury Interventions:  Sensory Interventions: Discuss PT/OT consult with provider, Pressure redistribution bed/mattress (bed type)    Moisture Interventions: Absorbent underpads, Check for incontinence Q2 hours and as needed    Activity Interventions: Increase time out of bed, PT/OT evaluation    Mobility Interventions: Assess need for specialty bed    Nutrition Interventions: Document food/fluid/supplement intake    Friction and Shear Interventions: HOB 30 degrees or less                Problem: Patient Education: Go to Patient Education Activity  Goal: Patient/Family Education  Outcome: Progressing Towards Goal     Problem: Nutrition Deficit  Goal: *Optimize nutritional status  Outcome: Progressing Towards Goal

## 2019-09-19 LAB
GLUCOSE BLD STRIP.AUTO-MCNC: 118 MG/DL (ref 65–100)
GLUCOSE BLD STRIP.AUTO-MCNC: 151 MG/DL (ref 65–100)
GLUCOSE BLD STRIP.AUTO-MCNC: 160 MG/DL (ref 65–100)
GLUCOSE BLD STRIP.AUTO-MCNC: 187 MG/DL (ref 65–100)

## 2019-09-19 PROCEDURE — 77030020255 HC SOL INJ LR 1000ML BG

## 2019-09-19 PROCEDURE — 74011250636 HC RX REV CODE- 250/636: Performed by: SURGERY

## 2019-09-19 PROCEDURE — 74011250636 HC RX REV CODE- 250/636: Performed by: HOSPITALIST

## 2019-09-19 PROCEDURE — 74011250637 HC RX REV CODE- 250/637: Performed by: INTERNAL MEDICINE

## 2019-09-19 PROCEDURE — 82962 GLUCOSE BLOOD TEST: CPT

## 2019-09-19 PROCEDURE — 74011636637 HC RX REV CODE- 636/637: Performed by: SURGERY

## 2019-09-19 PROCEDURE — 65270000029 HC RM PRIVATE

## 2019-09-19 PROCEDURE — 74011000258 HC RX REV CODE- 258: Performed by: HOSPITALIST

## 2019-09-19 RX ADMIN — AMLODIPINE BESYLATE 5 MG: 5 TABLET ORAL at 08:25

## 2019-09-19 RX ADMIN — INSULIN LISPRO 2 UNITS: 100 INJECTION, SOLUTION INTRAVENOUS; SUBCUTANEOUS at 12:42

## 2019-09-19 RX ADMIN — HEPARIN SODIUM 5000 UNITS: 5000 INJECTION INTRAVENOUS; SUBCUTANEOUS at 22:15

## 2019-09-19 RX ADMIN — INSULIN LISPRO 2 UNITS: 100 INJECTION, SOLUTION INTRAVENOUS; SUBCUTANEOUS at 22:48

## 2019-09-19 RX ADMIN — FLUTICASONE PROPIONATE 2 SPRAY: 50 SPRAY, METERED NASAL at 08:26

## 2019-09-19 RX ADMIN — INSULIN LISPRO 2 UNITS: 100 INJECTION, SOLUTION INTRAVENOUS; SUBCUTANEOUS at 08:24

## 2019-09-19 RX ADMIN — METFORMIN HYDROCHLORIDE: 500 TABLET, FILM COATED ORAL at 08:24

## 2019-09-19 RX ADMIN — LISINOPRIL 40 MG: 20 TABLET ORAL at 08:25

## 2019-09-19 RX ADMIN — HEPARIN SODIUM 5000 UNITS: 5000 INJECTION INTRAVENOUS; SUBCUTANEOUS at 06:20

## 2019-09-19 RX ADMIN — HEPARIN SODIUM 5000 UNITS: 5000 INJECTION INTRAVENOUS; SUBCUTANEOUS at 14:09

## 2019-09-19 RX ADMIN — CEFTRIAXONE 1 G: 1 INJECTION, POWDER, FOR SOLUTION INTRAMUSCULAR; INTRAVENOUS at 00:02

## 2019-09-19 NOTE — DISCHARGE INSTRUCTIONS
Leave right thigh and right foot dressing alone until follow-up  Keep them dry  Minimal right foot weight bearing (approx 10min q day total)   and only while using Demetrius off-loading show to off-load the plantar forefoot at base of remaining toes where there is a new skin graft    Try to keep incisions as dry as possible to lower risk of infection.     Follow-up with Dr Carmine Mckeon in approx 6 days on Thursday Sept 26 in the office at:  [address], [address]  (Call for an appt time-->[phone]-->option 1)

## 2019-09-19 NOTE — PROGRESS NOTES
Hospitalist Note     Admit Date:  2019  7:48 PM   Name:  Kaiden Blankenship   Age:  49 y.o.  :  1965   MRN:  [id number]   PCP:  Ileana Fletcher MD  Treatment Team: Attending Provider: Alondra Benoit DO; Utilization Review: Kaitlynn Flores RN; Care Manager: Basim Montes De Oca LMSW; : JASON Rutledge    HPI/Subjective:     Mr. Shane Brower is a 50 yo male with PMH of DM2, left foot wound followed by Dr. Job Sanchez of surgery, who has been readmitted with noncompliance, worsening foot wound and poor living conditions. He has been resumed on IV rocephin, EOT 19 and seen by surgery/ getting wound care. he is pending rehab.       19 ok for rehab tomorrow if bed available, eats ok, not weight bearing LLE          Objective:     Patient Vitals for the past 24 hrs:   Temp Pulse Resp BP SpO2   19 1126 98.4 °F (36.9 °C) 69 18 148/72 97 %   19 0800 97.8 °F (36.6 °C) 61 16 130/71 97 %   19 0403 97.6 °F (36.4 °C) 68 18 123/66 94 %   19 0004 98 °F (36.7 °C) 69 18 136/72 93 %   19 1917 98.8 °F (37.1 °C) 69 18 138/80 94 %   19 1603 97.3 °F (36.3 °C) 69 18 146/72 98 %     Oxygen Therapy  O2 Sat (%): 97 % (19 1126)  O2 Device: Room air (19 0825)    Estimated body mass index is 31.01 kg/m² as calculated from the following:    Height as of this encounter: 5' 9\" (1.753 m). Weight as of this encounter: 95.3 kg (210 lb). Intake/Output Summary (Last 24 hours) at 2019 1251  Last data filed at 2019 0502  Gross per 24 hour   Intake --   Output 975 ml   Net -975 ml       *Note that automatically entered I/Os may not be accurate; dependent on patient compliance with collection and accurate  by techs. General:    Well nourished. Alert. No distress     CV:   RRR. No murmur, rub, or gallop. No edema  Lungs:   CTAB. No wheezing, rhonchi, or rales. Anterior   Abdomen:   Soft, nontender, nondistended.  Decreased BS  Extremities: Warm and dry  Skin:     LLE bandaged  Neuro:  No gross focal deficits    Data Review:  I have reviewed all labs, meds, and studies from the last 24 hours:    Recent Results (from the past 24 hour(s))   GLUCOSE, POC    Collection Time: 09/18/19  5:07 PM   Result Value Ref Range    Glucose (POC) 91 65 - 100 mg/dL   GLUCOSE, POC    Collection Time: 09/18/19  9:21 PM   Result Value Ref Range    Glucose (POC) 135 (H) 65 - 100 mg/dL   GLUCOSE, POC    Collection Time: 09/19/19  7:14 AM   Result Value Ref Range    Glucose (POC) 187 (H) 65 - 100 mg/dL   GLUCOSE, POC    Collection Time: 09/19/19 11:18 AM   Result Value Ref Range    Glucose (POC) 160 (H) 65 - 100 mg/dL        All Micro Results     None          No results found for this visit on 09/09/19.     Current Meds:  Current Facility-Administered Medications   Medication Dose Route Frequency    cefTRIAXone (ROCEPHIN) 1 g in 0.9% sodium chloride (MBP/ADV) 50 mL  1 g IntraVENous Q24H    glyBURIDE/metFORMIN (GLUCOVANCE) 2.5/500 mg   Oral DAILY WITH BREAKFAST    sodium chloride (OCEAN) 0.65 % nasal squeeze bottle 2 Spray  2 Spray Both Nostrils PRN    lisinopril (PRINIVIL, ZESTRIL) tablet 40 mg  40 mg Oral DAILY    amLODIPine (NORVASC) tablet 5 mg  5 mg Oral DAILY    fluticasone propionate (FLONASE) 50 mcg/actuation nasal spray 2 Spray  2 Spray Both Nostrils DAILY    hydrALAZINE (APRESOLINE) 20 mg/mL injection 10 mg  10 mg IntraVENous Q6H PRN    dextrose 40% (GLUTOSE) oral gel 1 Tube  15 g Oral PRN    glucagon (GLUCAGEN) injection 1 mg  1 mg IntraMUSCular PRN    dextrose (D50W) injection syrg 12.5-25 g  25-50 mL IntraVENous PRN    insulin lispro (HUMALOG) injection   SubCUTAneous AC&HS    acetaminophen (TYLENOL) tablet 650 mg  650 mg Oral Q4H PRN    ondansetron (ZOFRAN) injection 4 mg  4 mg IntraVENous Q4H PRN    heparin (porcine) injection 5,000 Units  5,000 Units SubCUTAneous Q8H    bisacodyl (DULCOLAX) tablet 5 mg  5 mg Oral DAILY PRN    diphenhydrAMINE (BENADRYL) capsule 25 mg  25 mg Oral Q6H PRN       Other Studies (last 24 hours):  No results found.     Assessment and Plan:     Hospital Problems as of 9/19/2019 Date Reviewed: 9/9/2019          Codes Class Noted - Resolved POA    Open wound of right thigh ICD-10-CM: S71.101A  ICD-9-CM: 890.0  9/16/2019 - Present No        URI (upper respiratory infection) ICD-10-CM: J06.9  ICD-9-CM: 465.9  9/15/2019 - Present No        Self neglect ICD-10-CM: R46.89  ICD-9-CM: V40.39  9/11/2019 - Present Yes        Leukocytosis ICD-10-CM: D72.829  ICD-9-CM: 288.60  9/9/2019 - Present Yes        Medically noncompliant ICD-10-CM: Z91.19  ICD-9-CM: V15.81  9/9/2019 - Present Yes        Open wound of right foot ICD-10-CM: S91.301A  ICD-9-CM: 892.0  9/9/2019 - Present Yes        Diabetic foot infection ([facility] ICD-10-CM: E11.628, L08.9  ICD-9-CM: 250.80, 686.9  9/9/2019 - Present Yes        * (Principal) Diabetic ulcer of right foot associated with type 2 diabetes mellitus, with necrosis of muscle ([facility] ICD-10-CM: E11.621, L97.513  ICD-9-CM: 250.80, 707.15  8/24/2019 - Present Yes    Overview Addendum 9/16/2019  7:09 AM by Elio Gallardo MD     9/11/19 s/p Autologous STSG and dermal autografts from right thigh to right foot DFU/wound; Dr Mariana Villanueva             Uncontrolled type II diabetes mellitus ([facility] ICD-10-CM: E11.65  ICD-9-CM: 250.02  8/23/2019 - Present Yes              Plan:    · LLE DM2 foot wound: wound care per surgery, continued antibiotics EOT 9-19-19,  · DM2: refusing insulin per Vernal Haus of Dm2 education, continued glucovance/ SSI  · HTN: continued norvasc/ lisinopril    DC planning/Dispo:  Pending to rehab hopefully 9-20-19      Diet:  DIET DIABETIC CONSISTENT CARB  DIET NUTRITIONAL SUPPLEMENTS  DVT ppx:  heparin    Signed:  Kaci Lucia MD

## 2019-09-19 NOTE — PROGRESS NOTES
Shift assessment completed. Pt alert and oriented x4. Lungs CTA with even and unlabored respirations, on room air. Heart sounds regular. Active bowel sounds with soft and non-tender abdomen. Skin warm and dry. Right foot dressing c/d/i with no drainage noted. IV c/d and capped. Pt reports no pain or nausea at this time. Bed locked in lowest position with call light in reach.

## 2019-09-19 NOTE — PROGRESS NOTES
Problem: Diabetes Self-Management  Goal: *Disease process and treatment process  Description  Define diabetes and identify own type of diabetes; list 3 options for treating diabetes. Outcome: Progressing Towards Goal  Goal: *Incorporating nutritional management into lifestyle  Description  Describe effect of type, amount and timing of food on blood glucose; list 3 methods for planning meals. Outcome: Progressing Towards Goal  Goal: *Incorporating physical activity into lifestyle  Description  State effect of exercise on blood glucose levels. Outcome: Progressing Towards Goal  Goal: *Developing strategies to promote health/change behavior  Description  Define the ABC's of diabetes; identify appropriate screenings, schedule and personal plan for screenings. Outcome: Progressing Towards Goal  Goal: *Using medications safely  Description  State effect of diabetes medications on diabetes; name diabetes medication taking, action and side effects. Outcome: Progressing Towards Goal  Goal: *Monitoring blood glucose, interpreting and using results  Description  Identify recommended blood glucose targets  and personal targets. Outcome: Progressing Towards Goal  Goal: *Prevention, detection, treatment of acute complications  Description  List symptoms of hyper- and hypoglycemia; describe how to treat low blood sugar and actions for lowering  high blood glucose level. Outcome: Progressing Towards Goal  Goal: *Prevention, detection and treatment of chronic complications  Description  Define the natural course of diabetes and describe the relationship of blood glucose levels to long term complications of diabetes.   Outcome: Progressing Towards Goal  Goal: *Developing strategies to address psychosocial issues  Description  Describe feelings about living with diabetes; identify support needed and support network  Outcome: Progressing Towards Goal  Goal: *Insulin pump training  Outcome: Progressing Towards Goal  Goal: *Sick day guidelines  Outcome: Progressing Towards Goal  Goal: *Patient Specific Goal (EDIT GOAL, INSERT TEXT)  Outcome: Progressing Towards Goal     Problem: Falls - Risk of  Goal: *Absence of Falls  Description  Document Karon Duncan Fall Risk and appropriate interventions in the flowsheet.   Outcome: Progressing Towards Goal  Note:   Fall Risk Interventions:  Mobility Interventions: Bed/chair exit alarm, Patient to call before getting OOB         Medication Interventions: Bed/chair exit alarm, Patient to call before getting OOB, Teach patient to arise slowly    Elimination Interventions: Bed/chair exit alarm, Call light in reach, Patient to call for help with toileting needs, Stay With Me (per policy)

## 2019-09-19 NOTE — PROGRESS NOTES
H&P/Consult Note/Progress Note/Office Note:   Reid Patel  MRN: [id number]  :1965  Age:54 y.o.    HPI: Reid Patel is a 49 y.o. male who was admitted on 19 from the ER by the Hospitalists with a diabetic right foot infection. 19 s/p multiple right foot abscess drainages, removal of glass foreign body, debridement, and right 5th toe amputation. 19 s/p STSG and dermal autografts from right thigh to right foot wounds    He was re-admitted on 19 for self-neglect and failure of outpt management. ER recently noted much dirt, hair and cigarette butt in his wound. He is a diabetic with no meds, transportation, or resources to gets meds. His home is scheduled to be condemned in approx 21 days  [address] refuses to go to his home and they say it is unsafe environment    He is s/p STSG and dermal autografts from right thigh to right foot on 19. He is non-weight bearing on right foot. Prior to his initial admission he reported progressive, constant, severe right foot pain for at least 2 days. He was walking barefoot. He had associated low grade fever. He was placed on IV Abx by Hospitalists  He has h/o DM and non-compliance (No primary care doctor, not on DM meds)  He was not taking his insulin because \"no one showed me how\"  Bld Cxs from admission grew gm + cocci; Wound cultures from [place] grew pan-sensitive Strep and Staph Aureus   Xray as below    Surgery recommended inpt care or SNF but he had no insurance   helped him apply for medicaid  I am told [facility] is paying for his SNF transfer on 19 Xray right foot, 3 views  Triangular radiopaque foreign body within the plantar soft tissues at the level of the third proximal phalanx.   Subq emphysema seen extending from the first through fifth digits at the level of the proximal phalanges        19 Arterial duplex  The right and left lower extremity segmental blood pressures are fairly symmetric and unremarkable. KAROLINA:  Right = 1.28              Left = 1.28     RIGHT LOWER EXTREMITY:  Peak systolic velocities-- within normal limits. Normal triphasic waveform throughout.     LEFT LOWER EXTREMITY:  Peak systolic velocities-- within normal limits. Mildly  elevated distal popliteal artery velocity.  Normal triphasic waveforms throughout.     ABDOMEN:  No evidence of aneurysm.     IMPRESSION:  No significant arterial stenosis identified.             8/26/19 POD1 right foot debridement, abscess drainage, 5th toe amputation, and FB removal  8/27/19 POD2 No sign arterial stenosis on non-invasive vasc study   8/28/19 POD3 cultures from OR with alpha Strep  8/29/19 POD4 On IV Vanc; daily dressing changes; off-loading; case management working on medicaid and SNF placement  8/30/19 POD5; ON IV ceftriaxone per ID until 9/8/19 8/31/19 POD6; IV ceftriaxone planned until 9/8/19; no weight bearing right foot    9/9/19 Arrived in ER for re-admission  9/11/19 OR today for STSG  9/12/19 POD1 STSG and dermal autografts right thigh to right foot wound  9/16/19 POD5 dressing changes today; flap on thigh healing well; grafts on foot 100% take so far  9/19/19 POD8 dressing changed; grafts viable; clips out of foot and most clips out of right thigh, retention sutures remain in thigh          Past Medical History:   Diagnosis Date    Diabetes ([address]      Past Surgical History:   Procedure Laterality Date    HX CHOLECYSTECTOMY       Current Facility-Administered Medications   Medication Dose Route Frequency    cefTRIAXone (ROCEPHIN) 1 g in 0.9% sodium chloride (MBP/ADV) 50 mL  1 g IntraVENous Q24H    glyBURIDE/metFORMIN (GLUCOVANCE) 2.5/500 mg   Oral DAILY WITH BREAKFAST    sodium chloride (OCEAN) 0.65 % nasal squeeze bottle 2 Spray  2 Spray Both Nostrils PRN    lisinopril (PRINIVIL, ZESTRIL) tablet 40 mg  40 mg Oral DAILY    amLODIPine (NORVASC) tablet 5 mg  5 mg Oral DAILY    fluticasone propionate (FLONASE) 50 mcg/actuation nasal spray 2 Spray  2 Spray Both Nostrils DAILY    hydrALAZINE (APRESOLINE) 20 mg/mL injection 10 mg  10 mg IntraVENous Q6H PRN    dextrose 40% (GLUTOSE) oral gel 1 Tube  15 g Oral PRN    glucagon (GLUCAGEN) injection 1 mg  1 mg IntraMUSCular PRN    dextrose (D50W) injection syrg 12.5-25 g  25-50 mL IntraVENous PRN    insulin lispro (HUMALOG) injection   SubCUTAneous AC&HS    acetaminophen (TYLENOL) tablet 650 mg  650 mg Oral Q4H PRN    ondansetron (ZOFRAN) injection 4 mg  4 mg IntraVENous Q4H PRN    heparin (porcine) injection 5,000 Units  5,000 Units SubCUTAneous Q8H    bisacodyl (DULCOLAX) tablet 5 mg  5 mg Oral DAILY PRN    diphenhydrAMINE (BENADRYL) capsule 25 mg  25 mg Oral Q6H PRN     Patient has no known allergies. Social History     Socioeconomic History    Marital status: SINGLE     Spouse name: Not on file    Number of children: Not on file    Years of education: Not on file    Highest education level: Not on file   Tobacco Use    Smoking status: Current Every Day Smoker    Smokeless tobacco: Never Used   Substance and Sexual Activity    Alcohol use: No    Sexual activity: Never     Social History     Tobacco Use   Smoking Status Current Every Day Smoker   Smokeless Tobacco Never Used     History reviewed. No pertinent family history. ROS: The patient has no difficulty with chest pain or shortness of breath. No fever or chills. Comprehensive review of systems was otherwise unremarkable except as noted above.     Physical Exam:   Visit Vitals  /66 (BP 1 Location: Right arm, BP Patient Position: At rest;Head of bed elevated (Comment degrees))   Pulse 68   Temp 97.6 °F (36.4 °C)   Resp 18   Ht 5' 9\" (1.753 m)   Wt 210 lb (95.3 kg)   SpO2 94%   BMI 31.01 kg/m²     Vitals:    09/18/19 1603 09/18/19 1917 09/19/19 0004 09/19/19 0403   BP: 146/72 138/80 136/72 123/66   Pulse: 69 69 69 68   Resp: 18 18 18 18   Temp: 97.3 °F (36.3 °C) 98.8 °F (37.1 °C) 98 °F (36.7 °C) 97.6 °F (36.4 °C)   SpO2: 98% 94% 93% 94%   Weight:       Height:         09/18 1901 - 09/19 0700  In: -   Out: 975 [Urine:975]  09/17 0701 - 09/18 1900  In: -   Out: 2510 [Urine:2510]    Constitutional: Alert, oriented, cooperative patient in no acute distress; appears stated age    Eyes:Sclera are clear. ENMT: no external lesions gross hearing normal; no obvious neck masses, no ear or lip lesions, nares normal  CV: RRR. Normal perfusion  Resp: Breathing is  non-labored; no audible wheezing. GI: soft and non-distended     Musculoskeletal: unremarkable with normal function. No embolic signs or cyanosis. Right 5th toe amputation. Graft on right foot healing-->Skin clips removed on 9/19/19  Right thigh advancement flap closure healing well also without cellulitis or necrosis  No fluctuance;  No crepitus      Neuro:  Oriented; moves all 4; no focal deficits  Psychiatric: normal affect and mood, no memory impairment        Wounds prior to grafting                              Recent vitals (if inpt):  Patient Vitals for the past 24 hrs:   BP Temp Pulse Resp SpO2   09/19/19 0403 123/66 97.6 °F (36.4 °C) 68 18 94 %   09/19/19 0004 136/72 98 °F (36.7 °C) 69 18 93 %   09/18/19 1917 138/80 98.8 °F (37.1 °C) 69 18 94 %   09/18/19 1603 146/72 97.3 °F (36.3 °C) 69 18 98 %   09/18/19 0825 115/77 98 °F (36.7 °C) 69 18 97 %       Labs:  Recent Labs     09/18/19  0448   WBC 7.3   HGB 11.2*      *   K 4.1      CO2 29   BUN 22   CREA 0.95   *       Lab Results   Component Value Date/Time    WBC 7.3 09/18/2019 04:48 AM    HGB 11.2 (L) 09/18/2019 04:48 AM    PLATELET 269 38/74/0766 04:48 AM    Sodium 135 (L) 09/18/2019 04:48 AM    Potassium 4.1 09/18/2019 04:48 AM    Chloride 100 09/18/2019 04:48 AM    CO2 29 09/18/2019 04:48 AM    BUN 22 09/18/2019 04:48 AM    Creatinine 0.95 09/18/2019 04:48 AM    Glucose 132 (H) 09/18/2019 04:48 AM    Bilirubin, total 0.7 09/09/2019 08:24 PM    AST (SGOT) 25 09/09/2019 08:24 PM    ALT (SGPT) 31 09/09/2019 08:24 PM    Alk. phosphatase 112 09/09/2019 08:24 PM       CT Results  (Last 48 hours)    None        chest X-ray      I reviewed recent labs, recent radiologic studies, and pertinent records including other doctor notes if needed. I independently reviewed radiology images for studies I described above or studies I have ordered. Admission date (for inpatients): 9/9/2019   * No surgery found *  Procedure(s):  SPLIT THICKNESS SKIN GRAFT FROM RIGHT THIGH TO RIGHT FOOT    ASSESSMENT/PLAN:  Problem List  Date Reviewed: 9/9/2019          Codes Class Noted    URI (upper respiratory infection) ICD-10-CM: J06.9  ICD-9-CM: 465.9  9/15/2019        Self neglect ICD-10-CM: R46.89  ICD-9-CM: V40.39  9/11/2019        Medically noncompliant ICD-10-CM: Z91.19  ICD-9-CM: V15.81  9/9/2019        Leukocytosis ICD-10-CM: S54.823  ICD-9-CM: 288.60  9/9/2019        Diabetic foot infection ([facility]) ICD-10-CM: E11.628, L08.9  ICD-9-CM: 250.80, 686.9  9/9/2019        * (Principal) Diabetic ulcer of right foot associated with type 2 diabetes mellitus, with necrosis of muscle ([facility]) ICD-10-CM: E11.621, L97.513  ICD-9-CM: 250.80, 707.15  8/24/2019    Overview Addendum 9/16/2019  7:09 AM by Willie Anne MD     9/11/19 s/p Autologous STSG and dermal autografts from right thigh to right foot DFU/wound; Dr Luna Ragsdale             Uncontrolled type II diabetes mellitus ([facility]) ICD-10-CM: E11.65  ICD-9-CM: 250.02  8/23/2019        Open wound of right thigh ICD-10-CM: S71.101A  ICD-9-CM: 890.0  9/16/2019        Open wound of right foot ICD-10-CM: S91.301A  ICD-9-CM: 892.0  9/9/2019        Sepsis ([facility]) ICD-10-CM: A41.9  ICD-9-CM: 038.9, 995.91  8/24/2019        Cellulitis of right leg ICD-10-CM: L03.115  ICD-9-CM: 682.6  8/23/2019        Foreign body (FB) in soft tissue ICD-10-CM: M79.5  ICD-9-CM: 729.6  8/23/2019        Subcutaneous emphysema ([facility]) ICD-10-CM: T79. 7XXA  ICD-9-CM: 958.7  8/23/2019        Personal history of noncompliance with medical treatment ICD-10-CM: Z91.19  ICD-9-CM: V15.81  8/23/2019        Hyponatremia ICD-10-CM: E87.1  ICD-9-CM: 276.1  8/23/2019            Principal Problem:    Diabetic ulcer of right foot associated with type 2 diabetes mellitus, with necrosis of muscle ([facility] (8/24/2019)      Overview: 9/11/19 s/p Autologous STSG and dermal autografts from right thigh to       right foot DFU/wound; Dr Karlie Paris    Active Problems:    URI (upper respiratory infection) (9/15/2019)      Medically noncompliant (9/9/2019)      Self neglect (9/11/2019)      Uncontrolled type II diabetes mellitus ([facility] (8/23/2019)      Leukocytosis (9/9/2019)      Diabetic foot infection ([facility] (9/9/2019)      Open wound of right foot (9/9/2019)      Open wound of right thigh (9/16/2019)         Diabetic right foot infection - limb threatening  He is s/p STSG and dermal autograft from right thigh to right foot on 9/11/19    He is also s/p right 5th toe amputation, abscess drainage, distal plantar forefoot debridement with extraction of glass (FB) on 8/25/19  He failed outpt management and has demonstrated he won't be compliant with outpt Rx at home (diabetic, dressings, off-loading, etc...)  He lives alone without support in a home that the health department is threatening to condmenn in approx 25 days      Minimal weight bearing right foot while in Demetrius off-loading shoe to off-load the plantar right forefoot   where the graft is located at the base of the remaining toes.   I changed the right thigh and the right foot dressing on 5/19/19  They are to be left alone until I change them again in the office in 1 wk on Thursday Sept 26  I wrote all discharge instructions including follow-up in his discharge instructions section of connect care    OR cultures --->gm + cocci with alpha Strep  He may go to SNF on Friday 9/20/19 off abx        Recommend SNF placement for 1-2 months    PVD  No significant arterial stenosis noted on non-invasive arterial duplex on 8/26/19    Uncontrolled DM  Medically Supervised (Inpt an at rehab) diabetic control and compliance  Sliding scale and semiquants    Non-Compliance with outpt DM regimen  Encourage compliance  Risks of non-compliance discussed including amputation and early death  He needs SNF            I have personally performed a face-to-face diagnostic evaluation and management  service on this patient. I have independently seen the patient. I have independently obtained the above history from the patient/family. I have independently examined the patient with above findings. I have independently reviewed data/labs for this patient and developed the above plan of care (MDM). Signed: Colin Andrew.  Lorene Mae MD, FACS

## 2019-09-19 NOTE — PROGRESS NOTES
Interdisciplinary team rounds were held 9/19/2019 with the following team members:Care Management, Nursing, Nutrition, Pharmacy, Physical Therapy and Physician. Plan of care discussed. See clinical pathway and/or care plan for interventions and desired outcomes.

## 2019-09-19 NOTE — PROGRESS NOTES
Slim cont to follow and assist. Sw rec call this pm from pt's sister Evangelina Gonzalez [phone]. She had planned to come to [place] today to bring pt some belongings (clothes, toiletries). She can come tomorrow instead in order to transport pt to NH. Hospital still working on a contract with NH. Finally received information about which NH has a male bed avail. [address] will be the NH pt will rec his care. Supervisor of  reviewing contract and making changes. Sw to cont to coord this extremely difficult discharge plan. Sw to call Zuri Breen back with a final answer as to whether pt will get to go to [facility] tomorrow or not. Diana Rizo/SW    Received call that contract has been signed and terms agreed upon. Pt can transfer to [facility] tomorrow. Slim talked with pt's sister time MULTIPLE today. She is coming in the morning to transport him to NH. She has cont to try to help him understand the severity of his condition and the roll/responsibility that he plays in getting better.  Zuri Breen to arrive around 10-11 in am.   Aneesh Ashby

## 2019-09-20 VITALS
HEIGHT: 69 IN | RESPIRATION RATE: 16 BRPM | HEART RATE: 64 BPM | OXYGEN SATURATION: 97 % | TEMPERATURE: 96.4 F | SYSTOLIC BLOOD PRESSURE: 127 MMHG | BODY MASS INDEX: 31.1 KG/M2 | WEIGHT: 210 LBS | DIASTOLIC BLOOD PRESSURE: 61 MMHG

## 2019-09-20 LAB
ANION GAP SERPL CALC-SCNC: 4 MMOL/L (ref 7–16)
BUN SERPL-MCNC: 23 MG/DL (ref 6–23)
CALCIUM SERPL-MCNC: 9.1 MG/DL (ref 8.3–10.4)
CHLORIDE SERPL-SCNC: 103 MMOL/L (ref 98–107)
CO2 SERPL-SCNC: 26 MMOL/L (ref 21–32)
CREAT SERPL-MCNC: 0.84 MG/DL (ref 0.8–1.5)
ERYTHROCYTE [DISTWIDTH] IN BLOOD BY AUTOMATED COUNT: 13.4 % (ref 11.9–14.6)
GLUCOSE BLD STRIP.AUTO-MCNC: 145 MG/DL (ref 65–100)
GLUCOSE BLD STRIP.AUTO-MCNC: 168 MG/DL (ref 65–100)
GLUCOSE SERPL-MCNC: 141 MG/DL (ref 65–100)
HCT VFR BLD AUTO: 34 % (ref 41.1–50.3)
HGB BLD-MCNC: 11 G/DL (ref 13.6–17.2)
MCH RBC QN AUTO: 28 PG (ref 26.1–32.9)
MCHC RBC AUTO-ENTMCNC: 32.4 G/DL (ref 31.4–35)
MCV RBC AUTO: 86.5 FL (ref 79.6–97.8)
NRBC # BLD: 0 K/UL (ref 0–0.2)
PLATELET # BLD AUTO: 172 K/UL (ref 150–450)
PMV BLD AUTO: 10.6 FL (ref 9.4–12.3)
POTASSIUM SERPL-SCNC: 4 MMOL/L (ref 3.5–5.1)
RBC # BLD AUTO: 3.93 M/UL (ref 4.23–5.6)
SODIUM SERPL-SCNC: 133 MMOL/L (ref 136–145)
WBC # BLD AUTO: 7 K/UL (ref 4.3–11.1)

## 2019-09-20 PROCEDURE — 74011250636 HC RX REV CODE- 250/636: Performed by: SURGERY

## 2019-09-20 PROCEDURE — 85027 COMPLETE CBC AUTOMATED: CPT

## 2019-09-20 PROCEDURE — 74011250637 HC RX REV CODE- 250/637: Performed by: INTERNAL MEDICINE

## 2019-09-20 PROCEDURE — 80048 BASIC METABOLIC PNL TOTAL CA: CPT

## 2019-09-20 PROCEDURE — 74011000258 HC RX REV CODE- 258: Performed by: HOSPITALIST

## 2019-09-20 PROCEDURE — 74011250636 HC RX REV CODE- 250/636: Performed by: HOSPITALIST

## 2019-09-20 PROCEDURE — 74011636637 HC RX REV CODE- 636/637: Performed by: SURGERY

## 2019-09-20 PROCEDURE — 82962 GLUCOSE BLOOD TEST: CPT

## 2019-09-20 PROCEDURE — 36415 COLL VENOUS BLD VENIPUNCTURE: CPT

## 2019-09-20 RX ORDER — FLUTICASONE PROPIONATE 50 MCG
2 SPRAY, SUSPENSION (ML) NASAL DAILY
Qty: 1 BOTTLE | Refills: 0 | Status: SHIPPED
Start: 2019-09-20

## 2019-09-20 RX ORDER — GLYBURIDE-METFORMIN HYDROCHLORIDE 2.5; 5 MG/1; MG/1
1 TABLET ORAL
Qty: 30 TAB | Refills: 0 | Status: SHIPPED
Start: 2019-09-20

## 2019-09-20 RX ORDER — INSULIN LISPRO 100 [IU]/ML
INJECTION, SOLUTION INTRAVENOUS; SUBCUTANEOUS
Qty: 1 VIAL | Refills: 0 | Status: SHIPPED
Start: 2019-09-20

## 2019-09-20 RX ADMIN — HEPARIN SODIUM 5000 UNITS: 5000 INJECTION INTRAVENOUS; SUBCUTANEOUS at 06:17

## 2019-09-20 RX ADMIN — FLUTICASONE PROPIONATE 2 SPRAY: 50 SPRAY, METERED NASAL at 09:04

## 2019-09-20 RX ADMIN — LISINOPRIL 40 MG: 20 TABLET ORAL at 09:04

## 2019-09-20 RX ADMIN — METFORMIN HYDROCHLORIDE: 500 TABLET, FILM COATED ORAL at 09:04

## 2019-09-20 RX ADMIN — AMLODIPINE BESYLATE 5 MG: 5 TABLET ORAL at 09:04

## 2019-09-20 RX ADMIN — CEFTRIAXONE 1 G: 1 INJECTION, POWDER, FOR SOLUTION INTRAMUSCULAR; INTRAVENOUS at 00:28

## 2019-09-20 RX ADMIN — INSULIN LISPRO 2 UNITS: 100 INJECTION, SOLUTION INTRAVENOUS; SUBCUTANEOUS at 12:26

## 2019-09-20 NOTE — PROGRESS NOTES
Shift assessment completed at this time. See details in flow sheet. Pt is A/O x 4. Denies pain and acute distress at this time. Dressing to rt foot noted with old blood and reinforced with gauze. All safety measures in place. Call light within reach and pt is aware to call for assistance.

## 2019-09-20 NOTE — PROGRESS NOTES
Spoke to Salvatore Arroyo, pt's sister who stated she was in [place] and could not  patient for transfer today. Notified Kana Lord.

## 2019-09-20 NOTE — PROGRESS NOTES
Care Management Interventions  Transition of Care Consult (CM Consult): SNF  Partner SNF: No  Reason Why Partner SNF Not Chosen: Bed availability  Current Support Network: Own Home  Confirm Follow Up Transport: Family  Plan discussed with Pt/Family/Caregiver: Yes  Discharge Location  Discharge Placement: Skilled nursing facility    SW spoke with Omid Brand from [employer] with [employer]. Pt will transfer to room 503 at [facility]. Pt's sister will provide transport. Info faxed to Omid Brand.    Alexander Cortez

## 2019-09-20 NOTE — PROGRESS NOTES
Shift assessment completed. Pt alert and oriented x4. Lungs CTA with even and unlabored respirations, on room air. Heart sounds regular. Active bowel sounds with soft and non-tender abdomen. Skin warm and dry. Right thigh incision and foot dressing intact. IV c/d and capped. No other needs at this time. Bed locked in lowest position with call light in reach.

## 2019-09-20 NOTE — DISCHARGE SUMMARY
Hospitalist Discharge Summary     Admit Date:  2019  7:48 PM   Name:  Izzy Gant   Age:  49 y.o.  :  1965   MRN:  [id number]   PCP:  Carmelita Hilton MD  Treatment Team: Attending Provider: Shawnee Lake DO; Utilization Review: Tobi Harris RN; Care Manager: Grey Miller [facility]; : Ayleen Doyle [place]    Problem List for this Hospitalization:  Hospital Problems as of 2019 Date Reviewed: 2019          Codes Class Noted - Resolved POA    Open wound of right thigh ICD-10-CM: S71.101A  ICD-9-CM: 890.0  2019 - Present No        URI (upper respiratory infection) ICD-10-CM: J06.9  ICD-9-CM: 465.9  9/15/2019 - Present No        Self neglect ICD-10-CM: R46.89  ICD-9-CM: V40.39  2019 - Present Yes        Leukocytosis ICD-10-CM: D72.829  ICD-9-CM: 288.60  2019 - Present Yes        Medically noncompliant ICD-10-CM: Z91.19  ICD-9-CM: V15.81  2019 - Present Yes        Open wound of right foot ICD-10-CM: S91.301A  ICD-9-CM: 892.0  2019 - Present Yes        Diabetic foot infection ([address] ICD-10-CM: E11.628, L08.9  ICD-9-CM: 250.80, 686.9  2019 - Present Yes        * (Principal) Diabetic ulcer of right foot associated with type 2 diabetes mellitus, with necrosis of muscle ([address] ICD-10-CM: E11.621, L97.513  ICD-9-CM: 250.80, 707.15  2019 - Present Yes    Overview Addendum 2019  7:09 AM by Valerie Eng MD     19 s/p Autologous STSG and dermal autografts from right thigh to right foot DFU/wound; Dr Elysia Mcguire             Uncontrolled type II diabetes mellitus [facility]) ICD-10-CM: E11.65  ICD-9-CM: 250.02  2019 - Present Yes                  Hospital Course:    Mr. Marj Stevenson is a 48 yo male with PMH of DM2, left foot wound followed by Dr. Elysia Mcguire of surgery, who has been readmitted with noncompliance, worsening foot wound and poor living conditions. He has been resumed on IV rocephin, EOT 19 and seen by surgery/ getting wound care.  He is s/p dermal autograft from right thigh to right forefoot 9-11-19. Instructions per surgery are for :  -minimal weight bearing to right foot while in Demetrius off-loading shoe to off load plantar right forefoot  -leave wounds alone until he is seen by surgery on 9-26-19 office followup. Disposition: Skilled Nursing Facility  Activity: See surgical instructions  Diet: DIET DIABETIC CONSISTENT CARB Regular  DIET NUTRITIONAL SUPPLEMENTS Dinner; Ensure High Protein  Code Status: Full Code      Follow up instructions, discharge meds at bottom of this note. Plan was discussed with patient. All questions answered. Patient was stable at time of discharge. Patient will call a physician or return if any concerns. Diagnostic Imaging/Tests:   Xr Foot Rt Min 3 V    Result Date: 9/9/2019  RIGHT FOOT SERIES HISTORY: Pain COMPARISON: August 23, 2019 FINDINGS: The fifth toe has been amputated. There is no displaced fracture, malalignment or osseous erosions. IMPRESSION: Fifth toe amputation. Echocardiogram results:  No results found for this visit on 09/09/19. Procedures done this admission:  Procedure(s):  SPLIT THICKNESS SKIN GRAFT FROM RIGHT THIGH TO RIGHT FOOT    All Micro Results     None          Labs: Results:       BMP, Mg, Phos Recent Labs     09/20/19  0526 09/18/19  0448   * 135*   K 4.0 4.1    100   CO2 26 29   AGAP 4* 6*   BUN 23 22   CREA 0.84 0.95   CA 9.1 9.3   * 132*      CBC Recent Labs     09/20/19  0526 09/18/19  0448   WBC 7.0 7.3   RBC 3.93* 4.00*   HGB 11.0* 11.2*   HCT 34.0* 34.8*    192      LFT No results for input(s): SGOT, ALT, TBIL, AP, TP, ALB, GLOB, AGRAT, GPT in the last 72 hours.    Cardiac Testing No results found for: BNPP, BNP, CPK, RCK1, RCK2, RCK3, RCK4, CKMB, CKNDX, CKND1, TROPT, TROIQ   Coagulation Tests No results found for: PTP, INR, APTT   A1c Lab Results   Component Value Date/Time    Hemoglobin A1c 8.2 09/09/2019 07:00 PM    Hemoglobin A1c 9.7 08/23/2019 05:54 PM      Lipid Panel No results found for: CHOL, CHOLPOCT, CHOLX, CHLST, CHOLV, 460330, HDL, HDLP, LDL, LDLC, DLDLP, 453963, VLDLC, VLDL, TGLX, TRIGL, TRIGP, TGLPOCT, CHHD, CHHDX   Thyroid Panel No results found for: TSH, T4, FT4, TT3, T3U, TSHEXT     Most Recent UA Lab Results   Component Value Date/Time    Color YELLOW 09/10/2019 07:56 PM    Appearance CLEAR 09/10/2019 07:56 PM    Specific gravity 1.014 09/10/2019 07:56 PM    pH (UA) 5.5 09/10/2019 07:56 PM    Protein NEGATIVE  09/10/2019 07:56 PM    Glucose NEGATIVE  09/10/2019 07:56 PM    Ketone NEGATIVE  09/10/2019 07:56 PM    Bilirubin NEGATIVE  09/10/2019 07:56 PM    Blood NEGATIVE  09/10/2019 07:56 PM    Urobilinogen 0.2 09/10/2019 07:56 PM    Nitrites NEGATIVE  09/10/2019 07:56 PM    Leukocyte Esterase NEGATIVE  09/10/2019 07:56 PM    WBC 20-50 10/04/2008 02:52 PM    RBC 10-20 10/04/2008 02:52 PM    Epithelial cells 0-3 10/04/2008 02:52 PM    Bacteria 1+ (H) 10/04/2008 02:52 PM    Casts 0 10/04/2008 02:52 PM    Crystals, urine 0 10/04/2008 02:52 PM    Mucus 0 10/04/2008 02:52 PM    Other observations  10/04/2008 02:52 PM     MICROSCOPIC URINALYSIS PERFORMED ON UNSPUN URINE DUE TO QNS        No Known Allergies  Immunization History   Administered Date(s) Administered    Influenza Vaccine (Quad) PF 09/04/2019    TB Skin Test (PPD) Intradermal 08/31/2019, 09/09/2019    Td, Adsorbed PF 08/26/2019       All Labs from Last 24 Hrs:  Recent Results (from the past 24 hour(s))   GLUCOSE, POC    Collection Time: 09/19/19 11:18 AM   Result Value Ref Range    Glucose (POC) 160 (H) 65 - 100 mg/dL   GLUCOSE, POC    Collection Time: 09/19/19  4:59 PM   Result Value Ref Range    Glucose (POC) 118 (H) 65 - 100 mg/dL   GLUCOSE, POC    Collection Time: 09/19/19 10:42 PM   Result Value Ref Range    Glucose (POC) 151 (H) 65 - 100 mg/dL   CBC W/O DIFF    Collection Time: 09/20/19  5:26 AM   Result Value Ref Range    WBC 7.0 4.3 - 11.1 K/uL    RBC 3.93 (L) 4.23 - 5.6 M/uL    HGB 11.0 (L) 13.6 - 17.2 g/dL    HCT 34.0 (L) 41.1 - 50.3 %    MCV 86.5 79.6 - 97.8 FL    MCH 28.0 26.1 - 32.9 PG    MCHC 32.4 31.4 - 35.0 g/dL    RDW 13.4 11.9 - 14.6 %    PLATELET 731 190 - 808 K/uL    MPV 10.6 9.4 - 12.3 FL    ABSOLUTE NRBC 0.00 0.0 - 0.2 K/uL   METABOLIC PANEL, BASIC    Collection Time: 09/20/19  5:26 AM   Result Value Ref Range    Sodium 133 (L) 136 - 145 mmol/L    Potassium 4.0 3.5 - 5.1 mmol/L    Chloride 103 98 - 107 mmol/L    CO2 26 21 - 32 mmol/L    Anion gap 4 (L) 7 - 16 mmol/L    Glucose 141 (H) 65 - 100 mg/dL    BUN 23 6 - 23 MG/DL    Creatinine 0.84 0.8 - 1.5 MG/DL    GFR est AA >60 >60 ml/min/1.73m2    GFR est non-AA >60 >60 ml/min/1.73m2    Calcium 9.1 8.3 - 10.4 MG/DL   GLUCOSE, POC    Collection Time: 09/20/19  7:14 AM   Result Value Ref Range    Glucose (POC) 145 (H) 65 - 100 mg/dL       Current Med List in Hospital:   Current Facility-Administered Medications   Medication Dose Route Frequency    cefTRIAXone (ROCEPHIN) 1 g in 0.9% sodium chloride (MBP/ADV) 50 mL  1 g IntraVENous Q24H    glyBURIDE/metFORMIN (GLUCOVANCE) 2.5/500 mg   Oral DAILY WITH BREAKFAST    sodium chloride (OCEAN) 0.65 % nasal squeeze bottle 2 Spray  2 Spray Both Nostrils PRN    lisinopril (PRINIVIL, ZESTRIL) tablet 40 mg  40 mg Oral DAILY    amLODIPine (NORVASC) tablet 5 mg  5 mg Oral DAILY    fluticasone propionate (FLONASE) 50 mcg/actuation nasal spray 2 Spray  2 Spray Both Nostrils DAILY    hydrALAZINE (APRESOLINE) 20 mg/mL injection 10 mg  10 mg IntraVENous Q6H PRN    dextrose 40% (GLUTOSE) oral gel 1 Tube  15 g Oral PRN    glucagon (GLUCAGEN) injection 1 mg  1 mg IntraMUSCular PRN    dextrose (D50W) injection syrg 12.5-25 g  25-50 mL IntraVENous PRN    insulin lispro (HUMALOG) injection   SubCUTAneous AC&HS    acetaminophen (TYLENOL) tablet 650 mg  650 mg Oral Q4H PRN    ondansetron (ZOFRAN) injection 4 mg  4 mg IntraVENous Q4H PRN    heparin (porcine) injection 5,000 Units 5,000 Units SubCUTAneous Q8H    bisacodyl (DULCOLAX) tablet 5 mg  5 mg Oral DAILY PRN    diphenhydrAMINE (BENADRYL) capsule 25 mg  25 mg Oral Q6H PRN       Discharge Exam:  Patient Vitals for the past 24 hrs:   Temp Pulse Resp BP SpO2   09/20/19 0720 96.4 °F (35.8 °C) 61 18 138/76 98 %   09/20/19 0325 97.7 °F (36.5 °C) 63 16 119/66 92 %   09/20/19 0032 97.9 °F (36.6 °C) 71 16 144/68 93 %   09/19/19 1948 97.5 °F (36.4 °C) 65 16 152/83 97 %   09/19/19 1620 98.6 °F (37 °C) 69 18 148/68 96 %   09/19/19 1126 98.4 °F (36.9 °C) 69 18 148/72 97 %     Oxygen Therapy  O2 Sat (%): 98 % (09/20/19 0720)  O2 Device: Room air (09/19/19 0825)    Estimated body mass index is 31.01 kg/m² as calculated from the following:    Height as of this encounter: 5' 9\" (1.753 m). Weight as of this encounter: 95.3 kg (210 lb). Intake/Output Summary (Last 24 hours) at 9/20/2019 0855  Last data filed at 9/19/2019 1710  Gross per 24 hour   Intake --   Output 200 ml   Net -200 ml       *Note that automatically entered I/Os may not be accurate; dependent on patient compliance with collection and accurate  by assistants. General:    Well nourished. Alert. No distress   CV:   Regular rate and rhythm. No murmur, rub, or gallop. No edema   Lungs:  Clear to auscultation bilaterally. No wheezing, rhonchi, or rales. Anterior   Abdomen: Soft, nontender, nondistended. Bowel sounds normal.   Skin:     Right foot wrapped  Psych:  Normal mood and affect. Discharge Info:   Current Discharge Medication List      START taking these medications    Details   insulin lispro (HUMALOG) 100 unit/mL injection per sliding scale prior to meals and bedtime  Qty: 1 Vial, Refills: 0      glyBURIDE-metFORMIN (GLUCOVANCE) 2.5-500 mg per tablet Take 1 Tab by mouth Daily (before breakfast). Qty: 30 Tab, Refills: 0      fluticasone propionate (FLONASE) 50 mcg/actuation nasal spray 2 Sprays by Both Nostrils route daily.   Qty: 1 Bottle, Refills: 0 CONTINUE these medications which have NOT CHANGED    Details   amLODIPine (NORVASC) 5 mg tablet Take 1 Tab by mouth daily. Qty: 30 Tab, Refills: 1      lisinopril (PRINIVIL, ZESTRIL) 40 mg tablet Take 1 Tab by mouth daily. Qty: 30 Tab, Refills: 1         STOP taking these medications       cephALEXin (KEFLEX) 500 mg capsule Comments:   Reason for Stopping:         insulin glargine (LANTUS) 100 unit/mL injection Comments:   Reason for Stopping:         cefTRIAXone 2 gram 2 g, ADDaptor 1 Device IVPB Comments:   Reason for Stopping: Follow Up Orders: Follow-up Appointments   Procedures    FOLLOW UP VISIT Appointment in: Other (Specify) As scheduled with Dr. Dominic Loaiza     As scheduled with Dr. Dominic Loaiza     Standing Status:   Standing     Number of Occurrences:   1     Order Specific Question:   Appointment in     Answer: Other (Specify)       Follow-up Information     Follow up With Specialties Details Why Contact Info    Ganga Champion MD    Patient can only remember the practice name and not the physician            Time spent in patient discharge planning and coordination 45 minutes.     Signed:  Sue Metz MD

## 2019-09-20 NOTE — PROGRESS NOTES
TRANSFER - OUT REPORT:    Verbal report given to Mayela Burns RN (name) on Kelly Hamilton  being transferred to [facility] (unit) for routine progression of care       Report consisted of patients Situation, Background, Assessment and   Recommendations(SBAR). Information from the following report(s) SBAR, Kardex, Intake/Output and MAR was reviewed with the receiving nurse. Lines:       Opportunity for questions and clarification was provided.       Patient transported with:   Glorious Horse

## 2019-10-17 ENCOUNTER — PATIENT OUTREACH (OUTPATIENT)
Dept: CASE MANAGEMENT | Age: 54
End: 2019-10-17

## 2019-10-17 NOTE — PROGRESS NOTES
Ambulatory Care Coordination - Social Work Assessment   Referral from which RN CM: [facility]   Previously referred? If so, reason and brief outcome No   Reason for current referral: DC planning post STR   Income information (if needed): Low monthly income-specifics not reported   Sources of Support: Minimal support from sister and friends   ACP set up? Needs information? No   Medication Cost assistance needed? Not at this time   Referral to CLTC/Medicaid needed? Medicaid applied for   Referral to Medicare Extra Help/LIS program needed? No   Small home repair needed? No   MOW referral? Already in place   Any other concerns/questions? Unclean living environment (excessive cats in the home)  Wound that is healing very slowly   Next steps: Follow while pt is in STR to assist with safe DC planning       CUAUHTEMOC SANABRIA and pt's Inpatient RN CM [facility]) met with staff at [facility] on 10.15.19 to discuss Deanna's recent request for a 30 day extension. Both CM's met with , ADON and Social Workers. Both CM's also met with pt and were able to look at pt's wound that was unwrapped by wound nurse. Pt was pleasant and agreeable. At this time, DC plan remains uncertain, as pt's home has not been cleared by DSS as livable. Furthermore, wound is making very slow progress, per [employer] staff. Pt lacking support sytem and cannot drive due to wound. 30 day extension granted. Today, CUAUHTEMOC SANABRIA reached out to  Linden Yen at [facility], to inquire about Medicaid status. Per Vladislav Don, [facility] application submitted through the [facility] is Good Program which expedites the process. Pt has a phone interview on 10.20.19 which will determine if pt qualifies for disability, per Vladislav Don. She stated that If pt qualifies for disability, Medicaid process will be \"much quicker. \" Once Medicaid has obtained all pertinent information related to pt, it usually takes  days to process. CUAUHTEMOC SANABRIA will follow up with Irene Escoto next week for status inquiry. This note will not be viewable in [address]

## 2019-10-18 NOTE — PROGRESS NOTES
Later entry 10/15/19 @0900. I Visited pt @ [facility] to evaluate the need for continuation of care, for the facility is requesting for \"another 30 days\", to treat the pt. I reviewed the pt's paper chart and all pertinent EMR. It was clearly documented that pt was receiving daily dressing changes, the would nurse changes the dressings weekly. This documentation describes the wound in detail and with measurements. Pt has also been eating approx 50% of meals, has finished IV ABX that he was d/c form the hospital on, is  On his last day of the [place] that the pt was being treated for C-Diff. I visited the pt with the wound nurse, he was very awake, alert and personable. Wound observed to have minimal to no odor, new granulation tissue noted in the bed and around the edges of the wound, no tendons can be visualized( as were noted form the hospital admission photos, which I reviewed prior to visiting pt). There was tissue in the base of the would on the ball of foot below his toes that was yellow in color and soughing off. No pus,drainage, escar,redened or warm to touch parker noted. Pt states that he \"felt well and had little pain\" in his foot. Reported back to my direct supervisor Allena Sandhoff CM about the findings. I observed after reviewing pt's old hospital photos, current documentation, care and seeing the condition of the pt's foot now, that improvement has been made and it would benefit the pt for continued care @ the facility.

## 2019-10-24 ENCOUNTER — PATIENT OUTREACH (OUTPATIENT)
Dept: CASE MANAGEMENT | Age: 54
End: 2019-10-24

## 2019-10-24 NOTE — PROGRESS NOTES
CUAUHTEMOC Sanbaria met with pt at [facility] to complete further paperwork for Medicaid In Home care. Pt stated that the phone interview with Medicaid went well. CUAUHTEMOC SANABRIA sent paperwork to [facility] for [facility] representative to review and submit. Still waiting on license and SS card copies. Once obtained, those will be submitted as well. Pt reported doing well. Stated that wound RN has informed him that his foot is healing well. CUAUHTEMOC SANABRIA spoke with  Ana Maria Kaur briefly about pt status. Nothing negative to report at this time. CUAUHTEMOC SANABRIA will continue to follow. This note will not be viewable in [address]

## 2019-10-30 ENCOUNTER — PATIENT OUTREACH (OUTPATIENT)
Dept: CASE MANAGEMENT | Age: 54
End: 2019-10-30

## 2019-10-30 NOTE — PROGRESS NOTES
CUAUHTEMOC SANABRIA attempted to reach pt's sister Vlad Mckeon regarding pt's bank statements. Three months of statements are needed for Medicaid application. VM left last week with no returned call. CUAUHTEMOC SANABRIA attempted again today-VM box is full. CUAUHTEMOC SANABRIA will attempt again later this week. This note will not be viewable in [address]

## 2019-10-30 NOTE — PROGRESS NOTES
CUAUHTEMOC SANABRIA received call from Sam Holland regarding getting 3 months of bank statements for pt, as that is needed for Medicaid. Kathi Liao was very willing to assist and is reaching out to pt to discuss ways in which pt can get those statements to CUAUHTEMOC SANABRIA. CUAUHTEMOC SANABRIA will follow and expressed the need for haste. This note will not be viewable in [address]

## 2019-11-01 ENCOUNTER — PATIENT OUTREACH (OUTPATIENT)
Dept: CASE MANAGEMENT | Age: 54
End: 2019-11-01

## 2019-11-14 ENCOUNTER — PATIENT OUTREACH (OUTPATIENT)
Dept: CASE MANAGEMENT | Age: 54
End: 2019-11-14

## 2019-11-14 NOTE — PROGRESS NOTES
CUAUHTEMOC SANABRIA received a call from pt's sister Leanna Stein. She left copies of pt's bank statements for Medicaid. CUAUHTEMOC SANABRIA obtained those statements and sent them to [address] per request.  Pt has still not been accepted for Medicaid. [employer] contract will end on 11.17.19. At that time, pt will be leaving. Mor Maki inquired about respite until she can \"figure things out. \"  While pt's wound has improved significantly;y, Dr. William Villalpando has not cleared him to drive and still does not want pt to place weight on the right foot. (per Mor Maki). CUAUHTEMOC SANABRIA provided numbers for  liaisons with [address] [place] [address] and [employer]. They will be able to provide more details. CUAUHTEMOC Perdomo (at [facility]) mentioned a boarding home but per Mor Maki, pt was not agreeable to that. Mor Maki is also considering a hotel room temporarily. Mor Maki aware that placement will have to be paid for privately, as pt has no insurance at this time. .  CUAUHTEMOC SANABRIA will remain available if pt or family have further questions. This note will not be viewable in [address]

## 2019-11-22 ENCOUNTER — PATIENT OUTREACH (OUTPATIENT)
Dept: CASE MANAGEMENT | Age: 54
End: 2019-11-22

## 2019-12-16 PROBLEM — Z91.199 NO-SHOW FOR APPOINTMENT: Status: ACTIVE | Noted: 2019-12-16
